# Patient Record
Sex: FEMALE | Race: WHITE | NOT HISPANIC OR LATINO | URBAN - METROPOLITAN AREA
[De-identification: names, ages, dates, MRNs, and addresses within clinical notes are randomized per-mention and may not be internally consistent; named-entity substitution may affect disease eponyms.]

---

## 2024-07-05 ENCOUNTER — INPATIENT (INPATIENT)
Facility: HOSPITAL | Age: 89
LOS: 5 days | Discharge: NURSING, REHAB OR LONG-TERM CARE | DRG: 871 | End: 2024-07-11
Attending: STUDENT IN AN ORGANIZED HEALTH CARE EDUCATION/TRAINING PROGRAM | Admitting: HOSPITALIST
Payer: MEDICARE

## 2024-07-05 VITALS
SYSTOLIC BLOOD PRESSURE: 149 MMHG | RESPIRATION RATE: 19 BRPM | HEART RATE: 110 BPM | OXYGEN SATURATION: 99 % | HEIGHT: 62 IN | TEMPERATURE: 98 F | DIASTOLIC BLOOD PRESSURE: 78 MMHG | WEIGHT: 130.07 LBS

## 2024-07-05 DIAGNOSIS — A41.9 SEPSIS, UNSPECIFIED ORGANISM: ICD-10-CM

## 2024-07-05 LAB
ALBUMIN SERPL ELPH-MCNC: 4 G/DL — SIGNIFICANT CHANGE UP (ref 3.5–5.2)
ALP SERPL-CCNC: 90 U/L — SIGNIFICANT CHANGE UP (ref 30–115)
ALT FLD-CCNC: 22 U/L — SIGNIFICANT CHANGE UP (ref 0–41)
ANION GAP SERPL CALC-SCNC: 23 MMOL/L — HIGH (ref 7–14)
APPEARANCE UR: ABNORMAL
AST SERPL-CCNC: 85 U/L — HIGH (ref 0–41)
BASOPHILS # BLD AUTO: 0.03 K/UL — SIGNIFICANT CHANGE UP (ref 0–0.2)
BASOPHILS NFR BLD AUTO: 0.2 % — SIGNIFICANT CHANGE UP (ref 0–1)
BILIRUB SERPL-MCNC: 0.6 MG/DL — SIGNIFICANT CHANGE UP (ref 0.2–1.2)
BILIRUB UR-MCNC: NEGATIVE — SIGNIFICANT CHANGE UP
BUN SERPL-MCNC: 56 MG/DL — HIGH (ref 10–20)
CALCIUM SERPL-MCNC: 9 MG/DL — SIGNIFICANT CHANGE UP (ref 8.4–10.5)
CHLORIDE SERPL-SCNC: 97 MMOL/L — LOW (ref 98–110)
CK SERPL-CCNC: 1445 U/L — HIGH (ref 0–225)
CK SERPL-CCNC: 1447 U/L — HIGH (ref 0–225)
CO2 SERPL-SCNC: 17 MMOL/L — SIGNIFICANT CHANGE UP (ref 17–32)
COLOR SPEC: YELLOW — SIGNIFICANT CHANGE UP
CREAT SERPL-MCNC: 2.5 MG/DL — HIGH (ref 0.7–1.5)
DIFF PNL FLD: ABNORMAL
EGFR: 18 ML/MIN/1.73M2 — LOW
EOSINOPHIL # BLD AUTO: 0 K/UL — SIGNIFICANT CHANGE UP (ref 0–0.7)
EOSINOPHIL NFR BLD AUTO: 0 % — SIGNIFICANT CHANGE UP (ref 0–8)
GLUCOSE SERPL-MCNC: 73 MG/DL — SIGNIFICANT CHANGE UP (ref 70–99)
GLUCOSE UR QL: NEGATIVE MG/DL — SIGNIFICANT CHANGE UP
HCT VFR BLD CALC: 43.1 % — SIGNIFICANT CHANGE UP (ref 37–47)
HGB BLD-MCNC: 13.6 G/DL — SIGNIFICANT CHANGE UP (ref 12–16)
IMM GRANULOCYTES NFR BLD AUTO: 0.6 % — HIGH (ref 0.1–0.3)
KETONES UR-MCNC: ABNORMAL MG/DL
LACTATE SERPL-SCNC: 1.4 MMOL/L — SIGNIFICANT CHANGE UP (ref 0.7–2)
LEUKOCYTE ESTERASE UR-ACNC: ABNORMAL
LYMPHOCYTES # BLD AUTO: 1.12 K/UL — LOW (ref 1.2–3.4)
LYMPHOCYTES # BLD AUTO: 8.2 % — LOW (ref 20.5–51.1)
MAGNESIUM SERPL-MCNC: 2.5 MG/DL — HIGH (ref 1.8–2.4)
MCHC RBC-ENTMCNC: 29.5 PG — SIGNIFICANT CHANGE UP (ref 27–31)
MCHC RBC-ENTMCNC: 31.6 G/DL — LOW (ref 32–37)
MCV RBC AUTO: 93.5 FL — SIGNIFICANT CHANGE UP (ref 81–99)
MONOCYTES # BLD AUTO: 0.77 K/UL — HIGH (ref 0.1–0.6)
MONOCYTES NFR BLD AUTO: 5.7 % — SIGNIFICANT CHANGE UP (ref 1.7–9.3)
NEUTROPHILS # BLD AUTO: 11.62 K/UL — HIGH (ref 1.4–6.5)
NEUTROPHILS NFR BLD AUTO: 85.3 % — HIGH (ref 42.2–75.2)
NITRITE UR-MCNC: NEGATIVE — SIGNIFICANT CHANGE UP
NRBC # BLD: 0 /100 WBCS — SIGNIFICANT CHANGE UP (ref 0–0)
PH UR: 6 — SIGNIFICANT CHANGE UP (ref 5–8)
PLATELET # BLD AUTO: 324 K/UL — SIGNIFICANT CHANGE UP (ref 130–400)
PMV BLD: 10.5 FL — HIGH (ref 7.4–10.4)
POTASSIUM SERPL-MCNC: 5.3 MMOL/L — HIGH (ref 3.5–5)
POTASSIUM SERPL-MCNC: SIGNIFICANT CHANGE UP MMOL/L (ref 3.5–5)
POTASSIUM SERPL-SCNC: 5.3 MMOL/L — HIGH (ref 3.5–5)
POTASSIUM SERPL-SCNC: SIGNIFICANT CHANGE UP MMOL/L (ref 3.5–5)
PROT SERPL-MCNC: 7.9 G/DL — SIGNIFICANT CHANGE UP (ref 6–8)
PROT UR-MCNC: 100 MG/DL
RBC # BLD: 4.61 M/UL — SIGNIFICANT CHANGE UP (ref 4.2–5.4)
RBC # FLD: 14.3 % — SIGNIFICANT CHANGE UP (ref 11.5–14.5)
SODIUM SERPL-SCNC: 137 MMOL/L — SIGNIFICANT CHANGE UP (ref 135–146)
SP GR SPEC: 1.02 — SIGNIFICANT CHANGE UP (ref 1–1.03)
UROBILINOGEN FLD QL: 0.2 MG/DL — SIGNIFICANT CHANGE UP (ref 0.2–1)
WBC # BLD: 13.62 K/UL — HIGH (ref 4.8–10.8)
WBC # FLD AUTO: 13.62 K/UL — HIGH (ref 4.8–10.8)

## 2024-07-05 PROCEDURE — 93010 ELECTROCARDIOGRAM REPORT: CPT

## 2024-07-05 PROCEDURE — 81001 URINALYSIS AUTO W/SCOPE: CPT

## 2024-07-05 PROCEDURE — 76770 US EXAM ABDO BACK WALL COMP: CPT

## 2024-07-05 PROCEDURE — 82306 VITAMIN D 25 HYDROXY: CPT

## 2024-07-05 PROCEDURE — 97163 PT EVAL HIGH COMPLEX 45 MIN: CPT | Mod: GP

## 2024-07-05 PROCEDURE — 97530 THERAPEUTIC ACTIVITIES: CPT | Mod: GP

## 2024-07-05 PROCEDURE — 36415 COLL VENOUS BLD VENIPUNCTURE: CPT

## 2024-07-05 PROCEDURE — 70450 CT HEAD/BRAIN W/O DYE: CPT | Mod: 26,MC

## 2024-07-05 PROCEDURE — 83735 ASSAY OF MAGNESIUM: CPT

## 2024-07-05 PROCEDURE — 71045 X-RAY EXAM CHEST 1 VIEW: CPT | Mod: 26

## 2024-07-05 PROCEDURE — 99285 EMERGENCY DEPT VISIT HI MDM: CPT

## 2024-07-05 PROCEDURE — 80053 COMPREHEN METABOLIC PANEL: CPT

## 2024-07-05 PROCEDURE — 97110 THERAPEUTIC EXERCISES: CPT | Mod: GP

## 2024-07-05 PROCEDURE — 99223 1ST HOSP IP/OBS HIGH 75: CPT

## 2024-07-05 PROCEDURE — 97116 GAIT TRAINING THERAPY: CPT | Mod: GP

## 2024-07-05 PROCEDURE — 87040 BLOOD CULTURE FOR BACTERIA: CPT

## 2024-07-05 PROCEDURE — 87086 URINE CULTURE/COLONY COUNT: CPT

## 2024-07-05 PROCEDURE — 85025 COMPLETE CBC W/AUTO DIFF WBC: CPT

## 2024-07-05 PROCEDURE — 72125 CT NECK SPINE W/O DYE: CPT | Mod: 26,MC

## 2024-07-05 PROCEDURE — 80061 LIPID PANEL: CPT

## 2024-07-05 PROCEDURE — 70551 MRI BRAIN STEM W/O DYE: CPT | Mod: MC

## 2024-07-05 PROCEDURE — 73502 X-RAY EXAM HIP UNI 2-3 VIEWS: CPT | Mod: 26,RT

## 2024-07-05 PROCEDURE — 82550 ASSAY OF CK (CPK): CPT

## 2024-07-05 PROCEDURE — 85379 FIBRIN DEGRADATION QUANT: CPT

## 2024-07-05 PROCEDURE — 83036 HEMOGLOBIN GLYCOSYLATED A1C: CPT

## 2024-07-05 RX ORDER — AZITHROMYCIN 250 MG/1
500 TABLET, FILM COATED ORAL ONCE
Refills: 0 | Status: COMPLETED | OUTPATIENT
Start: 2024-07-05 | End: 2024-07-05

## 2024-07-05 RX ORDER — DEXTROSE MONOHYDRATE AND SODIUM CHLORIDE 5; .3 G/100ML; G/100ML
1000 INJECTION, SOLUTION INTRAVENOUS ONCE
Refills: 0 | Status: COMPLETED | OUTPATIENT
Start: 2024-07-05 | End: 2024-07-05

## 2024-07-05 RX ORDER — ENOXAPARIN SODIUM 100 MG/ML
30 INJECTION SUBCUTANEOUS EVERY 24 HOURS
Refills: 0 | Status: DISCONTINUED | OUTPATIENT
Start: 2024-07-05 | End: 2024-07-06

## 2024-07-05 RX ORDER — CALCIUM CARBONATE/VITAMIN D2 250 MG-125
1 TABLET ORAL DAILY
Refills: 0 | Status: DISCONTINUED | OUTPATIENT
Start: 2024-07-05 | End: 2024-07-11

## 2024-07-05 RX ORDER — MORPHINE SULFATE 100 MG/1
4 TABLET, EXTENDED RELEASE ORAL ONCE
Refills: 0 | Status: DISCONTINUED | OUTPATIENT
Start: 2024-07-05 | End: 2024-07-05

## 2024-07-05 RX ORDER — SODIUM CHLORIDE 0.9 % (FLUSH) 0.9 %
1000 SYRINGE (ML) INJECTION
Refills: 0 | Status: DISCONTINUED | OUTPATIENT
Start: 2024-07-05 | End: 2024-07-11

## 2024-07-05 RX ORDER — CEFTRIAXONE SODIUM 500 MG
1000 VIAL (EA) INJECTION ONCE
Refills: 0 | Status: COMPLETED | OUTPATIENT
Start: 2024-07-05 | End: 2024-07-05

## 2024-07-05 RX ORDER — SODIUM ZIRCONIUM CYCLOSILICATE 10 G/10G
5 POWDER, FOR SUSPENSION ORAL ONCE
Refills: 0 | Status: COMPLETED | OUTPATIENT
Start: 2024-07-05 | End: 2024-07-05

## 2024-07-05 RX ORDER — DEXTROSE MONOHYDRATE AND SODIUM CHLORIDE 5; .3 G/100ML; G/100ML
1000 INJECTION, SOLUTION INTRAVENOUS
Refills: 0 | Status: DISCONTINUED | OUTPATIENT
Start: 2024-07-05 | End: 2024-07-05

## 2024-07-05 RX ADMIN — DEXTROSE MONOHYDRATE AND SODIUM CHLORIDE 1000 MILLILITER(S): 5; .3 INJECTION, SOLUTION INTRAVENOUS at 18:10

## 2024-07-05 RX ADMIN — AZITHROMYCIN 255 MILLIGRAM(S): 250 TABLET, FILM COATED ORAL at 17:39

## 2024-07-05 RX ADMIN — DEXTROSE MONOHYDRATE AND SODIUM CHLORIDE 1000 MILLILITER(S): 5; .3 INJECTION, SOLUTION INTRAVENOUS at 14:54

## 2024-07-05 RX ADMIN — ENOXAPARIN SODIUM 30 MILLIGRAM(S): 100 INJECTION SUBCUTANEOUS at 20:17

## 2024-07-05 RX ADMIN — SODIUM ZIRCONIUM CYCLOSILICATE 5 GRAM(S): 10 POWDER, FOR SUSPENSION ORAL at 20:00

## 2024-07-05 RX ADMIN — DEXTROSE MONOHYDRATE AND SODIUM CHLORIDE 1000 MILLILITER(S): 5; .3 INJECTION, SOLUTION INTRAVENOUS at 16:24

## 2024-07-05 RX ADMIN — MORPHINE SULFATE 4 MILLIGRAM(S): 100 TABLET, EXTENDED RELEASE ORAL at 15:55

## 2024-07-05 RX ADMIN — Medication 100 MILLIGRAM(S): at 16:54

## 2024-07-05 RX ADMIN — DEXTROSE MONOHYDRATE AND SODIUM CHLORIDE 60 MILLILITER(S): 5; .3 INJECTION, SOLUTION INTRAVENOUS at 20:17

## 2024-07-05 RX ADMIN — MORPHINE SULFATE 4 MILLIGRAM(S): 100 TABLET, EXTENDED RELEASE ORAL at 14:54

## 2024-07-05 RX ADMIN — Medication 75 MILLILITER(S): at 22:30

## 2024-07-05 RX ADMIN — DEXTROSE MONOHYDRATE AND SODIUM CHLORIDE 1000 MILLILITER(S): 5; .3 INJECTION, SOLUTION INTRAVENOUS at 16:00

## 2024-07-05 NOTE — H&P ADULT - ATTENDING COMMENTS
Patient seen and examined at bedside independently of the residents. I read the resident's note and agree with the plan with the additions and corrections as noted below. My note supersedes the resident's note.     REVIEW OF SYSTEMS:  Negative except in HPI.     PMH: None.     FHx: Reviewed. No fhx of asthma/copd, No fhx of liver and pulmonary disease. No fhx of hematological disorder.     Physical Exam:  GEN: No acute distress. Awake, Alert and oriented x 3.   Head: Atraumatic, Normocephalic.   Eye: PEERLA. No sclera icterus. EOMI.   ENT: Normal oropharynx, no thyromegaly, no mass, no lymphadenopathy.   LUNGS: Clear to auscultation bilaterally. No wheeze/rales/crackles.   HEART: Normal. S1/S2 present. RRR. No murmur/gallops.   ABD: Soft, non-tender, non-distended. Bowel sounds present.   EXT: No pitting edema. No erythema. No tenderness.  Integumentary: No rash, No sore, No petechia.   NEURO: CN III-XII intact. Strength: 5/5 b/l ULE. Sensory intact b/l ULE.     Vital Signs Last 24 Hrs  T(C): 36.5 (2024 22:00), Max: 36.8 (2024 13:31)  T(F): 97.7 (2024 22:00), Max: 98.3 (2024 13:31)  HR: 97 (2024 22:00) (97 - 110)  BP: 154/75 (2024 22:00) (141/71 - 154/75)  BP(mean): --  RR: 18 (2024 22:00) (18 - 19)  SpO2: 98% (2024 22:00) (98% - 99%)    Parameters below as of 2024 22:00  Patient On (Oxygen Delivery Method): room air      Please see the above notes for Labs and radiology.     Assessment and Plan:     89 yo F with no significant PMH presents to ED for generalized weakness, found down on the floor by son.     Generalized weakness (r/o infectious etiology)  - Ddx: UTI vs. CAP   - Sepsis presents on admission (WBC, HR)   - UA positive.   - CXR shows Low lung volumes with small left basilar opacity/atelectasis.  - s/p Azithromycin and Ceftriaxone given in ED.   - c/w Azithro and Ceftriaxone for now.   - f/u BCx, UCx, atypical PNA panel  - supportive care.   - fall precaution.     Rhabdomyolysis likely 2/2 fall   - CTH: No evidence of acute intracranial hemorrhage. Several age-indeterminate lacunar infarcts. Severe chronic microvascular changes.  - s/p 2L LR bolus given in ED.   - c/w IVF NS @ 75cc/hr   - repeat total CK    ZEKE and Hyperkalemia  - serum Cr 2.5 currently. No baseline Cr on chart.   - c/w NS @ 75cc/hr for now.  - s/p Lokelma 5g x 1 given in ED.   - renal US and urine studies  - avoid nephrotoxic drugs.     Cervical spine stenosis  - found on CT c-spine.  - No red flags.   - f/u outpatient with neurosurgery.    DVT ppx: Heparin SC  GI ppx: PPI  Diet: regular diet  Activity: as tolerated.     Date seen by the attendin2024  Total time spent: 75 minutes.

## 2024-07-05 NOTE — ED ADULT TRIAGE NOTE - CHIEF COMPLAINT QUOTE
Pt had unwitnessed fall yesterday at in the afternoon. PT son left around 3-4 yesterday. Pt was found on the ground by son this AM. PT reports neck and right hip pain - c collar placed due to neck pain by EMS. per PT No LOC No AC no HT. PT is A&Xo 3. Per pt she said she felt weak which made her fall.

## 2024-07-05 NOTE — ED PROVIDER NOTE - OBJECTIVE STATEMENT
88-year-old female no PMH presents to the ED for evaluation of unwitnessed fall.  Per EMS patient was found down by her son.  Reports son visited her yesterday and left sometime in the afternoon.  Patient states she felt weak and lowered herself to the ground. Denies head trauma, LOC or AC.  Patient states she was unable to get up and crawled on the ground around the house.  Estimates she was on the floor since 7 PM yesterday.  Patient complains of upper back and right hip pain.  Denies headache, dizziness, syncope/near syncope, chest pain, shortness of breath, abdominal pain, nausea, vomiting, diarrhea, urinary symptoms.

## 2024-07-05 NOTE — ED PROVIDER NOTE - CLINICAL SUMMARY MEDICAL DECISION MAKING FREE TEXT BOX
88-year-old female no pertinent past medical history presents to the ED after unwitnessed fall.  Patient lives alone and for the past couple days have felt weak, worse last night, felt so weak that she lowered herself to the ground, did not hit her head.  Was unable to get up off the floor all night.  Crawled over to her tablet and was able to FaceTime her family member who called 911 this morning and brought patient to the ER.  Patient complaining of diffuse body aches, urinated on herself because she was unable to get up to go to the bathroom.  Otherwise without complaints.  Told triage that she has neck pain and right hip pain, but on our history clarifies that she has pain everywhere.    On exam she is tachycardic with otherwise normal vitals.  She has dry mucous membranes she has diffuse reproducible muscular tenderness palpation.  No bony tenderness palpation.  Full range of motion of all extremities, normal strength and sensation, normal pulses.  Heart tachycardic without murmurs rubs or gallops, lungs clear to auscultation bilaterally.  Smells of urine.    Labs were obtained which showed leukocytosis, likely reactive from being on the ground all day.  She has elevated BUN to creatinine ratio greater than 20, no prior for comparison.  She has CK greater than 3 times upper limit of normal.    I independently interpreted the patient's x-ray as no fracture or dislocation.  Her CT head was unremarkable.  Patient's workup most consistent with rhabdomyolysis.  Family member at bedside now stating that they also would like assistance with a home health aide. Pending UA, concerned for UTI.
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
## 2024-07-05 NOTE — H&P ADULT - NSHPPHYSICALEXAM_GEN_ALL_CORE
GENERAL: NAD, lying in bed comfortably  HEAD:  Atraumatic, normocephalic  EYES: EOMI, PERRL  NECK: Supple, trachea midline, no JVD  HEART: Regular rate and rhythm  LUNGS: Unlabored respirations.  Clear to auscultation bilaterally, no crackles, wheezing, or rhonchi  ABDOMEN: Soft, nontender, nondistended, +BS  EXTREMITIES: 2+ peripheral pulses bilaterally. No clubbing, cyanosis, or edema  NERVOUS SYSTEM:  A&Ox3, moving all extremities, no focal deficits GENERAL: NAD, lying in bed comfortably  HEAD:  Atraumatic, normocephalic  EYES: EOMI, PERRL  NECK: Supple, trachea midline, no JVD  HEART: Regular rate and rhythm  LUNGS: Unlabored respirations.  decrease breath sound bilaterally, no crackles, wheezing, or rhonchi  ABDOMEN: Soft, nontender, nondistended, +BS  EXTREMITIES: 2+ peripheral pulses bilaterally. No clubbing, cyanosis, or edema  NERVOUS SYSTEM:  A&Ox3, moving all extremities, no focal deficits

## 2024-07-05 NOTE — H&P ADULT - HISTORY OF PRESENT ILLNESS
88-year-old female no PMH presents to the ED for evaluation of unwitnessed fall.  Per EMS patient was found down by her son.  Reports son visited her yesterday and left sometime in the afternoon.  Patient states she felt weak and lowered herself to the ground. Denies head trauma, LOC or AC.  Patient states she was unable to get up and crawled on the ground around the house.  Estimates she was on the floor since 7 PM yesterday.  Patient complains of upper back and right hip pain.  Denies headache, dizziness, syncope/near syncope, chest pain, shortness of breath, abdominal pain, nausea, vomiting, diarrhea, urinary symptoms.    In the ED: vitals were: BP:149/78  HR:110   TEMp: afeb  RR: 19   saturation: 2L NC    labs: wbc 13.62K, K 5.3, BUN/Cr: 56/2.5, lactate 1.4, AST 85, CK 1445 , UA: LE +ve, wbc 114, epithelial cells 8    Imaging: CT head : 1.  No evidence of acute intracranial hemorrhage,  Several age-indeterminate lacunar infarcts. Severe chronic   microvascular changes                  CT: 1.  No evidence of acute cervical spine fracture or subluxation.  Diffuse osteopenia. Multilevel degenerative changes, severe foraminal stenosis from C3- C7    t/t: s/p 2L bolus, azithromycin and ceftriaxone, morphine 4mg stat    admitted for weakness, ary and elevated CK 88-year-old female no PMH presents to the ED for evaluation of weakness.  Per EMS patient was found down by her son.  Reports son visited her yesterday and left sometime in the afternoon.  Patient states she felt weak and lowered herself to the ground. Denies head trauma, LOC or AC.  Patient states she was unable to get up and crawled on the ground around the house.  Estimates she was on the floor since 7 PM yesterday.  Patient complains of upper back and right hip pain.  Denies headache, dizziness, syncope/near syncope, chest pain, shortness of breath, abdominal pain, nausea, vomiting, diarrhea, urinary symptoms.    In the ED: vitals were: BP:149/78  HR:110   TEMp: afeb  RR: 19   saturation: 2L NC    labs: wbc 13.62K, K 5.3, BUN/Cr: 56/2.5, lactate 1.4, AST 85, CK 1445 , UA: LE +ve, wbc 114, epithelial cells 8    Imaging: CT head : 1.  No evidence of acute intracranial hemorrhage,  Several age-indeterminate lacunar infarcts. Severe chronic   microvascular changes                  CT: 1.  No evidence of acute cervical spine fracture or subluxation.  Diffuse osteopenia. Multilevel degenerative changes, severe foraminal stenosis from C3- C7    t/t: s/p 2L bolus, azithromycin and ceftriaxone, morphine 4mg stat    admitted for weakness, ary and elevated CK

## 2024-07-05 NOTE — ED PROVIDER NOTE - PROGRESS NOTE DETAILS
BF: Sepsis activated now given tachycardia, leukocytosis, and opacity on CXR. Sent blood cultures, given ceftriaxone/azithro, lactate. Will admit

## 2024-07-05 NOTE — H&P ADULT - NSHPLABSRESULTS_GEN_ALL_CORE
LABS:                          13.6   13.62 )-----------( 324      ( 2024 14:13 )             43.1     07-05    x   |  x   |  x   ----------------------------<  x   5.3<H>   |  x   |  x     Ca    9.0      2024 14:13  Mg     2.5     07-05    TPro  7.9  /  Alb  4.0  /  TBili  0.6  /  DBili  x   /  AST  85<H>  /  ALT  22  /  AlkPhos  90  07-05    LIVER FUNCTIONS - ( 2024 14:13 )  Alb: 4.0 g/dL / Pro: 7.9 g/dL / ALK PHOS: 90 U/L / ALT: 22 U/L / AST: 85 U/L / GGT: x             Urinalysis Basic - ( 2024 17:00 )    Color: Yellow / Appearance: Cloudy / S.016 / pH: x  Gluc: x / Ketone: Trace mg/dL  / Bili: Negative / Urobili: 0.2 mg/dL   Blood: x / Protein: 100 mg/dL / Nitrite: Negative   Leuk Esterase: Moderate / RBC: 1 /HPF /  /HPF   Sq Epi: x / Non Sq Epi: 8 /HPF / Bacteria: Few /HPF    CT head : 1.  No evidence of acute intracranial hemorrhage.    2.  Several age-indeterminate lacunar infarcts. Severe chronic   microvascular changes.      CT cervical spine:      At C3-4 there is disc osteophyte indenting the ventral thecal sac and   uncinate and facet hypertrophy with moderate right and mild left   foraminal stenosis.    At C4-5 there is disc osteophyte indenting the ventral thecal sac and   uncinate and facet hypertrophy with severe right and moderate left   foraminal stenosis.    At C5-6 there is disc osteophyte indenting the ventral thecal sac and   uncinate and facet hypertrophy with moderate to severe foraminal stenosis.    At C6-7 there is disc osteophyte indenting the ventral thecal sac and   uncinate and facet hypertrophy with moderate to severe foraminal stenosis.    There are medial retropharyngeal course of the bilateral internal carotid   arteries.    IMPRESSION:    1.  No evidence of acute cervical spine fracture or subluxation.    2.  Diffuse osteopenia. Multilevel degenerative dietrich

## 2024-07-05 NOTE — H&P ADULT - ASSESSMENT
88-year-old female no PMH presents to the ED for evaluation of unwitnessed fall.  Per EMS patient was found down by her son.  Reports son visited her yeterday and left sometime in the afternoon.  Patient states she felt weak and lowered herself to the ground. Denies head trauma, LOC or AC.  Patient states she was unable to get up and crawled on the ground around the house.  Estimates she was on the floor since 7 PM yesterday.  Patient complains of upper back and right hip pain.  Denies headache, dizziness, syncope/near syncope, chest pain, shortness of breath, abdominal pain, nausea, vomiting, diarrhea, urinary symptoms.        Assessment and plan:    #weakness  #ZEKE   #Hyperkalemia   #mild AST elevation  # elevated CK 1400 (likely due to immobilization for 18hr) ,     - symptoms of UTI:   - vitals were: BP:149/78  HR:110   TEMp: afeb  RR: 19   saturation: 2L NC  -  wbc 13.62K, K 5.3, BUN/Cr: 56/2.5, lactate 1.4, AST 85, CK 1445 , UA: LE +ve, wbc 114, epithelial cells 8  - CT head and spine: negative for any fracture, severe foraminal stenosis from c3-c7  -  s/p 2L bolus, azithromycin and ceftriaxone, morphine 4mg stat  - c/w LR : 75cc/hr   - PT eval   - moniter Cr , follow up with CK level         DVT: enoxaparin  diet: regular  activity as tolerated : PT eval needed  dispo: medicine floor   88-year-old female no PMH presents to the ED for evaluation of unwitnessed fall.  Per EMS patient was found down by her son.  Reports son visited her yeterday and left sometime in the afternoon.  Patient states she felt weak and lowered herself to the ground. Denies head trauma, LOC or AC.  Patient states she was unable to get up and crawled on the ground around the house.  Estimates she was on the floor since 7 PM yesterday.  Patient complains of upper back and right hip pain.  Denies headache, dizziness, syncope/near syncope, chest pain, shortness of breath, abdominal pain, nausea, vomiting, diarrhea, urinary symptoms.        Assessment and plan:    #weakness  #ZEKE  (no baseline )   #Hyperkalemia s/p  lokelma 5mg  #mild AST elevation  # elevated CK 1400 (likely due to immobilization for 18hr) ,     - no urinary symptoms, moniter OFF antibiotics  - vitals were: BP:149/78  HR:110   Temp: afeb  RR: 19   saturation:RA  -  wbc 13.62K, K 5.3, BUN/Cr: 56/2.5, lactate 1.4, AST 85, CK 1445 , UA: LE +ve, wbc 114, epithelial cells 8  - CT head and spine: negative for any fracture, severe foraminal stenosis from c3-c7  -  s/p 2L bolus, azithromycin and ceftriaxone, morphine 4mg stat  - c/w LR : 60cc/hr   - PT eval   - moniter Cr, trend LFT , follow up with CK level         DVT: enoxaparin  diet: regular  activity as tolerated : PT eval needed  dispo: medicine floor  pt doesnot have any PCP, endorses she never got sick   88-year-old female no PMH presents to the ED for evaluation of unwitnessed fall.  Per EMS patient was found down by her son.  Reports son visited her yeterday and left sometime in the afternoon.  Patient states she felt weak and lowered herself to the ground. Denies head trauma, LOC or AC.  Patient states she was unable to get up and crawled on the ground around the house.  Estimates she was on the floor since 7 PM yesterday.  Patient complains of upper back and right hip pain.  Denies headache, dizziness, syncope/near syncope, chest pain, shortness of breath, abdominal pain, nausea, vomiting, diarrhea, urinary symptoms.        Assessment and plan:    #weakness  #ZEKE  (no baseline )   #Hyperkalemia s/p  lokelma 5mg  #mild AST elevation  # elevated CK 1400 (likely due to immobilization for 18hr)    - trauma work up negative  - no urinary symptoms, moniter OFF antibiotics  - vitals were: BP:149/78  HR:110   Temp: afeb  RR: 19   saturation:RA  -  wbc 13.62K, K 5.3, BUN/Cr: 56/2.5, lactate 1.4, AST 85, CK 1445 , UA: LE +ve, wbc 114, epithelial cells 8  - CT head and spine: negative for any fracture, severe foraminal stenosis from c3-c7  -  s/p 2L bolus, azithromycin and ceftriaxone, morphine 4mg stat  - c/w LR : 60cc/hr   - moniter Cr, trend LFT , follow up with CK level    - PT eval     #Diffuse Osteopenia  - outpt Dexa   - starting on calcium and vit D supplement   - check Vit D         DVT: enoxaparin  diet: regular  activity as tolerated : PT eval needed  dispo: medicine floor  pt doesnot have any PCP, endorses she never got sick

## 2024-07-05 NOTE — ED PROVIDER NOTE - CARE PLAN
1 Principal Discharge DX:	Sepsis  Secondary Diagnosis:	Rhabdomyolysis  Secondary Diagnosis:	ZEKE (acute kidney injury)

## 2024-07-05 NOTE — ED PROVIDER NOTE - PHYSICAL EXAMINATION
VITAL SIGNS: I have reviewed nursing notes and confirm.  CONSTITUTIONAL: non-toxic, NAD  SKIN: Warm dry  HEAD: NCAT  EYES: EOMI, PERRLA  ENT: Dry mucous membranes  NECK: Supple. No midline/paraspinal TTP.  CARD: RRR, no murmurs, rubs or gallops  RESP: clear to ausculation b/l.  No rales, rhonchi, or wheezing.  ABD: soft, non-tender, non-distended, no rebound or guarding.   BACK: B/L thoracic paraspinal TTP. No midline TTP.  EXT: Full ROM. Diffuse musculoskeletal TTP.  NEURO: Grossly intact.  PSYCH: Cooperative, appropriate.

## 2024-07-05 NOTE — ED ADULT NURSE NOTE - NSFALLHARMRISKINTERV_ED_ALL_ED

## 2024-07-06 LAB
24R-OH-CALCIDIOL SERPL-MCNC: <6 NG/ML — LOW (ref 30–80)
A1C WITH ESTIMATED AVERAGE GLUCOSE RESULT: 5.3 % — SIGNIFICANT CHANGE UP (ref 4–5.6)
ALBUMIN SERPL ELPH-MCNC: 3.2 G/DL — LOW (ref 3.5–5.2)
ALP SERPL-CCNC: 73 U/L — SIGNIFICANT CHANGE UP (ref 30–115)
ALT FLD-CCNC: 15 U/L — SIGNIFICANT CHANGE UP (ref 0–41)
ANION GAP SERPL CALC-SCNC: 16 MMOL/L — HIGH (ref 7–14)
AST SERPL-CCNC: 40 U/L — SIGNIFICANT CHANGE UP (ref 0–41)
BASOPHILS # BLD AUTO: 0.06 K/UL — SIGNIFICANT CHANGE UP (ref 0–0.2)
BASOPHILS NFR BLD AUTO: 0.7 % — SIGNIFICANT CHANGE UP (ref 0–1)
BILIRUB SERPL-MCNC: 0.3 MG/DL — SIGNIFICANT CHANGE UP (ref 0.2–1.2)
BUN SERPL-MCNC: 50 MG/DL — HIGH (ref 10–20)
CALCIUM SERPL-MCNC: 8.4 MG/DL — SIGNIFICANT CHANGE UP (ref 8.4–10.4)
CHLORIDE SERPL-SCNC: 102 MMOL/L — SIGNIFICANT CHANGE UP (ref 98–110)
CK SERPL-CCNC: 652 U/L — HIGH (ref 0–225)
CO2 SERPL-SCNC: 20 MMOL/L — SIGNIFICANT CHANGE UP (ref 17–32)
CREAT SERPL-MCNC: 2.1 MG/DL — HIGH (ref 0.7–1.5)
D DIMER BLD IA.RAPID-MCNC: 348 NG/ML DDU — HIGH
EGFR: 22 ML/MIN/1.73M2 — LOW
EOSINOPHIL # BLD AUTO: 0.05 K/UL — SIGNIFICANT CHANGE UP (ref 0–0.7)
EOSINOPHIL NFR BLD AUTO: 0.6 % — SIGNIFICANT CHANGE UP (ref 0–8)
ESTIMATED AVERAGE GLUCOSE: 105 MG/DL — SIGNIFICANT CHANGE UP (ref 68–114)
GLUCOSE SERPL-MCNC: 74 MG/DL — SIGNIFICANT CHANGE UP (ref 70–99)
HCT VFR BLD CALC: 37.7 % — SIGNIFICANT CHANGE UP (ref 37–47)
HGB BLD-MCNC: 12 G/DL — SIGNIFICANT CHANGE UP (ref 12–16)
IMM GRANULOCYTES NFR BLD AUTO: 0.5 % — HIGH (ref 0.1–0.3)
LYMPHOCYTES # BLD AUTO: 1.52 K/UL — SIGNIFICANT CHANGE UP (ref 1.2–3.4)
LYMPHOCYTES # BLD AUTO: 18.9 % — LOW (ref 20.5–51.1)
MAGNESIUM SERPL-MCNC: 2.2 MG/DL — SIGNIFICANT CHANGE UP (ref 1.8–2.4)
MCHC RBC-ENTMCNC: 30.1 PG — SIGNIFICANT CHANGE UP (ref 27–31)
MCHC RBC-ENTMCNC: 31.8 G/DL — LOW (ref 32–37)
MCV RBC AUTO: 94.5 FL — SIGNIFICANT CHANGE UP (ref 81–99)
MONOCYTES # BLD AUTO: 0.61 K/UL — HIGH (ref 0.1–0.6)
MONOCYTES NFR BLD AUTO: 7.6 % — SIGNIFICANT CHANGE UP (ref 1.7–9.3)
NEUTROPHILS # BLD AUTO: 5.75 K/UL — SIGNIFICANT CHANGE UP (ref 1.4–6.5)
NEUTROPHILS NFR BLD AUTO: 71.7 % — SIGNIFICANT CHANGE UP (ref 42.2–75.2)
NRBC # BLD: 0 /100 WBCS — SIGNIFICANT CHANGE UP (ref 0–0)
PLATELET # BLD AUTO: 250 K/UL — SIGNIFICANT CHANGE UP (ref 130–400)
PMV BLD: 12 FL — HIGH (ref 7.4–10.4)
POTASSIUM SERPL-MCNC: 5 MMOL/L — SIGNIFICANT CHANGE UP (ref 3.5–5)
POTASSIUM SERPL-SCNC: 5 MMOL/L — SIGNIFICANT CHANGE UP (ref 3.5–5)
PROT SERPL-MCNC: 5.5 G/DL — LOW (ref 6–8)
RBC # BLD: 3.99 M/UL — LOW (ref 4.2–5.4)
RBC # FLD: 14.2 % — SIGNIFICANT CHANGE UP (ref 11.5–14.5)
SODIUM SERPL-SCNC: 138 MMOL/L — SIGNIFICANT CHANGE UP (ref 135–146)
WBC # BLD: 8.03 K/UL — SIGNIFICANT CHANGE UP (ref 4.8–10.8)
WBC # FLD AUTO: 8.03 K/UL — SIGNIFICANT CHANGE UP (ref 4.8–10.8)

## 2024-07-06 PROCEDURE — 99232 SBSQ HOSP IP/OBS MODERATE 35: CPT

## 2024-07-06 RX ORDER — AZITHROMYCIN 250 MG/1
500 TABLET, FILM COATED ORAL EVERY 24 HOURS
Refills: 0 | Status: DISCONTINUED | OUTPATIENT
Start: 2024-07-06 | End: 2024-07-09

## 2024-07-06 RX ORDER — HEPARIN SODIUM 50 [USP'U]/ML
5000 INJECTION, SOLUTION INTRAVENOUS EVERY 12 HOURS
Refills: 0 | Status: DISCONTINUED | OUTPATIENT
Start: 2024-07-06 | End: 2024-07-11

## 2024-07-06 RX ORDER — CEFTRIAXONE SODIUM 500 MG
1000 VIAL (EA) INJECTION EVERY 24 HOURS
Refills: 0 | Status: DISCONTINUED | OUTPATIENT
Start: 2024-07-06 | End: 2024-07-09

## 2024-07-06 RX ADMIN — HEPARIN SODIUM 5000 UNIT(S): 50 INJECTION, SOLUTION INTRAVENOUS at 05:04

## 2024-07-06 RX ADMIN — HEPARIN SODIUM 5000 UNIT(S): 50 INJECTION, SOLUTION INTRAVENOUS at 17:13

## 2024-07-06 RX ADMIN — Medication 100 MILLIGRAM(S): at 16:40

## 2024-07-06 RX ADMIN — Medication 1 TABLET(S): at 16:40

## 2024-07-06 RX ADMIN — AZITHROMYCIN 255 MILLIGRAM(S): 250 TABLET, FILM COATED ORAL at 16:39

## 2024-07-06 NOTE — PHYSICAL THERAPY INITIAL EVALUATION ADULT - PERTINENT HX OF CURRENT PROBLEM, REHAB EVAL
88-year-old female no PMH presents to the ED for evaluation of weakness.  Per EMS patient was found down by her son.  Reports son visited her yesterday and left sometime in the afternoon.  Patient states she felt weak and lowered herself to the ground. Denies head trauma, LOC or AC.  Patient states she was unable to get up and crawled on the ground around the house.  Estimates she was on the floor since 7 PM yesterday.  Patient complains of upper back and right hip pain.  Denies headache, dizziness, syncope/near syncope, chest pain, shortness of breath, abdominal pain, nausea, vomiting, diarrhea, urinary symptoms.

## 2024-07-06 NOTE — PROGRESS NOTE ADULT - ASSESSMENT
89 yo F with no significant PMH presents to ED for generalized weakness, found down on the floor by son.     Generalized weakness (r/o infectious etiology)  - Ddx: UTI vs. CAP   - Sepsis presents on admission (WBC, HR)   - UA positive.   - CXR shows Low lung volumes with small left basilar opacity/atelectasis.  - c/w Azithro and Ceftriaxone for now.   - f/u BCx, UCx, atypical PNA panel  - supportive care.   - fall precaution.   - PT eval: Sub acute rehab  - CT head: several age-indeterminate lacunar infarct.  consider neuro eval.    Rhabdomyolysis likely 2/2 fall   - CTH: No evidence of acute intracranial hemorrhage. Several age-indeterminate lacunar infarcts. Severe chronic microvascular changes.  - s/p 2L LR bolus given in ED.   - c/w IVF NS @ 75cc/hr for now. encourage po intake.  - repeat total CK improving.    ZEKE and Hyperkalemia  - serum Cr 2.5 currently. No baseline Cr on chart.   - c/w NS @ 75cc/hr for now. encourage po intake.  - s/p Lokelma 5g x 1 given in ED.   repeat K wnl  - renal US and urine studies ordered.  - avoid nephrotoxic drugs.     Cervical spine stenosis  - Diffuse osteopenia + multilevel degenerative changes on CT c-spine.  - No red flags.   - f/u outpatient with neurosurgery.    DVT ppx: Heparin SC  GI ppx: PPI  Diet: regular diet  Activity: as tolerated.    COMMUNICATION: Diagnosis and plan of care discussed in detail with patient. She actively participated in the conversation and fully understood plan.     Total time spent to complete patient's bedside assessment, review medical chart, discuss medical plan of care with covering medical team was more than 35 minutes  with >50% of time spent face to face with patient, discussion with patient/family and/or coordination of care.

## 2024-07-06 NOTE — PHYSICAL THERAPY INITIAL EVALUATION ADULT - GENERAL OBSERVATIONS, REHAB EVAL
Patient was seen from 9:42-10:10 for PT IE. Patient was rec'd in semi reclined in bed, +IVL, +primafit, NAD, agreeable to participate in PT.

## 2024-07-06 NOTE — PHYSICAL THERAPY INITIAL EVALUATION ADULT - GAIT DISTANCE, PT EVAL
2 steps forward and 2 steps backwards, 4 small steps sideways along the edge of bed. Patient was unable to continue further distance due to weakness

## 2024-07-07 LAB
ALBUMIN SERPL ELPH-MCNC: 3.3 G/DL — LOW (ref 3.5–5.2)
ALP SERPL-CCNC: 85 U/L — SIGNIFICANT CHANGE UP (ref 30–115)
ALT FLD-CCNC: 16 U/L — SIGNIFICANT CHANGE UP (ref 0–41)
ANION GAP SERPL CALC-SCNC: 12 MMOL/L — SIGNIFICANT CHANGE UP (ref 7–14)
AST SERPL-CCNC: 37 U/L — SIGNIFICANT CHANGE UP (ref 0–41)
BASOPHILS # BLD AUTO: 0.05 K/UL — SIGNIFICANT CHANGE UP (ref 0–0.2)
BASOPHILS NFR BLD AUTO: 0.7 % — SIGNIFICANT CHANGE UP (ref 0–1)
BILIRUB SERPL-MCNC: 0.4 MG/DL — SIGNIFICANT CHANGE UP (ref 0.2–1.2)
BUN SERPL-MCNC: 48 MG/DL — HIGH (ref 10–20)
CALCIUM SERPL-MCNC: 8.3 MG/DL — LOW (ref 8.4–10.5)
CHLORIDE SERPL-SCNC: 103 MMOL/L — SIGNIFICANT CHANGE UP (ref 98–110)
CK SERPL-CCNC: 458 U/L — HIGH (ref 0–225)
CO2 SERPL-SCNC: 23 MMOL/L — SIGNIFICANT CHANGE UP (ref 17–32)
CREAT SERPL-MCNC: 1.9 MG/DL — HIGH (ref 0.7–1.5)
EGFR: 25 ML/MIN/1.73M2 — LOW
EOSINOPHIL # BLD AUTO: 0.1 K/UL — SIGNIFICANT CHANGE UP (ref 0–0.7)
EOSINOPHIL NFR BLD AUTO: 1.4 % — SIGNIFICANT CHANGE UP (ref 0–8)
GLUCOSE SERPL-MCNC: 87 MG/DL — SIGNIFICANT CHANGE UP (ref 70–99)
HCT VFR BLD CALC: 35 % — LOW (ref 37–47)
HGB BLD-MCNC: 11.1 G/DL — LOW (ref 12–16)
IMM GRANULOCYTES NFR BLD AUTO: 0.4 % — HIGH (ref 0.1–0.3)
LYMPHOCYTES # BLD AUTO: 1.56 K/UL — SIGNIFICANT CHANGE UP (ref 1.2–3.4)
LYMPHOCYTES # BLD AUTO: 21.7 % — SIGNIFICANT CHANGE UP (ref 20.5–51.1)
MAGNESIUM SERPL-MCNC: 2.1 MG/DL — SIGNIFICANT CHANGE UP (ref 1.8–2.4)
MCHC RBC-ENTMCNC: 29.7 PG — SIGNIFICANT CHANGE UP (ref 27–31)
MCHC RBC-ENTMCNC: 31.7 G/DL — LOW (ref 32–37)
MCV RBC AUTO: 93.6 FL — SIGNIFICANT CHANGE UP (ref 81–99)
MONOCYTES # BLD AUTO: 0.7 K/UL — HIGH (ref 0.1–0.6)
MONOCYTES NFR BLD AUTO: 9.7 % — HIGH (ref 1.7–9.3)
NEUTROPHILS # BLD AUTO: 4.74 K/UL — SIGNIFICANT CHANGE UP (ref 1.4–6.5)
NEUTROPHILS NFR BLD AUTO: 66.1 % — SIGNIFICANT CHANGE UP (ref 42.2–75.2)
NRBC # BLD: 0 /100 WBCS — SIGNIFICANT CHANGE UP (ref 0–0)
PLATELET # BLD AUTO: 253 K/UL — SIGNIFICANT CHANGE UP (ref 130–400)
PMV BLD: 9.7 FL — SIGNIFICANT CHANGE UP (ref 7.4–10.4)
POTASSIUM SERPL-MCNC: 4.5 MMOL/L — SIGNIFICANT CHANGE UP (ref 3.5–5)
POTASSIUM SERPL-SCNC: 4.5 MMOL/L — SIGNIFICANT CHANGE UP (ref 3.5–5)
PROT SERPL-MCNC: 6.1 G/DL — SIGNIFICANT CHANGE UP (ref 6–8)
RBC # BLD: 3.74 M/UL — LOW (ref 4.2–5.4)
RBC # FLD: 13.9 % — SIGNIFICANT CHANGE UP (ref 11.5–14.5)
SODIUM SERPL-SCNC: 138 MMOL/L — SIGNIFICANT CHANGE UP (ref 135–146)
WBC # BLD: 7.18 K/UL — SIGNIFICANT CHANGE UP (ref 4.8–10.8)
WBC # FLD AUTO: 7.18 K/UL — SIGNIFICANT CHANGE UP (ref 4.8–10.8)

## 2024-07-07 PROCEDURE — 70551 MRI BRAIN STEM W/O DYE: CPT | Mod: 26

## 2024-07-07 PROCEDURE — 99232 SBSQ HOSP IP/OBS MODERATE 35: CPT

## 2024-07-07 PROCEDURE — 76770 US EXAM ABDO BACK WALL COMP: CPT | Mod: 26

## 2024-07-07 RX ORDER — ATORVASTATIN CALCIUM 20 MG/1
40 TABLET, FILM COATED ORAL AT BEDTIME
Refills: 0 | Status: DISCONTINUED | OUTPATIENT
Start: 2024-07-07 | End: 2024-07-11

## 2024-07-07 RX ORDER — ASPIRIN 325 MG/1
81 TABLET, FILM COATED ORAL DAILY
Refills: 0 | Status: DISCONTINUED | OUTPATIENT
Start: 2024-07-07 | End: 2024-07-11

## 2024-07-07 RX ADMIN — Medication 1 TABLET(S): at 12:27

## 2024-07-07 RX ADMIN — HEPARIN SODIUM 5000 UNIT(S): 50 INJECTION, SOLUTION INTRAVENOUS at 05:50

## 2024-07-07 RX ADMIN — Medication 100 MILLIGRAM(S): at 17:25

## 2024-07-07 RX ADMIN — ASPIRIN 81 MILLIGRAM(S): 325 TABLET, FILM COATED ORAL at 17:32

## 2024-07-07 RX ADMIN — HEPARIN SODIUM 5000 UNIT(S): 50 INJECTION, SOLUTION INTRAVENOUS at 17:25

## 2024-07-07 RX ADMIN — AZITHROMYCIN 255 MILLIGRAM(S): 250 TABLET, FILM COATED ORAL at 17:25

## 2024-07-07 RX ADMIN — ATORVASTATIN CALCIUM 40 MILLIGRAM(S): 20 TABLET, FILM COATED ORAL at 22:10

## 2024-07-07 NOTE — PROGRESS NOTE ADULT - ASSESSMENT
87 yo F with no significant PMH presents to ED for generalized weakness, found down on the floor by son.     Generalized weakness (r/o infectious etiology)  - Ddx: UTI vs. CAP   - Sepsis presents on admission (WBC, HR)   - UA positive.   - CXR shows Low lung volumes with small left basilar opacity/atelectasis.  - c/w Azithro and Ceftriaxone for now.   - BCx NGTD  - f/u UCx, atypical PNA panel  - supportive care.   - fall precaution.   - PT eval: Sub acute rehab  --> PT f/up  - CT head: several age-indeterminate lacunar infarct. neuro eval.    Rhabdomyolysis likely 2/2 fall   - CTH: No evidence of acute intracranial hemorrhage. Several age-indeterminate lacunar infarcts. Severe chronic microvascular changes.  - s/p 2L LR bolus given in ED.   - c/w IVF NS @ 75cc/hr for now. encourage po intake.  - repeat total CK improving.    ZEKE and Hyperkalemia  - serum Cr 2.5 on admission. repeat cr. 1.9 today   - c/w NS @ 75cc/hr for now. encourage po intake.  - s/p Lokelma 5g x 1 given in ED.   repeat K wnl  - renal US and urine studies ordered.  - avoid nephrotoxic drugs.     Cervical spine stenosis  - Diffuse osteopenia + multilevel degenerative changes on CT c-spine.  - No red flags.   - f/u outpatient with neurosurgery.    DVT ppx: Heparin SC  GI ppx: PPI  Diet: regular diet  Activity: as tolerated.  Dispo: Anticipate for tomorrow.    COMMUNICATION: Diagnosis and plan of care discussed in detail with patient. She actively participated in the conversation and fully understood plan.     Total time spent to complete patient's bedside assessment, review medical chart, discuss medical plan of care with covering medical team was more than 35 minutes  with >50% of time spent face to face with patient, discussion with patient/family and/or coordination of care.

## 2024-07-07 NOTE — CONSULT NOTE ADULT - NS ATTEND AMEND GEN_ALL_CORE FT
Pt presented with fall, generalized weakness. Stroke team consulted for incidental age indeterminate infarct on CT head.   MRI brain negative for acute ischemia stroke. Notable for severe chronic small vessel disease including multiple chronic microhemmorhages. Optimize cerebrovascular risk factors, especially BP control. F/u in neurology clinic as needed.

## 2024-07-07 NOTE — CONSULT NOTE ADULT - ASSESSMENT
Impression:  88-year-old female no PMH presents to the ED for evaluation of weakness.  Per EMS patient was found down by her son. Patient states she felt weak and lowered herself to the ground. Denies head trauma, LOC or AC.  Patient states she was unable to get up and crawled on the ground around the house.  Treated medically for UTI and Rhabdomyolysis. CTH revealed several age-indeterminate lacunar infarcts. Etiology of generalized weakness more likely related to medical illness and less likely neurovascular event.     Suggestion:  MRI brain without jamal  Medical management of infectious process  Trend CK  PT OT rehab when medically able.  Impression:  88-year-old female no PMH presents to the ED for evaluation of weakness.  Per EMS patient was found down by her son. Patient states she felt weak and lowered herself to the ground. Denies head trauma, LOC or AC.  Patient states she was unable to get up and crawled on the ground around the house.  Treated medically for UTI and Rhabdomyolysis. CTH revealed several age-indeterminate lacunar infarcts. Etiology of generalized weakness more likely related to medical illness and less likely neurovascular event.     Suggestion:  -MRI brain without jamal.  -Continue ASA and statin if medically able.  -Medical management of infectious process.  -Trend CK.  -PT OT rehab when medically able.

## 2024-07-07 NOTE — CONSULT NOTE ADULT - SUBJECTIVE AND OBJECTIVE BOX
Neurology Consult    Patient is a 88y old  Female who presents with a chief complaint of weakness (07 Jul 2024 13:18)      HPI:  88-year-old female no PMH presents to the ED for evaluation of weakness.  Per EMS patient was found down by her son.  Reports son visited her yesterday and left sometime in the afternoon.  Patient states she felt weak and lowered herself to the ground. Denies head trauma, LOC or AC.  Patient states she was unable to get up and crawled on the ground around the house.  Estimates she was on the floor since 7 PM yesterday.  Patient complains of upper back and right hip pain.  Denies headache, dizziness, syncope/near syncope, chest pain, shortness of breath, abdominal pain, nausea, vomiting, diarrhea, urinary symptoms.    In the ED: vitals were: BP:149/78  HR:110   TEMp: afeb  RR: 19   saturation: 2L NC    labs: wbc 13.62K, K 5.3, BUN/Cr: 56/2.5, lactate 1.4, AST 85, CK 1445 , UA: LE +ve, wbc 114, epithelial cells 8    Imaging: CT head : 1.  No evidence of acute intracranial hemorrhage,  Several age-indeterminate lacunar infarcts. Severe chronic   microvascular changes                  CT: 1.  No evidence of acute cervical spine fracture or subluxation.  Diffuse osteopenia. Multilevel degenerative changes, severe foraminal stenosis from C3- C7    t/t: s/p 2L bolus, azithromycin and ceftriaxone, morphine 4mg stat    ******************Neuro consult for age indeterminate infarcts on CTH.      PAST MEDICAL & SURGICAL HISTORY:      FAMILY HISTORY:      Social History: (-) x 3    Allergies    No Known Allergies    Intolerances        MEDICATIONS  (STANDING):  aspirin  chewable 81 milliGRAM(s) Oral daily  atorvastatin 40 milliGRAM(s) Oral at bedtime  azithromycin  IVPB 500 milliGRAM(s) IV Intermittent every 24 hours  calcium carbonate 1250 mG  + Vitamin D (OsCal 500 + D) 1 Tablet(s) Oral daily  cefTRIAXone   IVPB 1000 milliGRAM(s) IV Intermittent every 24 hours  heparin   Injectable 5000 Unit(s) SubCutaneous every 12 hours  sodium chloride 0.9%. 1000 milliLiter(s) (75 mL/Hr) IV Continuous <Continuous>    MEDICATIONS  (PRN):      Vital Signs Last 24 Hrs  T(C): 37.3 (07 Jul 2024 13:15), Max: 37.3 (07 Jul 2024 13:15)  T(F): 99.1 (07 Jul 2024 13:15), Max: 99.1 (07 Jul 2024 13:15)  HR: 99 (07 Jul 2024 13:15) (71 - 99)  BP: 135/83 (07 Jul 2024 13:15) (133/62 - 155/73)  BP(mean): --  RR: 18 (07 Jul 2024 13:15) (18 - 18)  SpO2: 100% (07 Jul 2024 04:26) (100% - 100%)        Examination:  General:  Appearance is consistent with chronologic age.  No abnormal facies.  Gross skin survey within normal limits.    Cognitive/Language:  The patient is oriented to person, place, month and age.  Fund of knowledge is intact and normal.  Language with normal repetition, comprehension and naming.  Nondysarthric.    Eyes: intact VA, VFF.  EOMI w/o nystagmus, skew or reported double vision.  PERRL.  No ptosis/weakness of eyelid closure.    Face:  Facial sensation normal V1 - 3, no facial asymmetry.    Formal Muscle Strength Testing: (MRC grade R/L) 5/5 UE; 5/5 LE. No drift  Sensory examination:   Intact to light touch in all extremities.  Cerebellum:   FTN with no drift    NIHSS 0    Labs:   CBC Full  -  ( 07 Jul 2024 08:17 )  WBC Count : 7.18 K/uL  RBC Count : 3.74 M/uL  Hemoglobin : 11.1 g/dL  Hematocrit : 35.0 %  Platelet Count - Automated : 253 K/uL  Mean Cell Volume : 93.6 fL  Mean Cell Hemoglobin : 29.7 pg  Mean Cell Hemoglobin Concentration : 31.7 g/dL  Auto Neutrophil # : 4.74 K/uL  Auto Lymphocyte # : 1.56 K/uL  Auto Monocyte # : 0.70 K/uL  Auto Eosinophil # : 0.10 K/uL  Auto Basophil # : 0.05 K/uL  Auto Neutrophil % : 66.1 %  Auto Lymphocyte % : 21.7 %  Auto Monocyte % : 9.7 %  Auto Eosinophil % : 1.4 %  Auto Basophil % : 0.7 %    07-07    138  |  103  |  48<H>  ----------------------------<  87  4.5   |  23  |  1.9<H>    Ca    8.3<L>      07 Jul 2024 08:17  Mg     2.1     07-07    TPro  6.1  /  Alb  3.3<L>  /  TBili  0.4  /  DBili  x   /  AST  37  /  ALT  16  /  AlkPhos  85  07-07    LIVER FUNCTIONS - ( 07 Jul 2024 08:17 )  Alb: 3.3 g/dL / Pro: 6.1 g/dL / ALK PHOS: 85 U/L / ALT: 16 U/L / AST: 37 U/L / GGT: x             Urinalysis Basic - ( 07 Jul 2024 08:17 )    Color: x / Appearance: x / SG: x / pH: x  Gluc: 87 mg/dL / Ketone: x  / Bili: x / Urobili: x   Blood: x / Protein: x / Nitrite: x   Leuk Esterase: x / RBC: x / WBC x   Sq Epi: x / Non Sq Epi: x / Bacteria: x          Neuroimaging:  < from: CT Head No Cont (07.05.24 @ 15:54) >    IMPRESSION:    1.  No evidence of acute intracranial hemorrhage.    2.  Several age-indeterminate lacunar infarcts. Severe chronic   microvascular changes.    < end of copied text >

## 2024-07-08 ENCOUNTER — TRANSCRIPTION ENCOUNTER (OUTPATIENT)
Age: 89
End: 2024-07-08

## 2024-07-08 LAB
ALBUMIN SERPL ELPH-MCNC: 3.5 G/DL — SIGNIFICANT CHANGE UP (ref 3.5–5.2)
ALP SERPL-CCNC: 95 U/L — SIGNIFICANT CHANGE UP (ref 30–115)
ALT FLD-CCNC: 18 U/L — SIGNIFICANT CHANGE UP (ref 0–41)
ANION GAP SERPL CALC-SCNC: 12 MMOL/L — SIGNIFICANT CHANGE UP (ref 7–14)
AST SERPL-CCNC: 35 U/L — SIGNIFICANT CHANGE UP (ref 0–41)
BASOPHILS # BLD AUTO: 0.07 K/UL — SIGNIFICANT CHANGE UP (ref 0–0.2)
BASOPHILS NFR BLD AUTO: 0.8 % — SIGNIFICANT CHANGE UP (ref 0–1)
BILIRUB SERPL-MCNC: 0.2 MG/DL — SIGNIFICANT CHANGE UP (ref 0.2–1.2)
BUN SERPL-MCNC: 44 MG/DL — HIGH (ref 10–20)
CALCIUM SERPL-MCNC: 8.6 MG/DL — SIGNIFICANT CHANGE UP (ref 8.4–10.5)
CHLORIDE SERPL-SCNC: 105 MMOL/L — SIGNIFICANT CHANGE UP (ref 98–110)
CHOLEST SERPL-MCNC: 221 MG/DL — HIGH
CK SERPL-CCNC: 271 U/L — HIGH (ref 0–225)
CO2 SERPL-SCNC: 22 MMOL/L — SIGNIFICANT CHANGE UP (ref 17–32)
CREAT SERPL-MCNC: 2.1 MG/DL — HIGH (ref 0.7–1.5)
EGFR: 22 ML/MIN/1.73M2 — LOW
EOSINOPHIL # BLD AUTO: 0.12 K/UL — SIGNIFICANT CHANGE UP (ref 0–0.7)
EOSINOPHIL NFR BLD AUTO: 1.3 % — SIGNIFICANT CHANGE UP (ref 0–8)
GLUCOSE SERPL-MCNC: 92 MG/DL — SIGNIFICANT CHANGE UP (ref 70–99)
HCT VFR BLD CALC: 38.5 % — SIGNIFICANT CHANGE UP (ref 37–47)
HDLC SERPL-MCNC: 53 MG/DL — SIGNIFICANT CHANGE UP
HGB BLD-MCNC: 11.8 G/DL — LOW (ref 12–16)
IMM GRANULOCYTES NFR BLD AUTO: 0.6 % — HIGH (ref 0.1–0.3)
LIPID PNL WITH DIRECT LDL SERPL: 140 MG/DL — HIGH
LYMPHOCYTES # BLD AUTO: 1.37 K/UL — SIGNIFICANT CHANGE UP (ref 1.2–3.4)
LYMPHOCYTES # BLD AUTO: 15.2 % — LOW (ref 20.5–51.1)
MAGNESIUM SERPL-MCNC: 2.1 MG/DL — SIGNIFICANT CHANGE UP (ref 1.8–2.4)
MCHC RBC-ENTMCNC: 29.3 PG — SIGNIFICANT CHANGE UP (ref 27–31)
MCHC RBC-ENTMCNC: 30.6 G/DL — LOW (ref 32–37)
MCV RBC AUTO: 95.5 FL — SIGNIFICANT CHANGE UP (ref 81–99)
MONOCYTES # BLD AUTO: 0.99 K/UL — HIGH (ref 0.1–0.6)
MONOCYTES NFR BLD AUTO: 11 % — HIGH (ref 1.7–9.3)
NEUTROPHILS # BLD AUTO: 6.4 K/UL — SIGNIFICANT CHANGE UP (ref 1.4–6.5)
NEUTROPHILS NFR BLD AUTO: 71.1 % — SIGNIFICANT CHANGE UP (ref 42.2–75.2)
NON HDL CHOLESTEROL: 168 MG/DL — HIGH
NRBC # BLD: 0 /100 WBCS — SIGNIFICANT CHANGE UP (ref 0–0)
PLATELET # BLD AUTO: 242 K/UL — SIGNIFICANT CHANGE UP (ref 130–400)
PMV BLD: 10.2 FL — SIGNIFICANT CHANGE UP (ref 7.4–10.4)
POTASSIUM SERPL-MCNC: 4.6 MMOL/L — SIGNIFICANT CHANGE UP (ref 3.5–5)
POTASSIUM SERPL-SCNC: 4.6 MMOL/L — SIGNIFICANT CHANGE UP (ref 3.5–5)
PROT SERPL-MCNC: 6 G/DL — SIGNIFICANT CHANGE UP (ref 6–8)
RBC # BLD: 4.03 M/UL — LOW (ref 4.2–5.4)
RBC # FLD: 13.9 % — SIGNIFICANT CHANGE UP (ref 11.5–14.5)
SODIUM SERPL-SCNC: 139 MMOL/L — SIGNIFICANT CHANGE UP (ref 135–146)
TRIGL SERPL-MCNC: 139 MG/DL — SIGNIFICANT CHANGE UP
WBC # BLD: 9 K/UL — SIGNIFICANT CHANGE UP (ref 4.8–10.8)
WBC # FLD AUTO: 9 K/UL — SIGNIFICANT CHANGE UP (ref 4.8–10.8)

## 2024-07-08 PROCEDURE — 99221 1ST HOSP IP/OBS SF/LOW 40: CPT

## 2024-07-08 PROCEDURE — 99232 SBSQ HOSP IP/OBS MODERATE 35: CPT

## 2024-07-08 RX ORDER — CEFPODOXIME PROXETIL 50 MG/5 ML
1 SUSPENSION, RECONSTITUTED, ORAL (ML) ORAL
Qty: 4 | Refills: 0
Start: 2024-07-08 | End: 2024-07-09

## 2024-07-08 RX ORDER — AMLODIPINE BESYLATE 2.5 MG/1
2.5 TABLET ORAL DAILY
Refills: 0 | Status: DISCONTINUED | OUTPATIENT
Start: 2024-07-08 | End: 2024-07-11

## 2024-07-08 RX ORDER — ATORVASTATIN CALCIUM 20 MG/1
1 TABLET, FILM COATED ORAL
Qty: 30 | Refills: 0
Start: 2024-07-08 | End: 2024-08-06

## 2024-07-08 RX ORDER — AMLODIPINE BESYLATE 2.5 MG/1
5 TABLET ORAL ONCE
Refills: 0 | Status: COMPLETED | OUTPATIENT
Start: 2024-07-08 | End: 2024-07-08

## 2024-07-08 RX ORDER — AMLODIPINE BESYLATE 2.5 MG/1
1 TABLET ORAL
Qty: 30 | Refills: 0
Start: 2024-07-08 | End: 2024-08-06

## 2024-07-08 RX ORDER — ASPIRIN 325 MG/1
1 TABLET, FILM COATED ORAL
Qty: 30 | Refills: 0
Start: 2024-07-08 | End: 2024-08-06

## 2024-07-08 RX ORDER — CALCIUM CARBONATE/VITAMIN D2 250 MG-125
1 TABLET ORAL
Qty: 30 | Refills: 0
Start: 2024-07-08 | End: 2024-08-06

## 2024-07-08 RX ADMIN — ATORVASTATIN CALCIUM 40 MILLIGRAM(S): 20 TABLET, FILM COATED ORAL at 21:09

## 2024-07-08 RX ADMIN — Medication 100 MILLIGRAM(S): at 17:26

## 2024-07-08 RX ADMIN — AMLODIPINE BESYLATE 5 MILLIGRAM(S): 2.5 TABLET ORAL at 06:07

## 2024-07-08 RX ADMIN — Medication 1 TABLET(S): at 12:00

## 2024-07-08 RX ADMIN — HEPARIN SODIUM 5000 UNIT(S): 50 INJECTION, SOLUTION INTRAVENOUS at 17:28

## 2024-07-08 RX ADMIN — AMLODIPINE BESYLATE 2.5 MILLIGRAM(S): 2.5 TABLET ORAL at 17:25

## 2024-07-08 RX ADMIN — HEPARIN SODIUM 5000 UNIT(S): 50 INJECTION, SOLUTION INTRAVENOUS at 06:08

## 2024-07-08 RX ADMIN — AZITHROMYCIN 255 MILLIGRAM(S): 250 TABLET, FILM COATED ORAL at 17:25

## 2024-07-08 RX ADMIN — ASPIRIN 81 MILLIGRAM(S): 325 TABLET, FILM COATED ORAL at 12:00

## 2024-07-08 NOTE — DISCHARGE NOTE NURSING/CASE MANAGEMENT/SOCIAL WORK - PATIENT PORTAL LINK FT
You can access the FollowMyHealth Patient Portal offered by [facility] by registering at the following website: [email or web address]. By joining [facility]’s FollowMyHealth portal, you will also be able to view your health information using other applications (apps) compatible with our system.

## 2024-07-08 NOTE — DISCHARGE NOTE PROVIDER - HOSPITAL COURSE
88-year-old female no PMH presents to the ED for evaluation of weakness.  Per EMS patient was found down by her son.  Reports son visited her yesterday and left sometime in the afternoon.  Patient states she felt weak and lowered herself to the ground. Denies head trauma, LOC or AC.  Patient states she was unable to get up and crawled on the ground around the house.  Estimates she was on the floor since 7 PM yesterday.  Patient complains of upper back and right hip pain.  Denies headache, dizziness, syncope/near syncope, chest pain, shortness of breath, abdominal pain, nausea, vomiting, diarrhea, urinary symptoms.    In the ED: vitals were: BP:149/78  HR:110   TEMp: afeb  RR: 19   saturation: 2L NC    labs: wbc 13.62K, K 5.3, BUN/Cr: 56/2.5, lactate 1.4, AST 85, CK 1445 , UA: LE +ve, wbc 114, epithelial cells 8    Imaging: CT head : 1.  No evidence of acute intracranial hemorrhage,  Several age-indeterminate lacunar infarcts. Severe chronic microvascular changes                  Cervical spine: 1.  No evidence of acute cervical spine fracture or subluxation.  Diffuse osteopenia. Multilevel degenerative changes, severe foraminal stenosis from C3- C7    t/t: s/p 2L bolus, azithromycin and ceftriaxone, morphine 4mg stat  admitted for weakness, zeke and elevated CK    Complete inpatient course as follows:  Patient was admitted for UTI, ZEKE, rhabdo 2/2 fall and weakness. Patient was treated with azithro for 3 days and ceftriaxone for 3 days and discharged on 2 days of cefpodoxime. ZEKE and rhabdo resolved with IV fluids, downtrending creatinine and creatine kinase. Patient was hyperkalemic and treated with Lokelma with normalization of K. MRI head was ordered and showed severe chronic microinfarcts and the patient was started on aspirin and atorvastatin. PT evaluated patient and recommended dc to rehab facility or home rehab and the patient expressed desire for dc home with [facility] and home rehab. Patient was medically stable for discharge.     Generalized weakness (r/o infectious etiology)  - Ddx: UTI vs. CAP   - Sepsis presents on admission (WBC, HR)   - UA positive.   - CXR shows Low lung volumes with small left basilar opacity/atelectasis.  - c/w Azithro and Ceftriaxone for now.   - BCx NGTD  - f/u UCx, atypical PNA panel  - supportive care.   - fall precaution.   - PT eval: Sub acute rehab  --> PT f/up  - CT head: several age-indeterminate lacunar infarct. neuro eval.  -MR head: Severe chronic microvascular type changes as well as multiple chronic lacunar infarcts. Numerous scattered chronic microhemorrhages and system with hypertensive microangiopathy.    Rhabdomyolysis likely 2/2 fall   - CTH: No evidence of acute intracranial hemorrhage. Several age-indeterminate lacunar infarcts. Severe chronic microvascular changes.  - s/p 2L LR bolus given in ED.   - c/w IVF NS @ 75cc/hr for now. encourage po intake.  - repeat total CK improving.    ZEKE and Hyperkalemia  - serum Cr 2.5 on admission. repeat cr. 1.9 today   - c/w NS @ 75cc/hr for now. encourage po intake.  - s/p Lokelma 5g x 1 given in ED.   repeat K wnl  - renal US and urine studies ordered.  - avoid nephrotoxic drugs.     Cervical spine stenosis  - Diffuse osteopenia + multilevel degenerative changes on CT c-spine.  - No red flags.   - f/u outpatient with neurosurgery. 88-year-old female no PMH presents to the ED for evaluation of weakness.  Per EMS patient was found down by her son.  Reports son visited her yesterday and left sometime in the afternoon.  Patient states she felt weak and lowered herself to the ground. Denies head trauma, LOC or AC.  Patient states she was unable to get up and crawled on the ground around the house.  Estimates she was on the floor since 7 PM yesterday.  Patient complains of upper back and right hip pain.  Denies headache, dizziness, syncope/near syncope, chest pain, shortness of breath, abdominal pain, nausea, vomiting, diarrhea, urinary symptoms.    In the ED: vitals were: BP:149/78  HR:110   TEMp: afeb  RR: 19   saturation: 2L NC    labs: wbc 13.62K, K 5.3, BUN/Cr: 56/2.5, lactate 1.4, AST 85, CK 1445 , UA: LE +ve, wbc 114, epithelial cells 8    Imaging: CT head : 1.  No evidence of acute intracranial hemorrhage,  Several age-indeterminate lacunar infarcts. Severe chronic microvascular changes                  Cervical spine: 1.  No evidence of acute cervical spine fracture or subluxation.  Diffuse osteopenia. Multilevel degenerative changes, severe foraminal stenosis from C3- C7    t/t: s/p 2L bolus, azithromycin and ceftriaxone, morphine 4mg stat  admitted for weakness, zeke and elevated CK    Complete inpatient course as follows:  Patient was admitted for UTI, ZEKE, rhabdo 2/2 fall and weakness. Patient was treated with azithro for 3 days and ceftriaxone for 3 days and will be discharged on 2 days of cefpodoxime. ZEKE and rhabdo resolved with IV fluids, downtrending creatinine and creatine kinase. Patient was hyperkalemic and treated with Lokelma with normalization of K. MRI head was ordered and showed severe chronic microinfarcts and the patient was started on aspirin and atorvastatin. PT evaluated patient and recommended dc to rehab facility or home rehab and the patient expressed desire for dc home with [facility] and home rehab. Patient medically stable for discharge.      88-year-old female no PMH presents to the ED for evaluation of weakness.  Per EMS patient was found down by her son.  Reports son visited her yesterday and left sometime in the afternoon.  Patient states she felt weak and lowered herself to the ground. Denies head trauma, LOC or AC.  Patient states she was unable to get up and crawled on the ground around the house.  Estimates she was on the floor since 7 PM yesterday.  Patient complains of upper back and right hip pain.  Denies headache, dizziness, syncope/near syncope, chest pain, shortness of breath, abdominal pain, nausea, vomiting, diarrhea, urinary symptoms.    In the ED: vitals were: BP:149/78  HR:110   TEMp: afeb  RR: 19   saturation: 2L NC    labs: wbc 13.62K, K 5.3, BUN/Cr: 56/2.5, lactate 1.4, AST 85, CK 1445 , UA: LE +ve, wbc 114, epithelial cells 8    Imaging: CT head : 1.  No evidence of acute intracranial hemorrhage,  Several age-indeterminate lacunar infarcts. Severe chronic microvascular changes                  Cervical spine: 1.  No evidence of acute cervical spine fracture or subluxation.  Diffuse osteopenia. Multilevel degenerative changes, severe foraminal stenosis from C3- C7    t/t: s/p 2L bolus, azithromycin and ceftriaxone, morphine 4mg stat  admitted for weakness, zeke and elevated CK    Complete inpatient course as follows:  Patient was admitted for UTI/ PNA, ZEKE, rhabdo 2/2 fall and weakness. Patient was treated with azithro and ceftriaxone. ZEKE and rhabdo resolved with IV fluids, downtrending creatinine and creatine kinase. Patient was hyperkalemic and treated with Lokelma with normalization of K. MRI head was ordered and showed severe chronic microinfarcts and the patient was started on aspirin and atorvastatin. PT evaluated patient and recommended dc to rehab facility.

## 2024-07-08 NOTE — PROGRESS NOTE ADULT - ASSESSMENT
87 yo F with no significant PMH presents to ED for generalized weakness, found down on the floor by son.     Generalized weakness (r/o infectious etiology)  - Ddx: UTI vs. CAP   - Sepsis presents on admission (WBC, HR)   - UA positive.   - CXR shows Low lung volumes with small left basilar opacity/atelectasis.  - c/w Azithro and Ceftriaxone for now.   - BCx NGTD  - f/u UCx, atypical PNA panel  - supportive care.   - fall precaution.   - PT eval: Sub acute rehab  --> PT f/up:  rehab vs home w/ PT  - CT head: several age-indeterminate lacunar infarct. neuro eval appreciate  - MR brain: multiple chronic lacunar infarcts  - started on aspirin and atorvastatin    Rhabdomyolysis likely 2/2 fall   - CTH: No evidence of acute intracranial hemorrhage. Several age-indeterminate lacunar infarcts. Severe chronic microvascular changes.  - s/p 2L LR bolus given in ED.   - c/w IVF NS @ 75cc/hr for now. encourage po intake.  - repeat total CK improving.    ZEKE and Hyperkalemia  - serum Cr 2.5 on admission. repeat cr. 2.1 today   - c/w NS @ 75cc/hr for now. encourage po intake.  - s/p Lokelma 5g x 1 given in ED.   repeat K wnl  - renal US and urine studies ordered.  - avoid nephrotoxic drugs.     Cervical spine stenosis  - Diffuse osteopenia + multilevel degenerative changes on CT c-spine.  - No red flags.   - f/u outpatient with neurosurgery.    DVT ppx: Heparin SC  GI ppx: PPI  Diet: regular diet  Activity: as tolerated.  Dispo: Patient initially planned for discharge today but later family decide to go discharge to rehab.    Pending: rehab placement.    Total time spent to complete patient's bedside assessment, review medical chart, discuss medical plan of care with covering medical team was more than 35 minutes  with >50% of time spent face to face with patient, discussion with patient/family and/or coordination of care.

## 2024-07-08 NOTE — DISCHARGE NOTE PROVIDER - CARE PROVIDERS DIRECT ADDRESSES
[email or web address]

## 2024-07-08 NOTE — DISCHARGE NOTE PROVIDER - CARE PROVIDER_API CALL
Ela Berman  Internal Medicine  [address] [place] [address]  Phone: [phone]  Fax: [phone]  Follow Up Time:    Ela Berman  Internal Medicine  [address] [place] [address]  Phone: [phone]  Fax: [phone]  Follow Up Time:     Jacob Solano  Neurology  [address] [place] [address]  Phone: [phone]  Fax: [phone]  Follow Up Time:

## 2024-07-08 NOTE — DISCHARGE NOTE PROVIDER - PROVIDER TOKENS
PROVIDER:[TOKEN:[[id number]]] PROVIDER:[TOKEN:[[id number]]],PROVIDER:[TOKEN:[[id number]]]

## 2024-07-08 NOTE — DISCHARGE NOTE PROVIDER - NSDCCPCAREPLAN_GEN_ALL_CORE_FT
PRINCIPAL DISCHARGE DIAGNOSIS  Diagnosis: Sepsis  Assessment and Plan of Treatment: You were admitted to the hospital due to weakness. A MRI of your head showed no acute injuries or issues but did show chronic changes for which you were started on atorvastatin and aspirin. You were evaluated by physical therapy who recommended you receive physical therapy outpatient. You were treated  for a urinary tract infection with antibiotics and discharged on antibiotics. Please take all of the medications as prescribed and follow up with your primary care provider in 1-2 weeks. If you develop severe weakness, fever, chills and burning with urination please seek immediate medical attention.      SECONDARY DISCHARGE DIAGNOSES  Diagnosis: Rhabdomyolysis  Assessment and Plan of Treatment:     Diagnosis: ZEKE (acute kidney injury)  Assessment and Plan of Treatment: You were found to have an acute injury when you were admitted. This was treated by giving you IV fluids and was resolved. Please follow up with your primary care provider in 1-2 weeks.     PRINCIPAL DISCHARGE DIAGNOSIS  Diagnosis: Sepsis  Assessment and Plan of Treatment: You were admitted to the hospital due to weakness. A MRI of your head showed no acute injuries or issues but did show chronic changes for which you were started on atorvastatin and aspirin. You were evaluated by physical therapy who recommended you receive physical therapy outpatient. You were treated  for a urinary tract infection with antibiotics and discharged on antibiotics. Please take all of the medications as prescribed and follow up with your primary care provider in 1-2 weeks. If you develop severe weakness, fever, chills and burning with urination please seek immediate medical attention.      SECONDARY DISCHARGE DIAGNOSES  Diagnosis: Rhabdomyolysis  Assessment and Plan of Treatment:     Diagnosis: ZEKE (acute kidney injury)  Assessment and Plan of Treatment: You were found to have an acute injury when you were admitted. This was treated by giving you IV fluids and was resolved. Please follow up with your primary care provider in 1-2 weeks.    Diagnosis: Lacunar infarction  Assessment and Plan of Treatment: CT and MRI brain noted for chronic lacunar infarcts. Seen by Neuro.  You are being d/leela home on aspirin 81 mg, atorvastatin 40 mg and amlodipine 2.5 mg daily.  Please take your medications as prescribed and follow up with your PMD and neurologist in 1-2 weeks after discharge.     PRINCIPAL DISCHARGE DIAGNOSIS  Diagnosis: Sepsis  Assessment and Plan of Treatment: You were admitted to the hospital due to weakness. A MRI of your head showed no acute injuries or issues but did show chronic changes for which you were started on atorvastatin and aspirin. You were evaluated by physical therapy who recommended you receive physical therapy outpatient. You were treated  for a urinary tract infection/pnemonia  with antibiotics. Please take all of the medications as prescribed and follow up with your primary care provider in 1-2 weeks. If you develop severe weakness, fever, chills and burning with urination please seek immediate medical attention.      SECONDARY DISCHARGE DIAGNOSES  Diagnosis: Rhabdomyolysis  Assessment and Plan of Treatment: improved with IV fluids    Diagnosis: ZEKE (acute kidney injury)  Assessment and Plan of Treatment: You were found to have an acute kidney injury when you were admitted. This was treated by giving you IV fluids and was resolved. Please follow up with your primary care provider in 1-2 weeks.    Diagnosis: Lacunar infarction  Assessment and Plan of Treatment: CT and MRI brain noted for chronic lacunar infarcts. Seen by Neuro.  You are being d/leela home on aspirin 81 mg, atorvastatin 40 mg and amlodipine 2.5 mg daily.  Please take your medications as prescribed and follow up with your PMD and neurologist in 1-2 weeks after discharge.

## 2024-07-08 NOTE — DISCHARGE NOTE PROVIDER - NSDCMRMEDTOKEN_GEN_ALL_CORE_FT
aspirin 81 mg oral tablet, chewable: 1 tab(s) orally once a day  atorvastatin 40 mg oral tablet: 1 tab(s) orally once a day (at bedtime)  cefpodoxime 100 mg oral tablet: 1 tab(s) orally 2 times a day  vitamin D-calcium (as carbonate) 5 mcg-500 mg oral tablet: 1 tab(s) orally once a day   amLODIPine 2.5 mg oral tablet: 1 tab(s) orally once a day  aspirin 81 mg oral tablet, chewable: 1 tab(s) orally once a day  atorvastatin 40 mg oral tablet: 1 tab(s) orally once a day (at bedtime)  cefpodoxime 100 mg oral tablet: 1 tab(s) orally 2 times a day  vitamin D-calcium (as carbonate) 5 mcg-500 mg oral tablet: 1 tab(s) orally once a day   amLODIPine 2.5 mg oral tablet: 1 tab(s) orally once a day  aspirin 81 mg oral tablet, chewable: 1 tab(s) orally once a day  atorvastatin 40 mg oral tablet: 1 tab(s) orally once a day (at bedtime)  vitamin D-calcium (as carbonate) 5 mcg-500 mg oral tablet: 1 tab(s) orally once a day

## 2024-07-08 NOTE — DISCHARGE NOTE NURSING/CASE MANAGEMENT/SOCIAL WORK - NSDCPEFALRISK_GEN_ALL_CORE
For information on Fall & Injury Prevention, visit: [email or web address] OR  [email or web address] OR  https://www.cdc.gov/steadi/patient.html

## 2024-07-08 NOTE — DISCHARGE NOTE PROVIDER - NSDCFUSCHEDAPPT_GEN_ALL_CORE_FT
Ela Berman  [facility] PreAdmits  Scheduled Appointment: 07/31/2024    Ela Berman  [facility]  [address]  Scheduled Appointment: 07/31/2024

## 2024-07-08 NOTE — DISCHARGE NOTE PROVIDER - ATTENDING DISCHARGE PHYSICAL EXAMINATION:
VITALS:  Vital Signs Last 24 Hrs  T(C): 37.2 (08 Jul 2024 05:04), Max: 37.2 (08 Jul 2024 05:04)  T(F): 98.9 (08 Jul 2024 05:04), Max: 98.9 (08 Jul 2024 05:04)  HR: 91 (08 Jul 2024 05:04) (91 - 91)  BP: 159/75 (08 Jul 2024 05:04) (159/75 - 159/75)  RR: 18 (08 Jul 2024 05:04) (18 - 18)  SpO2: 100% (08 Jul 2024 05:04) (100% - 100%)      PHYSICAL EXAM:  GENERAL: NAD  CHEST/LUNG: CTAB; No wheeze  HEART: RRR; S1/S2  ABDOMEN: Soft, NT/ND; BS present  EXTREMITIES:  No cyanosis, or edema  NEUROLOGY: AAOx3  SKIN: No rashes or lesions on visible areas     PHYSICAL EXAM:  GENERAL: NAD  CHEST/LUNG: CTAB; No wheeze  HEART: RRR; S1/S2  ABDOMEN: Soft, NT/ND; BS present  EXTREMITIES:  No cyanosis, or edema  NEUROLOGY: alert, pleasant   SKIN: No rashes or lesions on visible areas

## 2024-07-09 PROCEDURE — 99232 SBSQ HOSP IP/OBS MODERATE 35: CPT

## 2024-07-09 RX ADMIN — ATORVASTATIN CALCIUM 40 MILLIGRAM(S): 20 TABLET, FILM COATED ORAL at 21:52

## 2024-07-09 RX ADMIN — HEPARIN SODIUM 5000 UNIT(S): 50 INJECTION, SOLUTION INTRAVENOUS at 05:30

## 2024-07-09 RX ADMIN — HEPARIN SODIUM 5000 UNIT(S): 50 INJECTION, SOLUTION INTRAVENOUS at 17:07

## 2024-07-09 RX ADMIN — AMLODIPINE BESYLATE 2.5 MILLIGRAM(S): 2.5 TABLET ORAL at 05:31

## 2024-07-09 RX ADMIN — Medication 100 MILLIGRAM(S): at 15:39

## 2024-07-09 RX ADMIN — AZITHROMYCIN 255 MILLIGRAM(S): 250 TABLET, FILM COATED ORAL at 15:39

## 2024-07-09 RX ADMIN — ASPIRIN 81 MILLIGRAM(S): 325 TABLET, FILM COATED ORAL at 11:32

## 2024-07-09 RX ADMIN — Medication 1 TABLET(S): at 11:32

## 2024-07-09 NOTE — PROGRESS NOTE ADULT - ASSESSMENT
88-year-old female no PMH presents to the ED for evaluation of unwitnessed fall.  Per EMS patient was found down by her son. Patient states she felt weak and lowered herself to the ground. Denies head trauma, LOC or AC. Patient complains of upper back and right hip pain.       #Generalized weakness  - Sepsis presents on admission (WBC, HR)   - UA positive.   - CXR shows Low lung volumes with small left basilar opacity/atelectasis.  - c/w Azithro and Ceftriaxone  - BCx NGTD  - Urine cx likely contaminated   - PT eval: Sub acute rehab    - CT head: several age-indeterminate lacunar infarct. neuro eval appreciate  - MR brain: multiple chronic lacunar infarcts  - started on aspirin and atorvastatin    Rhabdomyolysis likely 2/2 fall   - s/p 2L LR bolus given in ED.   - c/w IVF NS @ 75cc/hr for now. encourage po intake.  - total CK downtrending     ZEKE and Hyperkalemia  - serum Cr 2.5 on admission. repeat cr. 2.1 yesterday   - c/w NS @ 75cc/hr for now. encourage po intake.  - s/p Lokelma 5g x 1 given in ED.   repeat K wnl  - renal US and urine studies ordered.  - avoid nephrotoxic drugs.     Cervical spine stenosis  - Diffuse osteopenia + multilevel degenerative changes on CT c-spine.  - No red flags.   - f/u outpatient with neurosurgery.    DVT ppx: Heparin SC  GI ppx: PPI  Diet: regular diet  Activity: as tolerated.  Dispo: Patient initially planned for discharge yesterday but later family decide to go discharge to rehab.    Pending: rehab placement. 88-year-old female no PMH presents to the ED for evaluation of unwitnessed fall.  Per EMS patient was found down by her son. Patient states she felt weak and lowered herself to the ground. Denies head trauma, LOC or AC. Patient complains of upper back and right hip pain.       #Generalized weakness  - Sepsis presents on admission (WBC, HR)   - UA positive.   - CXR shows Low lung volumes with small left basilar opacity/atelectasis.  - completed Azithro and Ceftriaxone course.  - BCx NGTD  - Urine cx likely contaminated   - PT eval: Sub acute rehab    - CT head: several age-indeterminate lacunar infarct. neuro eval appreciate  - MR brain: multiple chronic lacunar infarcts  - started on aspirin and atorvastatin    Rhabdomyolysis likely 2/2 fall   - s/p 2L LR bolus given in ED.   - s/p IVF  - total CK downtrending     ZEKE and Hyperkalemia  - serum Cr 2.5 on admission. repeat cr. 2.1 (7/8)  - s/p NS @ 75cc/hr for now. encourage po intake.  - s/p Lokelma 5g x 1 given in ED.   repeat K wnl  - renal US and urine studies ordered.  - avoid nephrotoxic drugs.     Cervical spine stenosis  - Diffuse osteopenia + multilevel degenerative changes on CT c-spine.  - No red flags.   - f/u outpatient with neurosurgery.    DVT ppx: Heparin SC  GI ppx: PPI  Diet: regular diet  Activity: as tolerated.  Dispo: Patient initially planned for discharge yesterday but later family decide to go discharge to rehab.    Pending: rehab placement.

## 2024-07-09 NOTE — PROGRESS NOTE ADULT - ATTENDING COMMENTS
88-year-old female no PMH presents to the ED for evaluation of unwitnessed fall.  Per EMS patient was found down by her son. Patient states she felt weak and lowered herself to the ground. Denies head trauma, LOC or AC. Patient complains of upper back and right hip pain.       #Generalized weakness  - Sepsis presents on admission (WBC, HR)   - UA positive.   - CXR shows Low lung volumes with small left basilar opacity/atelectasis.  - completed Azithro and Ceftriaxone course.  - BCx NGTD  - Urine cx likely contaminated   - PT eval: Sub acute rehab    - CT head: several age-indeterminate lacunar infarct. neuro eval appreciate  - MR brain: multiple chronic lacunar infarcts  - started on aspirin and atorvastatin    Rhabdomyolysis likely 2/2 fall   - s/p 2L LR bolus given in ED.   - s/p IVF  - total CK downtrending     ZEKE and Hyperkalemia  - serum Cr 2.5 on admission. repeat cr. 2.1 (7/8)  - s/p NS @ 75cc/hr for now. encourage po intake.  - s/p Lokelma 5g x 1 given in ED.   repeat K wnl  - renal US and urine studies ordered.  - avoid nephrotoxic drugs.     Cervical spine stenosis  - Diffuse osteopenia + multilevel degenerative changes on CT c-spine.  - No red flags.   - f/u outpatient with neurosurgery.    DVT ppx: Heparin SC  GI ppx: PPI  Diet: regular diet  Activity: as tolerated.  Dispo: Patient initially planned for discharge yesterday but later family decide to go discharge to rehab.    Pending: rehab placement.      Total time spent to complete patient's bedside assessment, review medical chart, discuss medical plan of care with covering medical team was more than 35 minutes  with >50% of time spent face to face with patient, discussion with patient/family and/or coordination of care.

## 2024-07-10 LAB
CULTURE RESULTS: SIGNIFICANT CHANGE UP
CULTURE RESULTS: SIGNIFICANT CHANGE UP
SPECIMEN SOURCE: SIGNIFICANT CHANGE UP
SPECIMEN SOURCE: SIGNIFICANT CHANGE UP

## 2024-07-10 PROCEDURE — 99232 SBSQ HOSP IP/OBS MODERATE 35: CPT

## 2024-07-10 RX ADMIN — Medication 1 TABLET(S): at 11:48

## 2024-07-10 RX ADMIN — HEPARIN SODIUM 5000 UNIT(S): 50 INJECTION, SOLUTION INTRAVENOUS at 17:40

## 2024-07-10 RX ADMIN — AMLODIPINE BESYLATE 2.5 MILLIGRAM(S): 2.5 TABLET ORAL at 06:06

## 2024-07-10 RX ADMIN — ASPIRIN 81 MILLIGRAM(S): 325 TABLET, FILM COATED ORAL at 11:48

## 2024-07-10 RX ADMIN — HEPARIN SODIUM 5000 UNIT(S): 50 INJECTION, SOLUTION INTRAVENOUS at 06:06

## 2024-07-10 RX ADMIN — ATORVASTATIN CALCIUM 40 MILLIGRAM(S): 20 TABLET, FILM COATED ORAL at 21:22

## 2024-07-10 NOTE — PROGRESS NOTE ADULT - ATTENDING COMMENTS
88-year-old female no PMH presents to the ED for evaluation of unwitnessed fall.  Per EMS patient was found down by her son. Patient states she felt weak and lowered herself to the ground. Denies head trauma, LOC or AC. Patient complains of upper back and right hip pain.       #Generalized weakness  - Sepsis presents on admission (WBC, HR)   - UA positive.   - CXR shows Low lung volumes with small left basilar opacity/atelectasis.  - completed Azithro and Ceftriaxone course.  - BCx NGTD  - Urine cx likely contaminated   - PT eval: Sub acute rehab    - CT head: several age-indeterminate lacunar infarct. neuro eval appreciate  - MR brain: multiple chronic lacunar infarcts  - started on aspirin and atorvastatin    Rhabdomyolysis likely 2/2 fall   - s/p 2L LR bolus given in ED.   - s/p IVF  - total CK downtrending     ZEKE and Hyperkalemia  - serum Cr 2.5 on admission. repeat cr. 2.1 and stable for 3d   - s/p NS @ 75cc/hr for now. encourage po intake.  - s/p Lokelma 5g x 1 given in ED.   repeat K wnl  - renal US and urine studies ordered.  - avoid nephrotoxic drugs.     Cervical spine stenosis  - Diffuse osteopenia + multilevel degenerative changes on CT c-spine.  - No red flags.   - f/u outpatient with neurosurgery.    DVT ppx: Heparin SC    Dispo: dc planning to [facility]         Total time spent to complete patient's bedside assessment, review medical chart, discuss medical plan of care with covering medical team was more than 35 minutes  with >50% of time spent face to face with patient, discussion with patient/family and/or coordination of care.       Farida Lockwood DO

## 2024-07-10 NOTE — PHARMACOTHERAPY INTERVENTION NOTE - COMMENTS
Recommended (on 7/9) to discontinue the ceftriaxone order since patient had received 5 days of ceftriaxone therapy.    Indu Briones, PharmD, [facility]  Clinical Pharmacy Specialist, Infectious Diseases  Tele-Antimicrobial Stewardship Program (Tele-ASP)  Tele-ASP Phone: [phone]  
Recommended (on 7/9) to discontinue the azithromycin order since patient had received 5 days of azithromycin therapy.    Indu Briones, PharmD, [facility]  Clinical Pharmacy Specialist, Infectious Diseases  Tele-Antimicrobial Stewardship Program (Tele-ASP)  Tele-ASP Phone: [phone]

## 2024-07-10 NOTE — PROGRESS NOTE ADULT - ASSESSMENT
88-year-old female no PMH presents to the ED for evaluation of unwitnessed fall.  Per EMS patient was found down by her son. Patient states she felt weak and lowered herself to the ground. Denies head trauma, LOC or AC. Patient complains of upper back and right hip pain.       #Generalized weakness  - Sepsis presents on admission (WBC, HR)   - UA positive.   - CXR shows Low lung volumes with small left basilar opacity/atelectasis.  - completed Azithro and Ceftriaxone course.  - BCx NGTD  - Urine cx likely contaminated   - PT eval: Sub acute rehab    - CT head: several age-indeterminate lacunar infarct. neuro eval appreciate  - MR brain: multiple chronic lacunar infarcts  - started on aspirin and atorvastatin    Rhabdomyolysis likely 2/2 fall   - s/p 2L LR bolus given in ED.   - s/p IVF  - total CK downtrending     ZEKE and Hyperkalemia  - serum Cr 2.5 on admission. repeat cr. 2.1 (7/8)  - s/p NS @ 75cc/hr for now. encourage po intake.  - s/p Lokelma 5g x 1 given in ED.   repeat K wnl  - renal US and urine studies ordered.  - avoid nephrotoxic drugs.     Cervical spine stenosis  - Diffuse osteopenia + multilevel degenerative changes on CT c-spine.  - No red flags.   - f/u outpatient with neurosurgery.    DVT ppx: Heparin SC  GI ppx: PPI  Diet: regular diet  Activity: as tolerated.  Dispo: Patient initially planned for discharge home but later family decide to go discharge to rehab.    Pending: Son's decision on which rehab facility for placement.

## 2024-07-11 VITALS — TEMPERATURE: 98 F | HEART RATE: 103 BPM | SYSTOLIC BLOOD PRESSURE: 131 MMHG | DIASTOLIC BLOOD PRESSURE: 67 MMHG

## 2024-07-11 PROCEDURE — 99239 HOSP IP/OBS DSCHRG MGMT >30: CPT

## 2024-07-11 RX ADMIN — AMLODIPINE BESYLATE 2.5 MILLIGRAM(S): 2.5 TABLET ORAL at 05:51

## 2024-07-11 RX ADMIN — HEPARIN SODIUM 5000 UNIT(S): 50 INJECTION, SOLUTION INTRAVENOUS at 05:50

## 2024-07-11 RX ADMIN — HEPARIN SODIUM 5000 UNIT(S): 50 INJECTION, SOLUTION INTRAVENOUS at 17:36

## 2024-07-11 NOTE — PROGRESS NOTE ADULT - ASSESSMENT
88-year-old female no PMH presents to the ED for evaluation of unwitnessed fall.  Per EMS patient was found down by her son. Patient states she felt weak and lowered herself to the ground. Denies head trauma, LOC or AC. Patient complains of upper back and right hip pain.       #Generalized weakness  - Sepsis presents on admission (WBC, HR)   - UA positive.   - CXR shows Low lung volumes with small left basilar opacity/atelectasis.  - completed Azithro and Ceftriaxone course.  - BCx NGTD  - Urine cx likely contaminated   - PT eval: Sub acute rehab    - CT head: several age-indeterminate lacunar infarct. neuro eval appreciate  - MR brain: multiple chronic lacunar infarcts  - started on aspirin and atorvastatin    Rhabdomyolysis likely 2/2 fall   - s/p 2L LR bolus given in ED.   - s/p IVF  - total CK downtrending     ZEKE and Hyperkalemia  - serum Cr 2.5 on admission. repeat cr. 2.1 (7/8)  - s/p NS @ 75cc/hr for now. encourage po intake.  - s/p Lokelma 5g x 1 given in ED.   repeat K wnl  - renal US and urine studies ordered.  - avoid nephrotoxic drugs.     Cervical spine stenosis  - Diffuse osteopenia + multilevel degenerative changes on CT c-spine.  - No red flags.   - f/u outpatient with neurosurgery.    DVT ppx: Heparin SC  GI ppx: PPI  Diet: regular diet  Activity: as tolerated.  Dispo: pending acceptance to [facility]

## 2024-07-11 NOTE — PROGRESS NOTE ADULT - SUBJECTIVE AND OBJECTIVE BOX
SUBJECTIVE/OVERNIGHT EVENTS  Today is hospital day 5d. This morning patient was seen and examined at bedside, resting comfortably in bed. No acute or major events overnight.      MEDICATIONS  STANDING MEDICATIONS  amLODIPine   Tablet 2.5 milliGRAM(s) Oral daily  aspirin  chewable 81 milliGRAM(s) Oral daily  atorvastatin 40 milliGRAM(s) Oral at bedtime  calcium carbonate 1250 mG  + Vitamin D (OsCal 500 + D) 1 Tablet(s) Oral daily  heparin   Injectable 5000 Unit(s) SubCutaneous every 12 hours  sodium chloride 0.9%. 1000 milliLiter(s) IV Continuous <Continuous>    PRN MEDICATIONS    VITALS  T(F): 97.8 (07-10-24 @ 04:45), Max: 98 (07-09-24 @ 20:39)  HR: 74 (07-10-24 @ 04:45) (74 - 91)  BP: 138/65 (07-10-24 @ 04:45) (120/70 - 138/65)  RR: 18 (07-10-24 @ 04:45) (18 - 18)  SpO2: 98% (07-10-24 @ 04:45) (97% - 98%)    PHYSICAL EXAM  GENERAL  (x  ) NAD, lying in bed comfortably     (  ) obtunded     (  ) lethargic     (  ) somnolent    HEAD  ( x ) Atraumatic     (  ) hematoma     (  ) laceration (specify location:       )     NECK  (x  ) Supple     (  ) neck stiffness     (  ) nuchal rigidity     (  )  no JVD     (  ) JVD present ( -- cm)    HEART  Rate -->  ( x ) normal rate    (  ) bradycardic    (  ) tachycardic  Rhythm -->  (x  ) regular    (  ) regularly irregular    (  ) irregularly irregular  Murmurs -->  (x  ) normal s1/s2    (  ) systolic murmur    (  ) diastolic murmur    (  ) continuous murmur     (  ) S3 present    (  ) S4 present    LUNGS  (x  )Unlabored respirations     (  ) tachypnea  (x  ) B/L air entry     (  ) decreased breath sounds in:  (location     )    ( x ) no adventitious sound     (  ) crackles     (  ) wheezing      (  ) rhonchi      (specify location:       )  (  ) chest wall tenderness (specify location:       )    ABDOMEN  (x  ) Soft     (  ) tense   |   ( xx ) nondistended     (  ) distended   |   ( x ) +BS     (  ) hypoactive bowel sounds     (  ) hyperactive bowel sounds  (x  ) nontender     (  ) RUQ tenderness     (  ) RLQ tenderness     (  ) LLQ tenderness     (  ) epigastric tenderness     (  ) diffuse tenderness  (  ) Splenomegaly      (  ) Hepatomegaly      (  ) Jaundice     (  ) ecchymosis     EXTREMITIES  (x  ) Normal     (  ) Rash     (  ) ecchymosis     (  ) varicose veins      (  ) pitting edema     (  ) non-pitting edema   (  ) ulceration     (  ) gangrene:     (location:     )    NERVOUS SYSTEM  ( x ) A&Ox3     (  ) confused     (  ) lethargic    SKIN  ( x ) No rashes or lesions     (  ) maculopapular rash     (  ) pustules     (  ) vesicles     (  ) ulcer     (  ) ecchymosis     (specify location:     )        LABS                    IMAGING
SUBJECTIVE:    Patient is a 88y old Female who presents with a chief complaint of weakness (05 Jul 2024 19:07)    Currently admitted to medicine with the primary diagnosis of Sepsis    Interval history:  Overnight events noted. no new complaints.    Admit Diagnosis:  Sepsis    PAST MEDICAL & SURGICAL HISTORY      SOCIAL HISTORY:  Negative for smoking/alcohol/drug use.     ALLERGIES:  No Known Allergies      MEDICATIONS:  STANDING MEDICATIONS  azithromycin  IVPB 500 milliGRAM(s) IV Intermittent every 24 hours, 07-06-24 @ 16:00  calcium carbonate 1250 mG  + Vitamin D (OsCal 500 + D) 1 Tablet(s) Oral daily, 07-05-24 @ 19:48  cefTRIAXone   IVPB 1000 milliGRAM(s) IV Intermittent every 24 hours, 07-06-24 @ 16:00  heparin   Injectable 5000 Unit(s) SubCutaneous every 12 hours, 07-06-24 @ 01:45  sodium chloride 0.9%. 1000 milliLiter(s) IV Continuous <Continuous>, 07-05-24 @ 21:13    PRN MEDICATIONS    Diet, Regular (07-05-24 @ 19:44) [Active]      Vital Signs Last 24 Hrs  T(C): 37.3 (07 Jul 2024 13:15), Max: 37.3 (07 Jul 2024 13:15)  T(F): 99.1 (07 Jul 2024 13:15), Max: 99.1 (07 Jul 2024 13:15)  HR: 99 (07 Jul 2024 13:15) (71 - 99)  BP: 135/83 (07 Jul 2024 13:15) (133/62 - 155/73)  RR: 18 (07 Jul 2024 13:15) (18 - 18)  SpO2: 100% (07 Jul 2024 04:26) (100% - 100%)      I&Os:  07-06-24 @ 07:01  -  07-07-24 @ 07:00  --------------------------------------------------------  IN: 150 mL / OUT: 0 mL / NET: 150 mL        LABS:                        11.1   7.18  )-----------( 253      ( 07 Jul 2024 08:17 )             35.0     WBC trend: 7.18 <--, 8.03 <--, 13.62 <--  Hgb: 11.1 [07-07-24 @ 08:17]<--, 12.0 [07-06-24 @ 07:45]<--, 13.6 [07-05-24 @ 14:13]<--    07-07    138  |  103  |  48<H>  ----------------------------<  87  4.5   |  23  |  1.9<H>    Ca    8.3<L>      07 Jul 2024 08:17  Mg     2.1     07-07    TPro  6.1  /  Alb  3.3<L>  /  TBili  0.4  /  DBili  x   /  AST  37  /  ALT  16  /  AlkPhos  85  07-07    Creatinine trend: 1.9<--, 2.1<--, 2.5<--  SODIUM TREND: Sodium 138 [07-07 @ 08:17]<--, Sodium 138 [07-06 @ 07:45]<--, Sodium 137 [07-05 @ 14:13]<--      POC Glucose:       Culture - Blood (collected 07-05-24 @ 17:04)  Source: .Blood Blood-Peripheral  Preliminary Report (07-06-24 @ 23:09):    No growth at 24 hours    Culture - Blood (collected 07-05-24 @ 17:04)  Source: .Blood Blood-Peripheral  Preliminary Report (07-06-24 @ 23:09):    No growth at 24 hours      CARDIAC MARKERS ( 07 Jul 2024 08:17 )  x     / x     / 458 U/L / x     / x      CARDIAC MARKERS ( 06 Jul 2024 07:45 )  x     / x     / 652 U/L / x     / x      CARDIAC MARKERS ( 05 Jul 2024 19:51 )  x     / x     / 1447 U/L / x     / x      CARDIAC MARKERS ( 05 Jul 2024 14:13 )  x     / x     / 1445 U/L / x     / x          D-Dimer Assay, Quantitative: 348 ng/mL DDU (07-06-24 @ 07:45)      Urinalysis Basic - ( 07 Jul 2024 08:17 )    Color: x / Appearance: x / SG: x / pH: x  Gluc: 87 mg/dL / Ketone: x  / Bili: x / Urobili: x   Blood: x / Protein: x / Nitrite: x   Leuk Esterase: x / RBC: x / WBC x   Sq Epi: x / Non Sq Epi: x / Bacteria: x                                              -------------------------------------------------            RADIOLOGY, ECG, & ADDITIONAL TESTS:  12 Lead ECG:   Ventricular Rate 102 BPM  QTC Calculation(Bazett) 453 ms    Diagnosis Line Sinus tachycardia  Otherwise normal ECG    Confirmed by Velvet Ridley MD ([id number]) on 7/5/2024 5:38:22 PM (07-05-24 @ 16:11)        PHYSICAL EXAM:  GEN: No acute distress  LUNGS: Clear to auscultation bilaterally   HEART: S1/S2  ABD: Soft, NT/ND. BS +  EXT: no cyanosis/edema  NEURO: AAOX3  SKIN: no rash on visible areas  
SUBJECTIVE:    Patient is a 88y old Female who presents with a chief complaint of weakness (05 Jul 2024 19:07)    Currently admitted to medicine with the primary diagnosis of Sepsis    Interval history:  Overnight events noted. no new complaints.    Admit Diagnosis:  Sepsis    PAST MEDICAL & SURGICAL HISTORY      SOCIAL HISTORY:  Negative for smoking/alcohol/drug use.     ALLERGIES:  No Known Allergies    MEDICATIONS:  STANDING MEDICATIONS  amLODIPine   Tablet 2.5 milliGRAM(s) Oral daily, 07-08-24 @ 13:34  aspirin  chewable 81 milliGRAM(s) Oral daily, 07-07-24 @ 13:26  atorvastatin 40 milliGRAM(s) Oral at bedtime, 07-07-24 @ 13:24  azithromycin  IVPB 500 milliGRAM(s) IV Intermittent every 24 hours, 07-06-24 @ 16:00  calcium carbonate 1250 mG  + Vitamin D (OsCal 500 + D) 1 Tablet(s) Oral daily, 07-05-24 @ 19:48  cefTRIAXone   IVPB 1000 milliGRAM(s) IV Intermittent every 24 hours, 07-06-24 @ 16:00  heparin   Injectable 5000 Unit(s) SubCutaneous every 12 hours, 07-06-24 @ 01:45  sodium chloride 0.9%. 1000 milliLiter(s) IV Continuous <Continuous>, 07-05-24 @ 21:13    PRN MEDICATIONS    Diet, Regular (07-05-24 @ 19:44) [Active]          Vital Signs Last 24 Hrs  T(C): 37.2 (08 Jul 2024 05:04), Max: 37.2 (08 Jul 2024 05:04)  T(F): 98.9 (08 Jul 2024 05:04), Max: 98.9 (08 Jul 2024 05:04)  HR: 99 (08 Jul 2024 17:54) (91 - 99)  BP: 164/78 (08 Jul 2024 17:54) (159/75 - 164/78)  BP(mean): --  RR: 18 (08 Jul 2024 17:54) (18 - 18)  SpO2: 99% (08 Jul 2024 17:54) (99% - 100%)    Parameters below as of 08 Jul 2024 17:54  Patient On (Oxygen Delivery Method): room air      I&Os:    LABS:                        11.8   9.00  )-----------( 242      ( 08 Jul 2024 09:19 )             38.5     WBC trend: 9.00 <--, 7.18 <--, 8.03 <--, 13.62 <--  Hgb: 11.8 [07-08-24 @ 09:19]<--, 11.1 [07-07-24 @ 08:17]<--, 12.0 [07-06-24 @ 07:45]<--, 13.6 [07-05-24 @ 14:13]<--    07-08    139  |  105  |  44<H>  ----------------------------<  92  4.6   |  22  |  2.1<H>    Ca    8.6      08 Jul 2024 09:19  Mg     2.1     07-08    TPro  6.0  /  Alb  3.5  /  TBili  0.2  /  DBili  x   /  AST  35  /  ALT  18  /  AlkPhos  95  07-08    Creatinine trend: 2.1<--, 1.9<--, 2.1<--, 2.5<--  SODIUM TREND: Sodium 139 [07-08 @ 09:19]<--, Sodium 138 [07-07 @ 08:17]<--, Sodium 138 [07-06 @ 07:45]<--, Sodium 137 [07-05 @ 14:13]<--      POC Glucose:       Culture - Blood (collected 07-05-24 @ 17:04)  Source: .Blood Blood-Peripheral  Preliminary Report (07-07-24 @ 23:02):    No growth at 48 Hours    Culture - Blood (collected 07-05-24 @ 17:04)  Source: .Blood Blood-Peripheral  Preliminary Report (07-07-24 @ 23:02):    No growth at 48 Hours      CARDIAC MARKERS ( 08 Jul 2024 09:19 )  x     / x     / 271 U/L / x     / x      CARDIAC MARKERS ( 07 Jul 2024 08:17 )  x     / x     / 458 U/L / x     / x            D-Dimer Assay, Quantitative: 348 ng/mL DDU (07-06-24 @ 07:45)        Urinalysis Basic - ( 08 Jul 2024 09:19 )    Color: x / Appearance: x / SG: x / pH: x  Gluc: 92 mg/dL / Ketone: x  / Bili: x / Urobili: x   Blood: x / Protein: x / Nitrite: x   Leuk Esterase: x / RBC: x / WBC x   Sq Epi: x / Non Sq Epi: x / Bacteria: x                                              -------------------------------------------------            RADIOLOGY, ECG, & ADDITIONAL TESTS:  12 Lead ECG:   Ventricular Rate 102 BPM  QTC Calculation(Bazett) 453 ms    Diagnosis Line Sinus tachycardia  Otherwise normal ECG    Confirmed by Velvet Ridley MD ([id number]) on 7/5/2024 5:38:22 PM (07-05-24 @ 16:11)    < from: MR Head No Cont (07.07.24 @ 15:52) >  No evidence of acute intracranial pathology. No acute infarct, intracranial hemorrhage, mass effect or midline shift.    Severe chronic microvascular type changes as well as multiple chronic lacunar infarcts.    Numerous scattered chronic microhemorrhages and system with hypertensive microangiopathy.    < end of copied text >      PHYSICAL EXAM:  GEN: No acute distress  LUNGS: Clear to auscultation bilaterally   HEART: S1/S2  ABD: Soft, NT/ND. BS +  EXT: no cyanosis/edema  NEURO: AAOX3  SKIN: no rash on visible areas  
SUBJECTIVE/OVERNIGHT EVENTS  Today is hospital day 6d. This morning patient was seen and examined at bedside, resting comfortably in bed. No acute or major events overnight.      MEDICATIONS  STANDING MEDICATIONS  amLODIPine   Tablet 2.5 milliGRAM(s) Oral daily  aspirin  chewable 81 milliGRAM(s) Oral daily  atorvastatin 40 milliGRAM(s) Oral at bedtime  calcium carbonate 1250 mG  + Vitamin D (OsCal 500 + D) 1 Tablet(s) Oral daily  heparin   Injectable 5000 Unit(s) SubCutaneous every 12 hours  sodium chloride 0.9%. 1000 milliLiter(s) IV Continuous <Continuous>    PRN MEDICATIONS    VITALS  T(F): 97.8 (07-11-24 @ 04:40), Max: 98.5 (07-10-24 @ 20:19)  HR: 86 (07-11-24 @ 04:40) (86 - 105)  BP: 130/73 (07-11-24 @ 04:40) (130/73 - 151/74)  RR: 18 (07-11-24 @ 04:40) (18 - 18)  SpO2: 98% (07-11-24 @ 04:40) (97% - 98%)    PHYSICAL EXAM  GENERAL  (x  ) NAD, lying in bed comfortably     (  ) obtunded     (  ) lethargic     (  ) somnolent    HEAD  ( x ) Atraumatic     (  ) hematoma     (  ) laceration (specify location:       )     NECK  ( x ) Supple     (  ) neck stiffness     (  ) nuchal rigidity     (  )  no JVD     (  ) JVD present ( -- cm)    HEART  Rate -->  ( x ) normal rate    (  ) bradycardic    (  ) tachycardic  Rhythm -->  (  x) regular    (  ) regularly irregular    (  ) irregularly irregular  Murmurs -->  (  x) normal s1/s2    (  ) systolic murmur    (  ) diastolic murmur    (  ) continuous murmur     (  ) S3 present    (  ) S4 present    LUNGS  (x  )Unlabored respirations     (  ) tachypnea  (x  ) B/L air entry     (  ) decreased breath sounds in:  (location     )    (x  ) no adventitious sound     (  ) crackles     (  ) wheezing      (  ) rhonchi      (specify location:       )  (  ) chest wall tenderness (specify location:       )    ABDOMEN  (x  ) Soft     (  ) tense   |   ( x ) nondistended     (  ) distended   |   (x  ) +BS     (  ) hypoactive bowel sounds     (  ) hyperactive bowel sounds  ( x ) nontender     (  ) RUQ tenderness     (  ) RLQ tenderness     (  ) LLQ tenderness     (  ) epigastric tenderness     (  ) diffuse tenderness  (  ) Splenomegaly      (  ) Hepatomegaly      (  ) Jaundice     (  ) ecchymosis     EXTREMITIES  (x  ) Normal     (  ) Rash     (  ) ecchymosis     (  ) varicose veins      (  ) pitting edema     (  ) non-pitting edema   (  ) ulceration     (  ) gangrene:     (location:     )    NERVOUS SYSTEM  (x  ) A&Ox3     (  ) confused     (  ) lethargic    SKIN  (x  ) No rashes or lesions     (  ) maculopapular rash     (  ) pustules     (  ) vesicles     (  ) ulcer     (  ) ecchymosis     (specify location:     )        LABS                    IMAGING
SUBJECTIVE/OVERNIGHT EVENTS  Today is hospital day 4d. This morning patient was seen and examined at bedside, resting comfortably in bed. No acute or major events overnight.      MEDICATIONS  STANDING MEDICATIONS  amLODIPine   Tablet 2.5 milliGRAM(s) Oral daily  aspirin  chewable 81 milliGRAM(s) Oral daily  atorvastatin 40 milliGRAM(s) Oral at bedtime  azithromycin  IVPB 500 milliGRAM(s) IV Intermittent every 24 hours  calcium carbonate 1250 mG  + Vitamin D (OsCal 500 + D) 1 Tablet(s) Oral daily  cefTRIAXone   IVPB 1000 milliGRAM(s) IV Intermittent every 24 hours  heparin   Injectable 5000 Unit(s) SubCutaneous every 12 hours  sodium chloride 0.9%. 1000 milliLiter(s) IV Continuous <Continuous>    PRN MEDICATIONS    VITALS  T(F): 98 (07-09-24 @ 04:49), Max: 98 (07-08-24 @ 21:36)  HR: 95 (07-09-24 @ 04:49) (94 - 99)  BP: 145/68 (07-09-24 @ 04:49) (134/67 - 164/78)  RR: 18 (07-09-24 @ 04:49) (18 - 18)  SpO2: 98% (07-09-24 @ 04:49) (97% - 99%)    PHYSICAL EXAM  GENERAL  ( x ) NAD, lying in bed comfortably     (  ) obtunded     (  ) lethargic     (  ) somnolent    HEAD  ( x ) Atraumatic     (  ) hematoma     (  ) laceration (specify location:       )     NECK  (x  ) Supple     (  ) neck stiffness     (  ) nuchal rigidity     (  )  no JVD     (  ) JVD present ( -- cm)    HEART  Rate -->  ( x ) normal rate    (  ) bradycardic    (  ) tachycardic  Rhythm -->  ( x ) regular    (  ) regularly irregular    (  ) irregularly irregular  Murmurs -->  ( x ) normal s1/s2    (  ) systolic murmur    (  ) diastolic murmur    (  ) continuous murmur     (  ) S3 present    (  ) S4 present    LUNGS  ( x )Unlabored respirations     (  ) tachypnea  ( x ) B/L air entry     (  ) decreased breath sounds in:  (location     )    ( x ) no adventitious sound     (  ) crackles     (  ) wheezing      (  ) rhonchi      (specify location:       )  (  ) chest wall tenderness (specify location:       )    ABDOMEN  (x  ) Soft     (  ) tense   |   ( x ) nondistended     (  ) distended   |   ( x ) +BS     (  ) hypoactive bowel sounds     (  ) hyperactive bowel sounds  ( x ) nontender     (  ) RUQ tenderness     (  ) RLQ tenderness     (  ) LLQ tenderness     (  ) epigastric tenderness     (  ) diffuse tenderness  (  ) Splenomegaly      (  ) Hepatomegaly      (  ) Jaundice     (  ) ecchymosis     EXTREMITIES  (x  ) Normal     (  ) Rash     (  ) ecchymosis     (  ) varicose veins      (  ) pitting edema     (  ) non-pitting edema   (  ) ulceration     (  ) gangrene:     (location:     )    NERVOUS SYSTEM  ( x ) A&Ox3     (  ) confused     (  ) lethargic    SKIN  ( x ) No rashes or lesions     (  ) maculopapular rash     (  ) pustules     (  ) vesicles     (  ) ulcer     (  ) ecchymosis     (specify location:     )      LABS             11.8   9.00  )-----------( 242      ( 07-08-24 @ 09:19 )             38.5     139  |  105  |  44  -------------------------<  92   07-08-24 @ 09:19  4.6  |  22  |  2.1    Ca      8.6     07-08-24 @ 09:19  Mg     2.1     07-08-24 @ 09:19    TPro  6.0  /  Alb  3.5  /  TBili  0.2  /  DBili  x   /  AST  35  /  ALT  18  /  AlkPhos  95  /  GGT  x     07-08-24 @ 09:19        Urinalysis Basic - ( 08 Jul 2024 09:19 )    Color: x / Appearance: x / SG: x / pH: x  Gluc: 92 mg/dL / Ketone: x  / Bili: x / Urobili: x   Blood: x / Protein: x / Nitrite: x   Leuk Esterase: x / RBC: x / WBC x   Sq Epi: x / Non Sq Epi: x / Bacteria: x          IMAGING
SUBJECTIVE:    Patient is a 88y old Female who presents with a chief complaint of weakness (05 Jul 2024 19:07)    Currently admitted to medicine with the primary diagnosis of Sepsis    Interval history:  Overnight events noted. no new complaints.    Admit Diagnosis:  Sepsis    PAST MEDICAL & SURGICAL HISTORY      SOCIAL HISTORY:  Negative for smoking/alcohol/drug use.     ALLERGIES:  No Known Allergies      MEDICATIONS:  STANDING:  azithromycin  IVPB 500 milliGRAM(s) IV Intermittent every 24 hours, 07-06-24 @ 16:00  calcium carbonate 1250 mG  + Vitamin D (OsCal 500 + D) 1 Tablet(s) Oral daily, 07-05-24 @ 19:48  cefTRIAXone   IVPB 1000 milliGRAM(s) IV Intermittent every 24 hours, 07-06-24 @ 16:00  heparin   Injectable 5000 Unit(s) SubCutaneous every 12 hours, 07-06-24 @ 01:45  sodium chloride 0.9%. 1000 milliLiter(s) (75 mL/Hr) IV Continuous <Continuous>, 07-05-24 @ 21:13      PRN:    Diet, Regular (07-05-24 @ 19:44) [Active]          VITALS:   Vital Signs Last 24 Hrs  T(C): 36.7 (06 Jul 2024 13:48), Max: 36.7 (06 Jul 2024 13:48)  T(F): 98.1 (06 Jul 2024 13:48), Max: 98.1 (06 Jul 2024 13:48)  HR: 99 (06 Jul 2024 13:48) (87 - 104)  BP: 153/112 (06 Jul 2024 13:48) (137/73 - 154/75)  RR: 18 (06 Jul 2024 13:48) (18 - 18)  SpO2: 98% (05 Jul 2024 22:00) (98% - 99%)    Parameters below as of 05 Jul 2024 22:00  Patient On (Oxygen Delivery Method): room air      I&Os:  07-05-24 @ 07:01  -  07-06-24 @ 07:00  --------------------------------------------------------  IN: 600 mL / OUT: 0 mL / NET: 600 mL    07-06-24 @ 07:01  -  07-06-24 @ 14:09  --------------------------------------------------------  IN: 150 mL / OUT: 0 mL / NET: 150 mL          LABS:                        12.0   8.03  )-----------( 250      ( 06 Jul 2024 07:45 )             37.7     WBC trend: 8.03 <--, 13.62 <--  Hgb: 12.0 [07-06-24 @ 07:45]<--, 13.6 [07-05-24 @ 14:13]<--    07-06    138  |  102  |  50<H>  ----------------------------<  74  5.0   |  20  |  2.1<H>    Ca    8.4      06 Jul 2024 07:45  Mg     2.2     07-06    TPro  5.5<L>  /  Alb  3.2<L>  /  TBili  0.3  /  DBili  x   /  AST  40  /  ALT  15  /  AlkPhos  73  07-06    Creatinine trend: 2.1<--, 2.5<--  SODIUM TREND: Sodium 138 [07-06 @ 07:45]<--, Sodium 137 [07-05 @ 14:13]<--    POC Glucose:           Urinalysis with Rflx Culture (collected 05 Jul 2024 17:00)        Urinalysis with Rflx Culture (collected 07-05-24 @ 17:00)      CARDIAC MARKERS ( 06 Jul 2024 07:45 )  x     / x     / 652 U/L / x     / x      CARDIAC MARKERS ( 05 Jul 2024 19:51 )  x     / x     / 1447 U/L / x     / x      CARDIAC MARKERS ( 05 Jul 2024 14:13 )  x     / x     / 1445 U/L / x     / x          D-Dimer Assay, Quantitative: 348 ng/mL DDU (07-06-24 @ 07:45)      Urinalysis Basic - ( 06 Jul 2024 07:45 )    Color: x / Appearance: x / SG: x / pH: x  Gluc: 74 mg/dL / Ketone: x  / Bili: x / Urobili: x   Blood: x / Protein: x / Nitrite: x   Leuk Esterase: x / RBC: x / WBC x   Sq Epi: x / Non Sq Epi: x / Bacteria: x                               ------------------------------------------------    RADIOLOGY, ECG, & ADDITIONAL TESTS:  12 Lead ECG:   Ventricular Rate 102 BPM  QTC Calculation(Bazett) 453 ms    Diagnosis Line Sinus tachycardia  Otherwise normal ECG    Confirmed by Velvet Ridley MD ([id number]) on 7/5/2024 5:38:22 PM (07-05-24 @ 16:11)        PHYSICAL EXAM:  GEN: No acute distress  HEAD: atraumatic, normocephalic  EYES: EOMI  ENT: moist mucus membranes  LUNGS: Clear to auscultation bilaterally   HEART: S1/S2  ABD: Soft, NT/ND. BS +  EXT: no cyanosis/edema  NEURO: AAOX3  SKIN: no rash on visible areas

## 2024-07-17 DIAGNOSIS — M85.88 OTHER SPECIFIED DISORDERS OF BONE DENSITY AND STRUCTURE, OTHER SITE: ICD-10-CM
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
## 2024-07-17 DIAGNOSIS — A41.9 SEPSIS, UNSPECIFIED ORGANISM: ICD-10-CM

## 2024-07-17 DIAGNOSIS — J98.11 ATELECTASIS: ICD-10-CM

## 2024-07-17 DIAGNOSIS — N17.9 ACUTE KIDNEY FAILURE, UNSPECIFIED: ICD-10-CM

## 2024-07-17 DIAGNOSIS — Z86.73 PERSONAL HISTORY OF TRANSIENT ISCHEMIC ATTACK (TIA), AND CEREBRAL INFARCTION WITHOUT RESIDUAL DEFICITS: ICD-10-CM

## 2024-07-17 DIAGNOSIS — J18.9 PNEUMONIA, UNSPECIFIED ORGANISM: ICD-10-CM
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
## 2024-07-17 DIAGNOSIS — E87.5 HYPERKALEMIA: ICD-10-CM

## 2024-07-17 DIAGNOSIS — M62.82 RHABDOMYOLYSIS: ICD-10-CM

## 2024-07-17 DIAGNOSIS — N39.0 URINARY TRACT INFECTION, SITE NOT SPECIFIED: ICD-10-CM

## 2024-07-17 DIAGNOSIS — M48.02 SPINAL STENOSIS, CERVICAL REGION: ICD-10-CM

## 2024-07-31 ENCOUNTER — APPOINTMENT (OUTPATIENT)
Dept: INTERNAL MEDICINE | Facility: CLINIC | Age: 89
End: 2024-07-31

## 2025-03-13 PROBLEM — Z00.00 ENCOUNTER FOR PREVENTIVE HEALTH EXAMINATION: Status: ACTIVE | Noted: 2025-03-13

## 2025-03-24 ENCOUNTER — INPATIENT (INPATIENT)
Facility: HOSPITAL | Age: 89
LOS: 9 days | Discharge: NURSING, REHAB OR LONG-TERM CARE | DRG: 82 | End: 2025-04-03
Attending: INTERNAL MEDICINE | Admitting: STUDENT IN AN ORGANIZED HEALTH CARE EDUCATION/TRAINING PROGRAM
Payer: MEDICARE

## 2025-03-24 ENCOUNTER — TRANSCRIPTION ENCOUNTER (OUTPATIENT)
Age: 89
End: 2025-03-24

## 2025-03-24 VITALS
RESPIRATION RATE: 18 BRPM | SYSTOLIC BLOOD PRESSURE: 111 MMHG | TEMPERATURE: 98 F | OXYGEN SATURATION: 95 % | HEART RATE: 101 BPM | DIASTOLIC BLOOD PRESSURE: 61 MMHG

## 2025-03-24 LAB
ALBUMIN SERPL ELPH-MCNC: 3.2 G/DL — LOW (ref 3.5–5.2)
ALP SERPL-CCNC: 146 U/L — HIGH (ref 30–115)
ALT FLD-CCNC: 9 U/L — SIGNIFICANT CHANGE UP (ref 0–41)
ANION GAP SERPL CALC-SCNC: 18 MMOL/L — HIGH (ref 7–14)
APPEARANCE UR: ABNORMAL
AST SERPL-CCNC: 22 U/L — SIGNIFICANT CHANGE UP (ref 0–41)
BASOPHILS # BLD AUTO: 0.06 K/UL — SIGNIFICANT CHANGE UP (ref 0–0.2)
BASOPHILS NFR BLD AUTO: 0.5 % — SIGNIFICANT CHANGE UP (ref 0–1)
BILIRUB SERPL-MCNC: 0.4 MG/DL — SIGNIFICANT CHANGE UP (ref 0.2–1.2)
BILIRUB UR-MCNC: NEGATIVE — SIGNIFICANT CHANGE UP
BUN SERPL-MCNC: 113 MG/DL — CRITICAL HIGH (ref 10–20)
CALCIUM SERPL-MCNC: 8.8 MG/DL — SIGNIFICANT CHANGE UP (ref 8.4–10.5)
CHLORIDE SERPL-SCNC: 99 MMOL/L — SIGNIFICANT CHANGE UP (ref 98–110)
CK SERPL-CCNC: 31 U/L — SIGNIFICANT CHANGE UP (ref 0–225)
CO2 SERPL-SCNC: 19 MMOL/L — SIGNIFICANT CHANGE UP (ref 17–32)
COLOR SPEC: YELLOW — SIGNIFICANT CHANGE UP
CREAT SERPL-MCNC: 3.7 MG/DL — HIGH (ref 0.7–1.5)
DIFF PNL FLD: NEGATIVE — SIGNIFICANT CHANGE UP
EGFR: 11 ML/MIN/1.73M2 — LOW
EGFR: 11 ML/MIN/1.73M2 — LOW
EOSINOPHIL # BLD AUTO: 0.02 K/UL — SIGNIFICANT CHANGE UP (ref 0–0.7)
EOSINOPHIL NFR BLD AUTO: 0.2 % — SIGNIFICANT CHANGE UP (ref 0–8)
GAS PNL BLDV: SIGNIFICANT CHANGE UP
GLUCOSE BLDC GLUCOMTR-MCNC: 90 MG/DL — SIGNIFICANT CHANGE UP (ref 70–99)
GLUCOSE SERPL-MCNC: 91 MG/DL — SIGNIFICANT CHANGE UP (ref 70–99)
GLUCOSE UR QL: NEGATIVE MG/DL — SIGNIFICANT CHANGE UP
HCT VFR BLD CALC: 30.4 % — LOW (ref 37–47)
HGB BLD-MCNC: 9.7 G/DL — LOW (ref 12–16)
IMM GRANULOCYTES NFR BLD AUTO: 0.8 % — HIGH (ref 0.1–0.3)
KETONES UR-MCNC: NEGATIVE MG/DL — SIGNIFICANT CHANGE UP
LEUKOCYTE ESTERASE UR-ACNC: ABNORMAL
LYMPHOCYTES # BLD AUTO: 1.01 K/UL — LOW (ref 1.2–3.4)
LYMPHOCYTES # BLD AUTO: 7.8 % — LOW (ref 20.5–51.1)
MCHC RBC-ENTMCNC: 29.3 PG — SIGNIFICANT CHANGE UP (ref 27–31)
MCHC RBC-ENTMCNC: 31.9 G/DL — LOW (ref 32–37)
MCV RBC AUTO: 91.8 FL — SIGNIFICANT CHANGE UP (ref 81–99)
MONOCYTES # BLD AUTO: 1.06 K/UL — HIGH (ref 0.1–0.6)
MONOCYTES NFR BLD AUTO: 8.2 % — SIGNIFICANT CHANGE UP (ref 1.7–9.3)
NEUTROPHILS # BLD AUTO: 10.72 K/UL — HIGH (ref 1.4–6.5)
NEUTROPHILS NFR BLD AUTO: 82.5 % — HIGH (ref 42.2–75.2)
NITRITE UR-MCNC: NEGATIVE — SIGNIFICANT CHANGE UP
NRBC BLD AUTO-RTO: 0 /100 WBCS — SIGNIFICANT CHANGE UP (ref 0–0)
PH UR: 5.5 — SIGNIFICANT CHANGE UP (ref 5–8)
PLATELET # BLD AUTO: 541 K/UL — HIGH (ref 130–400)
PMV BLD: 8.5 FL — SIGNIFICANT CHANGE UP (ref 7.4–10.4)
POTASSIUM SERPL-MCNC: 5.3 MMOL/L — HIGH (ref 3.5–5)
POTASSIUM SERPL-SCNC: 5.3 MMOL/L — HIGH (ref 3.5–5)
PROT SERPL-MCNC: 6.9 G/DL — SIGNIFICANT CHANGE UP (ref 6–8)
PROT UR-MCNC: 30 MG/DL
RBC # BLD: 3.31 M/UL — LOW (ref 4.2–5.4)
RBC # FLD: 15.1 % — HIGH (ref 11.5–14.5)
SODIUM SERPL-SCNC: 136 MMOL/L — SIGNIFICANT CHANGE UP (ref 135–146)
SP GR SPEC: 1.02 — SIGNIFICANT CHANGE UP (ref 1–1.03)
TROPONIN T, HIGH SENSITIVITY RESULT: 174 NG/L — CRITICAL HIGH (ref 6–13)
TROPONIN T, HIGH SENSITIVITY RESULT: 189 NG/L — CRITICAL HIGH (ref 6–13)
UROBILINOGEN FLD QL: 0.2 MG/DL — SIGNIFICANT CHANGE UP (ref 0.2–1)
WBC # BLD: 12.98 K/UL — HIGH (ref 4.8–10.8)
WBC # FLD AUTO: 12.98 K/UL — HIGH (ref 4.8–10.8)

## 2025-03-24 PROCEDURE — 99285 EMERGENCY DEPT VISIT HI MDM: CPT

## 2025-03-24 PROCEDURE — 72125 CT NECK SPINE W/O DYE: CPT | Mod: 26

## 2025-03-24 PROCEDURE — 71045 X-RAY EXAM CHEST 1 VIEW: CPT | Mod: 26

## 2025-03-24 PROCEDURE — 72170 X-RAY EXAM OF PELVIS: CPT | Mod: 26

## 2025-03-24 PROCEDURE — 70450 CT HEAD/BRAIN W/O DYE: CPT | Mod: 26

## 2025-03-24 PROCEDURE — 74176 CT ABD & PELVIS W/O CONTRAST: CPT | Mod: 26

## 2025-03-24 PROCEDURE — 70450 CT HEAD/BRAIN W/O DYE: CPT | Mod: 26,77

## 2025-03-24 PROCEDURE — 71250 CT THORAX DX C-: CPT | Mod: 26

## 2025-03-24 RX ORDER — SODIUM CHLORIDE 9 G/1000ML
1000 INJECTION, SOLUTION INTRAVENOUS ONCE
Refills: 0 | Status: COMPLETED | OUTPATIENT
Start: 2025-03-24 | End: 2025-03-24

## 2025-03-24 RX ORDER — CEFEPIME 2 G/20ML
1000 INJECTION, POWDER, FOR SOLUTION INTRAVENOUS ONCE
Refills: 0 | Status: COMPLETED | OUTPATIENT
Start: 2025-03-24 | End: 2025-03-24

## 2025-03-24 RX ADMIN — SODIUM CHLORIDE 1000 MILLILITER(S): 9 INJECTION, SOLUTION INTRAVENOUS at 15:40

## 2025-03-24 RX ADMIN — CEFEPIME 100 MILLIGRAM(S): 2 INJECTION, POWDER, FOR SOLUTION INTRAVENOUS at 18:06

## 2025-03-24 RX ADMIN — SODIUM CHLORIDE 1000 MILLILITER(S): 9 INJECTION, SOLUTION INTRAVENOUS at 13:30

## 2025-03-24 NOTE — ED ADULT TRIAGE NOTE - CHIEF COMPLAINT QUOTE
BIBA, per EMS fall 2 weeks ago, since fall patient has not been ambulating, increased weakness, and has been non-verbal

## 2025-03-24 NOTE — ED PROVIDER NOTE - PROGRESS NOTE DETAILS
Diwght Nunes DO: Consulted neurosurgery regarding CT results.  No interventions at this time.  Recommend repeat head CT in 4 hours to evaluate progression. Dwight Nunes, DO: Patient with elevated BUN and creatinine.  Chest imaging with right pleural effusion and consolidation suspected pneumonia plus multiple  acute and subacute rib fractures. Urine with possible urinary infection. Trop 189>174.  Discussed results with patient and family.  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 Blood cultures drawn and started on abx. Patient arrived at the [facility].  No complications improved.  Trauma alert activated. Patient arrived at the [facility].  No complications improved.  Trauma alert activated. Neurosurgery aware.

## 2025-03-24 NOTE — CHART NOTE - NSCHARTNOTEFT_GEN_A_CORE
Called to evaluate HCT on a patient that fell 2 weeks ago, pt has been bedbound since then with decreased PO intake and non verbal requiring someone to care for her ADL's . HCT was read as   < from: CT Head No Cont (03.24.25 @ 14:01) >    CT HEAD:  New small patchy hyperdensity in the left thalamus since the prior CT   head from 7/5/2024 could potentially represent intraparenchymal   hemorrhage. Recommend short-term follow-up.          recommend Rpt stability HCT in 4 hours from first one, medical workup

## 2025-03-24 NOTE — CONSULT NOTE ADULT - SUBJECTIVE AND OBJECTIVE BOX
TRAUMA ACTIVATION LEVEL:  CODE / ALERT  / CONSULT  ACTIVATED BY: EMS**  /  ED**  INTUBATED: YES** / NO**      MECHANISM OF INJURY:   [] Blunt     [] MVC	  [X] Fall	  [] Pedestrian Struck	  [] Motorcycle     [] Assault     [] Bicycle collision    [] Sports injury    [] Penetrating    [] Gun Shot Wound      [] Stab Wound    GCS: 15 	E: 4	V: 5	M: 6    HPI:  89yF w/ no significant PMHx presents to the ED as a trauma alert s/p Fall three weeks ago ?HT, ?LOC, -AC. Pt daughter in law reports the patient fell three weeks ago while trying to get up from her bed to get to her walker across the room. She then crawled to the bed but was unable to get herself up. The pt lives at home alone with home nursing care who notified the daughter in law that her mother in law had fallen. Since the fall the pt has been non-verbal, decreased PO intake, and has spent more time in bed. Pt daughter in law reports home health had obtained CXR, Pelvis xray, and right forearm xray which were unremarkable. During this time the patient developed a cough and was started on amoxicillin and finished her course. Initially the Pt did not want to come to the hospital however today the pt was agreeable when her daughter in law asked if she would come to the hospital. No external signs of trauma. Pt had a Panscan done at [facility] where CT chest demonstrated Mildly displaced acute or subacute fractures of the right 2nd-7th anterior ribs and left 5th and 6th anterior ribs. Small right pleural effusion and filling defect/secretions within the right lower lobe segmental and subsegmental bronchi, with postobstructive consolidation within the right lower lobe and patchy opacities within the right upper lobe, possibly reflecting pneumonia. CT head demonstrates 0.7 cm patchy hyperdensity in the left thalamus, which may reflect intraparenchymal hemorrhage.    PAST MEDICAL & SURGICAL HISTORY:  No pertinent past medical history          Allergies    No Known Allergies    Intolerances        Home Medications:  ferrous fumarate 324 mg (106 mg elemental iron) oral tablet: 1 tab(s) orally once a day (25 Mar 2025 00:49)      ROS: 10-system review is otherwise negative except HPI above.      Primary Survey:    A - airway intact  B - bilateral breath sounds and good chest rise  C - palpable pulses in all extremities  D - GCS 15 on arrival, BANKS  Exposure obtained    Vital Signs Last 24 Hrs  T(C): 36.5 (24 Mar 2025 22:00), Max: 36.6 (24 Mar 2025 12:40)  T(F): 97.7 (24 Mar 2025 22:00), Max: 97.9 (24 Mar 2025 12:40)  HR: 100 (25 Mar 2025 01:30) (100 - 104)  BP: 119/58 (25 Mar 2025 01:30) (105/57 - 121/53)  BP(mean): --  RR: 18 (25 Mar 2025 01:30) (18 - 20)  SpO2: 98% (25 Mar 2025 01:30) (95% - 100%)    Parameters below as of 25 Mar 2025 01:30  Patient On (Oxygen Delivery Method): nasal cannula  O2 Flow (L/min): 2      Secondary Survey:   General: NAD  HEENT: Normocephalic, atraumatic, EOMI, PEERLA. no scalp lacerations   Neck: Soft, midline trachea. no c-spine tenderness  Chest: No chest wall tenderness, no subcutaneous emphysema   Cardiac: S1, S2, RRR  Respiratory: equal chest rise b/l, non labored breathing   Abdomen: Soft, non-distended, non-tender, no rebound, no guarding.  Groin: Normal appearing, pelvis stable   Ext:  Moving b/l upper and lower extremities. Palpable Radial b/l UE, b/l DP palpable in LE.   Back: No T/L/S spine tenderness, No palpable runoff/stepoff/deformity, Partial stage 2 and unstageable sacral    ACCESS / DEVICES:  [X] Peripheral IV  [ ] Central Venous Line	[ ] R	[ ] L	[ ] IJ	[ ] Fem	[ ] SC	Placed:   [ ] Arterial Line		[ ] R	[ ] L	[ ] Fem	[ ] Rad	[ ] Ax	Placed:   [ ] PICC:					[ ] Mediport  [ ] Urinary Catheter,  Date Placed:   [ ] Chest tube: [ ] Right, [ ] Left  [ ] NAI/Luis Enrique Drains    Labs:  CAPILLARY BLOOD GLUCOSE      POCT Blood Glucose.: 90 mg/dL (24 Mar 2025 21:36)                          9.7    12.98 )-----------( 541      ( 24 Mar 2025 13:32 )             30.4       Auto Immature Granulocyte %: 0.8 % (03-24-25 @ 13:32)    03-24    136  |  99  |  113[HH]  ----------------------------<  91  5.3[H]   |  19  |  3.7[H]      Calcium: 8.8 mg/dL (03-24-25 @ 13:32)      LFTs:             6.9  | 0.4  | 22       ------------------[146     ( 24 Mar 2025 13:32 )  3.2  | x    | 9           Lipase:x      Amylase:x         Blood Gas Venous - Lactate: 2.6 mmol/L (03-24-25 @ 14:54)      Coags:            Urinalysis Basic - ( 24 Mar 2025 13:32 )    Color: x / Appearance: x / SG: x / pH: x  Gluc: 91 mg/dL / Ketone: x  / Bili: x / Urobili: x   Blood: x / Protein: x / Nitrite: x   Leuk Esterase: x / RBC: x / WBC x   Sq Epi: x / Non Sq Epi: x / Bacteria: x        Urinalysis with Rflx Culture (collected 24 Mar 2025 13:02)              RADIOLOGY & ADDITIONAL STUDIES:  ---------------------------------------------------------------------------------------    ASSESSMENT:  89yF w/ no significant PMHx presents to the ED as a trauma alert s/p Fall three weeks ago ?HT, ?LOC, -AC. Pt daughter in law reports the patient fell three weeks ago while trying to get up from her bed to get to her walker across the room. She then crawled to the bed but was unable to get herself up. The pt lives at home alone with home nursing care who notified the daughter in law that her mother in law had fallen. Since the fall the pt has been non-verbal, decreased PO intake, and has spent more time in bed. Pt daughter in law reports home health had obtained CXR, Pelvis xray, and right forearm xray which were unremarkable. During this time the patient developed a cough and was started on amoxicillin and finished her course. Initially the Pt did not want to come to the hospital however today the pt was agreeable when her daughter in law asked if she would come to the hospital. No external signs of trauma. Pt had a Panscan done at [facility] where CT chest demonstrated Mildly displaced acute or subacute fractures of the right 2nd-7th anterior ribs and left 5th and 6th anterior ribs. Small right pleural effusion and filling defect/secretions within the right lower lobe segmental and subsegmental bronchi, with postobstructive consolidation within the right lower lobe and patchy opacities within the right upper lobe, possibly reflecting pneumonia. CT head demonstrates 0.7 cm patchy hyperdensity in the left thalamus, which may reflect intraparenchymal hemorrhage.    Injuries identified:   - L 5th -6th anterior rib fractures  - R 2nd-7th anterior rib fractures  - .7cm patchy hyperdensity in L thalamus    PLAN:   - No surgical intervention or further traumatic work up indicated given Sub acute nature of injuries occurring 3 weeks ago  - trauma surgery team to perform tertiary trauma surgery 3/25  - Neuro Sx recs appreciated  - recommend neurology consult   - remainder of work up and care per medicine team  - PT/Rehab evaluation      Above plan discussed with Trauma attending, Dr. Quiros  , patient, patient family, and ED teamk    Trauma/EGS x [phone]  --------------------------------------------------------------------------------------  03-24-25 @ 21:47

## 2025-03-24 NOTE — ED PROVIDER NOTE - OBJECTIVE STATEMENT
89-year-old female without routine medical care, no significant past medical history, who presents for decreased p.o. intake, nonverbal status, bedbound last 2 weeks.  Per family, patient lives home alone with home nursing care.  She typically ambulates with a walker at baseline.  Reportedly had a mechanical fall when walking towards her walker approximately 2 weeks ago.  Unknown downtime but estimated about 1 to 2 hours.  States that had home health obtain x-rays of chest, pelvis, right forearm which were reassuring.  However states that since then, patient has been nonverbal although still aware.  States that she has not been eating or drinking significantly over these past 2 weeks since the fall.  Has been mostly bedbound and requiring diaper changes.  Has been treated with amoxicillin for congestion over the past few days.  Family denies patient with any shortness of breath, chest pain, fevers, chills, nausea/vomiting, diarrhea.

## 2025-03-24 NOTE — CONSULT NOTE ADULT - SUBJECTIVE AND OBJECTIVE BOX
HPI: Patient is an 89 year-old female PMH HTN, HLD who was initially brought to [facility] ED for evaluation of AMS and poor PO intake over the last week. According to family, patient had a fall at home about two weeks ago and since then has been eating less and has been non verbal. Neurosurgery was consulted after CTH demonstrated left patchy thalamic hyperdensity concerning for IPH. Patient was seen and examined at bedside in ED. Patient is lying in bed, A&Ox2 (to person and place), and follows most commands. Daughter-in-law at bedside assisting with history. Patient able to say her name and that she is at the hospital, but otherwise only nods or shakes her head to respond to ROS questions. She denies headaches, visual changes, dizziness, or nausea at this time. Pt does not take AC/AP.        PAST MEDICAL & SURGICAL HISTORY:  No pertinent past medical history          Home Medications:      Allergies    No Known Allergies    Intolerances        ROS:  [X] A ten-point review of systems is negative except as noted   [  ] Due to altered mental status/intubation, subjective information were not able to be obtained from the patient. History was obtained, to the extent possible, from review of the chart and collateral sources of information    MEDICATIONS  (STANDING):    MEDICATIONS  (PRN):      ICU Vital Signs Last 24 Hrs  T(C): 36.5 (24 Mar 2025 22:00), Max: 36.6 (24 Mar 2025 12:40)  T(F): 97.7 (24 Mar 2025 22:00), Max: 97.9 (24 Mar 2025 12:40)  HR: 100 (24 Mar 2025 22:50) (100 - 104)  BP: 121/53 (24 Mar 2025 22:50) (105/57 - 121/53)  BP(mean): --  ABP: --  ABP(mean): --  RR: 20 (24 Mar 2025 22:50) (18 - 20)  SpO2: 100% (24 Mar 2025 22:50) (95% - 100%)    O2 Parameters below as of 24 Mar 2025 22:50  Patient On (Oxygen Delivery Method): nasal cannula  O2 Flow (L/min): 2          I&O's Detail      CBC Full  -  ( 24 Mar 2025 13:32 )  WBC Count : 12.98 K/uL  RBC Count : 3.31 M/uL  Hemoglobin : 9.7 g/dL  Hematocrit : 30.4 %  Platelet Count - Automated : 541 K/uL  Mean Cell Volume : 91.8 fL  Mean Cell Hemoglobin : 29.3 pg  Mean Cell Hemoglobin Concentration : 31.9 g/dL  Auto Neutrophil # : x  Auto Lymphocyte # : x  Auto Monocyte # : x  Auto Eosinophil # : x  Auto Basophil # : x  Auto Neutrophil % : x  Auto Lymphocyte % : x  Auto Monocyte % : x  Auto Eosinophil % : x  Auto Basophil % : x    03-24    136  |  99  |  113[HH]  ----------------------------<  91  5.3[H]   |  19  |  3.7[H]    Ca    8.8      24 Mar 2025 13:32    TPro  6.9  /  Alb  3.2[L]  /  TBili  0.4  /  DBili  x   /  AST  22  /  ALT  9   /  AlkPhos  146[H]  03-24        Urinalysis Basic - ( 24 Mar 2025 13:32 )    Color: x / Appearance: x / SG: x / pH: x  Gluc: 91 mg/dL / Ketone: x  / Bili: x / Urobili: x   Blood: x / Protein: x / Nitrite: x   Leuk Esterase: x / RBC: x / WBC x   Sq Epi: x / Non Sq Epi: x / Bacteria: x        Physical Exam:  General: Lying in bed, following commands  Head normocephalic, atraumatic  AAOX2 (to person and place). Hypophonic.  Facial motions symmetric  PERRL, EOMI  Motor: MAEx4 with good, equal strength  Sensation: intact to touch in all extremities        Imaging:  < from: CT Head No Cont (03.24.25 @ 22:37) >  IMPRESSION:    Since CT head 3/24/2025 at 1:57 PM:    Redemonstrated 0.7 cm patchy hyperdensity in the left thalamus, which may   reflect intraparenchymal hemorrhage. There has been no increase in the   area of hyperdensity.          Assessment/Plan:  89 year-old female PMH HTN, HLD p/w AMS and decreased PO intake found to have left thalamic hyperdensity concerning for IPH.   -CTA H&N  -Neurology consult  -F/u trauma reccs  -BP mgmt, SBP <140  -HOB elevated  -No neurosurgical intervention  -Pt may follow up in office with Dr. Shetty upon discharge  -D/w attending HPI: Patient is an 89 year-old female PMH HTN, HLD who was initially brought to [facility] ED for evaluation of AMS and poor PO intake over the last week. According to family, patient had a fall at home about two weeks ago and since then has been eating less and has been non verbal. Neurosurgery was consulted after CTH demonstrated left patchy thalamic hyperdensity concerning for IPH. Patient was seen and examined at bedside in ED. Patient is lying in bed, A&Ox2 (to person and place), and follows most commands. Daughter-in-law at bedside assisting with history. Patient able to say her name and that she is at the hospital, but otherwise only nods or shakes her head to respond to ROS questions. She denies headaches, visual changes, dizziness, or nausea at this time. Pt does not take AC/AP.        PAST MEDICAL & SURGICAL HISTORY:  No pertinent past medical history          Home Medications:      Allergies    No Known Allergies    Intolerances        ROS:  [X] A ten-point review of systems is negative except as noted   [  ] Due to altered mental status/intubation, subjective information were not able to be obtained from the patient. History was obtained, to the extent possible, from review of the chart and collateral sources of information    MEDICATIONS  (STANDING):    MEDICATIONS  (PRN):      ICU Vital Signs Last 24 Hrs  T(C): 36.5 (24 Mar 2025 22:00), Max: 36.6 (24 Mar 2025 12:40)  T(F): 97.7 (24 Mar 2025 22:00), Max: 97.9 (24 Mar 2025 12:40)  HR: 100 (24 Mar 2025 22:50) (100 - 104)  BP: 121/53 (24 Mar 2025 22:50) (105/57 - 121/53)  BP(mean): --  ABP: --  ABP(mean): --  RR: 20 (24 Mar 2025 22:50) (18 - 20)  SpO2: 100% (24 Mar 2025 22:50) (95% - 100%)    O2 Parameters below as of 24 Mar 2025 22:50  Patient On (Oxygen Delivery Method): nasal cannula  O2 Flow (L/min): 2          I&O's Detail      CBC Full  -  ( 24 Mar 2025 13:32 )  WBC Count : 12.98 K/uL  RBC Count : 3.31 M/uL  Hemoglobin : 9.7 g/dL  Hematocrit : 30.4 %  Platelet Count - Automated : 541 K/uL  Mean Cell Volume : 91.8 fL  Mean Cell Hemoglobin : 29.3 pg  Mean Cell Hemoglobin Concentration : 31.9 g/dL  Auto Neutrophil # : x  Auto Lymphocyte # : x  Auto Monocyte # : x  Auto Eosinophil # : x  Auto Basophil # : x  Auto Neutrophil % : x  Auto Lymphocyte % : x  Auto Monocyte % : x  Auto Eosinophil % : x  Auto Basophil % : x    03-24    136  |  99  |  113[HH]  ----------------------------<  91  5.3[H]   |  19  |  3.7[H]    Ca    8.8      24 Mar 2025 13:32    TPro  6.9  /  Alb  3.2[L]  /  TBili  0.4  /  DBili  x   /  AST  22  /  ALT  9   /  AlkPhos  146[H]  03-24        Urinalysis Basic - ( 24 Mar 2025 13:32 )    Color: x / Appearance: x / SG: x / pH: x  Gluc: 91 mg/dL / Ketone: x  / Bili: x / Urobili: x   Blood: x / Protein: x / Nitrite: x   Leuk Esterase: x / RBC: x / WBC x   Sq Epi: x / Non Sq Epi: x / Bacteria: x        Physical Exam:  General: Lying in bed, following commands  Head normocephalic, atraumatic  AAOX2 (to person and place). Hypophonic.  Facial motions symmetric  PERRL, EOMI  Motor: MAEx4 with good, equal strength  Sensation: intact to touch in all extremities        Imaging:  < from: CT Head No Cont (03.24.25 @ 22:37) >  IMPRESSION:    Since CT head 3/24/2025 at 1:57 PM:    Redemonstrated 0.7 cm patchy hyperdensity in the left thalamus, which may   reflect intraparenchymal hemorrhage. There has been no increase in the   area of hyperdensity.          Assessment/Plan:  89 year-old female PMH HTN, HLD p/w AMS and decreased PO intake found to have left thalamic hyperdensity concerning for IPH.   -Repeat CTH stable  -CTA H&N  -Neurology consult  -F/u trauma reccs  -BP mgmt, SBP <140  -HOB elevated  -No neurosurgical intervention  -Pt may follow up in office with Dr. Shetty upon discharge  -D/w attending

## 2025-03-24 NOTE — ED PROVIDER NOTE - CARE PLAN
1 Principal Discharge DX:	Metabolic encephalopathy  Secondary Diagnosis:	Acute renal failure  Secondary Diagnosis:	Fall  Secondary Diagnosis:	Fracture of multiple ribs of right side  Secondary Diagnosis:	Fracture of multiple ribs of left side   Principal Discharge DX:	Metabolic encephalopathy  Secondary Diagnosis:	Acute renal failure  Secondary Diagnosis:	Fall  Secondary Diagnosis:	Fracture of multiple ribs of right side  Secondary Diagnosis:	Fracture of multiple ribs of left side  Secondary Diagnosis:	Pleural effusion, right

## 2025-03-24 NOTE — ED PROVIDER NOTE - ATTENDING CONTRIBUTION TO CARE
89-year-old female brought in for evaluation, lives alone checked in on by family, had a fall 3 weeks ago and since then has not been speaking or eating or drinking, daughter-in-law finally convinced rest of family to bring patient to the emergency room, nothing is changed over the last few days, on exam vitals appreciated, elderly and frail but nontoxic, is awake alert and attends and will lag her finger at us but is not communicative, head NC/AT, PERRLA, dry mucous membranes, neck supple, cor tachycardic and regular, lungs with diminished breath sounds right base, no marks of trauma, abdomen soft nontender, pelvis stable, full range of motion x 4, neurologically is nonfocal, labs and studies appreciated, given multiple rib fractures with read of possible acute component will transfer [place] for trauma evaluation; CT head read appreciated, neurosurgery consulted who recommended delayed CT, this recommendation passed on to SHERE[facility] Jones team, family consents to transfer, patient stable for transfer, Dr. Mabel Jones aware

## 2025-03-24 NOTE — ED PROVIDER NOTE - PHYSICAL EXAMINATION
Initial vital signs reviewed.  General: NAD, nontoxic appearing.  HENT: AT/NC. dry mucous membrane.  Eyes: non-injected conjunctivae b/l.  Neck: supple.  CV: mild tachy, regular rate, no murmurs. 2+ distal pulses x4.  Pulm: nonlabored work of breathing, CTAB.  Abd: soft, nondistended, epigastric ttp  MSK: no joint deformity.  Skin: warm, dry, well-perfused.  Neuro: A&Ox4. nonverbal but follows commands. moves all ext.  Psych: appropriate mood and affect.

## 2025-03-24 NOTE — ED PROVIDER NOTE - CLINICAL SUMMARY MEDICAL DECISION MAKING FREE TEXT BOX
No adenopathy or splenomegaly. No cervical or inguinal lymphadenopathy. Presents to the [facility] ED after a 2-week history of possible fall and in no longer speaking as a few weeks ago.  Following some commands.  Noted labs and imaging done at [facility] ED found to have multiple rib fractures and possible thalamic hemorrhage.  Transferred to the [place] ED.  Trauma activated upon arrival.  By trauma neurosurgical team.  Neurosurgery requested repeat CT head which was done and stable.  Cleared by neurosurgery and trauma team.  Patient admitted to medicine for further management. Labs were ordered and reviewed.  Imaging was ordered and reviewed by me.  Appropriate medications for patient's presenting complaints were ordered and effects were reassessed.  Patient's records (prior hospital, ED visit, and/or nursing home notes if available) were reviewed.  Additional history was obtained from EMS, family, and/or PCP (where available).  Escalation to admission/observation was considered.  Patient requires inpatient hospitalization - monitored setting.

## 2025-03-24 NOTE — ED PROVIDER NOTE - SECONDARY DIAGNOSIS.
Acute renal failure Fracture of multiple ribs of left side Fracture of multiple ribs of right side Fall Pleural effusion, right

## 2025-03-25 DIAGNOSIS — G93.41 METABOLIC ENCEPHALOPATHY: ICD-10-CM

## 2025-03-25 LAB
ALBUMIN SERPL ELPH-MCNC: 2.7 G/DL — LOW (ref 3.5–5.2)
ALP SERPL-CCNC: 120 U/L — HIGH (ref 30–115)
ALT FLD-CCNC: 7 U/L — SIGNIFICANT CHANGE UP (ref 0–41)
ANION GAP SERPL CALC-SCNC: 15 MMOL/L — HIGH (ref 7–14)
APPEARANCE UR: ABNORMAL
AST SERPL-CCNC: 16 U/L — SIGNIFICANT CHANGE UP (ref 0–41)
BASOPHILS # BLD AUTO: 0.05 K/UL — SIGNIFICANT CHANGE UP (ref 0–0.2)
BASOPHILS NFR BLD AUTO: 0.4 % — SIGNIFICANT CHANGE UP (ref 0–1)
BILIRUB SERPL-MCNC: 0.3 MG/DL — SIGNIFICANT CHANGE UP (ref 0.2–1.2)
BILIRUB UR-MCNC: NEGATIVE — SIGNIFICANT CHANGE UP
BUN SERPL-MCNC: 102 MG/DL — CRITICAL HIGH (ref 10–20)
CALCIUM SERPL-MCNC: 8.2 MG/DL — LOW (ref 8.4–10.5)
CHLORIDE SERPL-SCNC: 102 MMOL/L — SIGNIFICANT CHANGE UP (ref 98–110)
CO2 SERPL-SCNC: 19 MMOL/L — SIGNIFICANT CHANGE UP (ref 17–32)
COLOR SPEC: YELLOW — SIGNIFICANT CHANGE UP
CREAT ?TM UR-MCNC: 70 MG/DL — SIGNIFICANT CHANGE UP
CREAT SERPL-MCNC: 3.4 MG/DL — HIGH (ref 0.7–1.5)
DIFF PNL FLD: NEGATIVE — SIGNIFICANT CHANGE UP
EGFR: 12 ML/MIN/1.73M2 — LOW
EGFR: 12 ML/MIN/1.73M2 — LOW
EOSINOPHIL # BLD AUTO: 0.02 K/UL — SIGNIFICANT CHANGE UP (ref 0–0.7)
EOSINOPHIL NFR BLD AUTO: 0.2 % — SIGNIFICANT CHANGE UP (ref 0–8)
GLUCOSE SERPL-MCNC: 114 MG/DL — HIGH (ref 70–99)
GLUCOSE UR QL: NEGATIVE MG/DL — SIGNIFICANT CHANGE UP
HCT VFR BLD CALC: 25 % — LOW (ref 37–47)
HGB BLD-MCNC: 7.9 G/DL — LOW (ref 12–16)
IMM GRANULOCYTES NFR BLD AUTO: 1 % — HIGH (ref 0.1–0.3)
KETONES UR-MCNC: ABNORMAL MG/DL
LEUKOCYTE ESTERASE UR-ACNC: NEGATIVE — SIGNIFICANT CHANGE UP
LYMPHOCYTES # BLD AUTO: 0.62 K/UL — LOW (ref 1.2–3.4)
LYMPHOCYTES # BLD AUTO: 5 % — LOW (ref 20.5–51.1)
MAGNESIUM SERPL-MCNC: 2.5 MG/DL — HIGH (ref 1.8–2.4)
MCHC RBC-ENTMCNC: 29.8 PG — SIGNIFICANT CHANGE UP (ref 27–31)
MCHC RBC-ENTMCNC: 31.6 G/DL — LOW (ref 32–37)
MCV RBC AUTO: 94.3 FL — SIGNIFICANT CHANGE UP (ref 81–99)
MONOCYTES # BLD AUTO: 0.91 K/UL — HIGH (ref 0.1–0.6)
MONOCYTES NFR BLD AUTO: 7.4 % — SIGNIFICANT CHANGE UP (ref 1.7–9.3)
NEUTROPHILS # BLD AUTO: 10.65 K/UL — HIGH (ref 1.4–6.5)
NEUTROPHILS NFR BLD AUTO: 86 % — HIGH (ref 42.2–75.2)
NITRITE UR-MCNC: NEGATIVE — SIGNIFICANT CHANGE UP
NRBC BLD AUTO-RTO: 0 /100 WBCS — SIGNIFICANT CHANGE UP (ref 0–0)
OSMOLALITY UR: 585 MOS/KG — SIGNIFICANT CHANGE UP (ref 50–1200)
PH UR: 5.5 — SIGNIFICANT CHANGE UP (ref 5–8)
PHOSPHATE SERPL-MCNC: 5.4 MG/DL — HIGH (ref 2.1–4.9)
PLATELET # BLD AUTO: 413 K/UL — HIGH (ref 130–400)
PMV BLD: 9.2 FL — SIGNIFICANT CHANGE UP (ref 7.4–10.4)
POTASSIUM SERPL-MCNC: 4.8 MMOL/L — SIGNIFICANT CHANGE UP (ref 3.5–5)
POTASSIUM SERPL-SCNC: 4.8 MMOL/L — SIGNIFICANT CHANGE UP (ref 3.5–5)
POTASSIUM UR-SCNC: 38 MMOL/L — SIGNIFICANT CHANGE UP
PROCALCITONIN SERPL-MCNC: 0.48 NG/ML — HIGH (ref 0.02–0.1)
PROT ?TM UR-MCNC: 45 MG/DL — SIGNIFICANT CHANGE UP
PROT SERPL-MCNC: 5.9 G/DL — LOW (ref 6–8)
PROT UR-MCNC: 30 MG/DL
PROT/CREAT UR-RTO: 0.6 RATIO — HIGH (ref 0–0.2)
RBC # BLD: 2.65 M/UL — LOW (ref 4.2–5.4)
RBC # FLD: 15.4 % — HIGH (ref 11.5–14.5)
SODIUM SERPL-SCNC: 136 MMOL/L — SIGNIFICANT CHANGE UP (ref 135–146)
SODIUM UR-SCNC: 70 MMOL/L — SIGNIFICANT CHANGE UP
SP GR SPEC: 1.02 — SIGNIFICANT CHANGE UP (ref 1–1.03)
UROBILINOGEN FLD QL: 0.2 MG/DL — SIGNIFICANT CHANGE UP (ref 0.2–1)
UUN UR-MCNC: 1005 MG/DL — SIGNIFICANT CHANGE UP
WBC # BLD: 12.37 K/UL — HIGH (ref 4.8–10.8)
WBC # FLD AUTO: 12.37 K/UL — HIGH (ref 4.8–10.8)

## 2025-03-25 PROCEDURE — 80053 COMPREHEN METABOLIC PANEL: CPT

## 2025-03-25 PROCEDURE — 83735 ASSAY OF MAGNESIUM: CPT

## 2025-03-25 PROCEDURE — 84133 ASSAY OF URINE POTASSIUM: CPT

## 2025-03-25 PROCEDURE — 85025 COMPLETE CBC W/AUTO DIFF WBC: CPT

## 2025-03-25 PROCEDURE — 80048 BASIC METABOLIC PNL TOTAL CA: CPT

## 2025-03-25 PROCEDURE — 71045 X-RAY EXAM CHEST 1 VIEW: CPT | Mod: 26

## 2025-03-25 PROCEDURE — 84100 ASSAY OF PHOSPHORUS: CPT

## 2025-03-25 PROCEDURE — 83935 ASSAY OF URINE OSMOLALITY: CPT

## 2025-03-25 PROCEDURE — 86900 BLOOD TYPING SEROLOGIC ABO: CPT

## 2025-03-25 PROCEDURE — 97162 PT EVAL MOD COMPLEX 30 MIN: CPT | Mod: GP

## 2025-03-25 PROCEDURE — 84443 ASSAY THYROID STIM HORMONE: CPT

## 2025-03-25 PROCEDURE — 81001 URINALYSIS AUTO W/SCOPE: CPT

## 2025-03-25 PROCEDURE — 92611 MOTION FLUOROSCOPY/SWALLOW: CPT | Mod: GN

## 2025-03-25 PROCEDURE — 87899 AGENT NOS ASSAY W/OPTIC: CPT

## 2025-03-25 PROCEDURE — 99222 1ST HOSP IP/OBS MODERATE 55: CPT

## 2025-03-25 PROCEDURE — 97110 THERAPEUTIC EXERCISES: CPT | Mod: GP

## 2025-03-25 PROCEDURE — 84145 PROCALCITONIN (PCT): CPT

## 2025-03-25 PROCEDURE — 82570 ASSAY OF URINE CREATININE: CPT

## 2025-03-25 PROCEDURE — 84156 ASSAY OF PROTEIN URINE: CPT

## 2025-03-25 PROCEDURE — A9579: CPT

## 2025-03-25 PROCEDURE — 97530 THERAPEUTIC ACTIVITIES: CPT | Mod: GP

## 2025-03-25 PROCEDURE — 85027 COMPLETE CBC AUTOMATED: CPT

## 2025-03-25 PROCEDURE — 86901 BLOOD TYPING SEROLOGIC RH(D): CPT

## 2025-03-25 PROCEDURE — 95819 EEG AWAKE AND ASLEEP: CPT

## 2025-03-25 PROCEDURE — 92610 EVALUATE SWALLOWING FUNCTION: CPT | Mod: GN

## 2025-03-25 PROCEDURE — 99232 SBSQ HOSP IP/OBS MODERATE 35: CPT

## 2025-03-25 PROCEDURE — 36415 COLL VENOUS BLD VENIPUNCTURE: CPT

## 2025-03-25 PROCEDURE — 70551 MRI BRAIN STEM W/O DYE: CPT | Mod: MC

## 2025-03-25 PROCEDURE — 74230 X-RAY XM SWLNG FUNCJ C+: CPT

## 2025-03-25 PROCEDURE — 71045 X-RAY EXAM CHEST 1 VIEW: CPT

## 2025-03-25 PROCEDURE — 87449 NOS EACH ORGANISM AG IA: CPT

## 2025-03-25 PROCEDURE — 84300 ASSAY OF URINE SODIUM: CPT

## 2025-03-25 PROCEDURE — 92526 ORAL FUNCTION THERAPY: CPT | Mod: GN

## 2025-03-25 PROCEDURE — 84540 ASSAY OF URINE/UREA-N: CPT

## 2025-03-25 PROCEDURE — 86850 RBC ANTIBODY SCREEN: CPT

## 2025-03-25 PROCEDURE — 82962 GLUCOSE BLOOD TEST: CPT

## 2025-03-25 RX ORDER — CEFTRIAXONE 500 MG/1
INJECTION, POWDER, FOR SOLUTION INTRAMUSCULAR; INTRAVENOUS
Refills: 0 | Status: COMPLETED | OUTPATIENT
Start: 2025-03-25 | End: 2025-03-31

## 2025-03-25 RX ORDER — ATORVASTATIN CALCIUM 80 MG/1
40 TABLET, FILM COATED ORAL AT BEDTIME
Refills: 0 | Status: DISCONTINUED | OUTPATIENT
Start: 2025-03-25 | End: 2025-04-03

## 2025-03-25 RX ORDER — SODIUM CHLORIDE 9 G/1000ML
1000 INJECTION, SOLUTION INTRAVENOUS
Refills: 0 | Status: DISCONTINUED | OUTPATIENT
Start: 2025-03-25 | End: 2025-03-27

## 2025-03-25 RX ORDER — CEFTRIAXONE 500 MG/1
1000 INJECTION, POWDER, FOR SOLUTION INTRAMUSCULAR; INTRAVENOUS EVERY 24 HOURS
Refills: 0 | Status: COMPLETED | OUTPATIENT
Start: 2025-03-26 | End: 2025-03-30

## 2025-03-25 RX ORDER — MAGNESIUM, ALUMINUM HYDROXIDE 200-200 MG
30 TABLET,CHEWABLE ORAL EVERY 4 HOURS
Refills: 0 | Status: DISCONTINUED | OUTPATIENT
Start: 2025-03-25 | End: 2025-04-01

## 2025-03-25 RX ORDER — AMLODIPINE BESYLATE 10 MG/1
2.5 TABLET ORAL DAILY
Refills: 0 | Status: DISCONTINUED | OUTPATIENT
Start: 2025-03-25 | End: 2025-03-29

## 2025-03-25 RX ORDER — AZITHROMYCIN 250 MG
500 CAPSULE ORAL ONCE
Refills: 0 | Status: COMPLETED | OUTPATIENT
Start: 2025-03-25 | End: 2025-03-25

## 2025-03-25 RX ORDER — AZITHROMYCIN 250 MG
CAPSULE ORAL
Refills: 0 | Status: DISCONTINUED | OUTPATIENT
Start: 2025-03-25 | End: 2025-04-01

## 2025-03-25 RX ORDER — MELATONIN 5 MG
3 TABLET ORAL AT BEDTIME
Refills: 0 | Status: DISCONTINUED | OUTPATIENT
Start: 2025-03-25 | End: 2025-04-03

## 2025-03-25 RX ORDER — CEFTRIAXONE 500 MG/1
1000 INJECTION, POWDER, FOR SOLUTION INTRAMUSCULAR; INTRAVENOUS ONCE
Refills: 0 | Status: COMPLETED | OUTPATIENT
Start: 2025-03-25 | End: 2025-03-25

## 2025-03-25 RX ORDER — AZITHROMYCIN 250 MG
500 CAPSULE ORAL EVERY 24 HOURS
Refills: 0 | Status: DISCONTINUED | OUTPATIENT
Start: 2025-03-26 | End: 2025-04-01

## 2025-03-25 RX ORDER — FERROUS FUMARATE 324(106)MG
1 TABLET ORAL
Refills: 0 | DISCHARGE

## 2025-03-25 RX ORDER — SODIUM CHLORIDE 9 G/1000ML
250 INJECTION, SOLUTION INTRAVENOUS ONCE
Refills: 0 | Status: COMPLETED | OUTPATIENT
Start: 2025-03-25 | End: 2025-03-25

## 2025-03-25 RX ORDER — BISACODYL 5 MG
10 TABLET, DELAYED RELEASE (ENTERIC COATED) ORAL DAILY
Refills: 0 | Status: DISCONTINUED | OUTPATIENT
Start: 2025-03-25 | End: 2025-04-03

## 2025-03-25 RX ORDER — ACETAMINOPHEN 500 MG/5ML
650 LIQUID (ML) ORAL EVERY 6 HOURS
Refills: 0 | Status: DISCONTINUED | OUTPATIENT
Start: 2025-03-25 | End: 2025-04-03

## 2025-03-25 RX ADMIN — SODIUM CHLORIDE 1000 MILLILITER(S): 9 INJECTION, SOLUTION INTRAVENOUS at 10:06

## 2025-03-25 RX ADMIN — CEFTRIAXONE 100 MILLIGRAM(S): 500 INJECTION, POWDER, FOR SOLUTION INTRAMUSCULAR; INTRAVENOUS at 01:35

## 2025-03-25 RX ADMIN — SODIUM CHLORIDE 60 MILLILITER(S): 9 INJECTION, SOLUTION INTRAVENOUS at 21:15

## 2025-03-25 RX ADMIN — SODIUM CHLORIDE 60 MILLILITER(S): 9 INJECTION, SOLUTION INTRAVENOUS at 10:06

## 2025-03-25 RX ADMIN — Medication 250 MILLIGRAM(S): at 03:28

## 2025-03-25 RX ADMIN — Medication 1 APPLICATION(S): at 21:18

## 2025-03-25 RX ADMIN — ATORVASTATIN CALCIUM 40 MILLIGRAM(S): 80 TABLET, FILM COATED ORAL at 21:16

## 2025-03-25 NOTE — PROGRESS NOTE ADULT - SUBJECTIVE AND OBJECTIVE BOX
SUBJECTIVE/OVERNIGHT EVENTS  Today is hospital day . This morning patient was seen and examined at bedside, resting comfortably in bed. No acute or major events overnight. Patient is nonverbal but makes eye contact and follows commands. Patient has equal strength in hands bilaterally. Patient does not localize pain.       FAMILY COMMUNICATION  Contact date:  Name of person contacted:  Relationship to patient:  Communication details:    MEDICATIONS  STANDING MEDICATIONS  amLODIPine   Tablet 2.5 milliGRAM(s) Oral daily  atorvastatin 40 milliGRAM(s) Oral at bedtime  azithromycin  IVPB      cefTRIAXone   IVPB      lactated ringers Bolus 250 milliLiter(s) IV Bolus once  lactated ringers. 1000 milliLiter(s) IV Continuous <Continuous>  pantoprazole    Tablet 40 milliGRAM(s) Oral before breakfast    PRN MEDICATIONS  acetaminophen     Tablet .. 650 milliGRAM(s) Oral every 6 hours PRN  aluminum hydroxide/magnesium hydroxide/simethicone Suspension 30 milliLiter(s) Oral every 4 hours PRN  bisacodyl Suppository 10 milliGRAM(s) Rectal daily PRN  melatonin 3 milliGRAM(s) Oral at bedtime PRN    VITALS  T(F): 97.6 (03-25-25 @ 04:44), Max: 97.9 (03-24-25 @ 12:40)  HR: 101 (03-25-25 @ 04:44) (100 - 104)  BP: 105/60 (03-25-25 @ 04:44) (105/57 - 121/53)  RR: 19 (03-25-25 @ 02:41) (18 - 20)  SpO2: 99% (03-25-25 @ 04:44) (92% - 100%)  POCT Blood Glucose.: 90 mg/dL (03-24-25 @ 21:36)    PHYSICAL EXAM  GENERAL  ( x ) NAD, lying in bed comfortably     (  ) obtunded     (  ) lethargic     (  ) somnolent    HEAD  ( x ) Atraumatic     (  ) hematoma     (  ) laceration (specify location:       )     NECK  (x  ) Supple     (  ) neck stiffness     (  ) nuchal rigidity     (  )  no JVD     (  ) JVD present ( -- cm)    HEART  Rate -->  (x  ) normal rate    (  ) bradycardic    (  ) tachycardic  Rhythm -->  (x  ) regular    (  ) regularly irregular    (  ) irregularly irregular  Murmurs -->  (x ) normal s1/s2    (  ) systolic murmur    (  ) diastolic murmur    (  ) continuous murmur     (  ) S3 present    (  ) S4 present    LUNGS  (x  )Unlabored respirations     (  ) tachypnea  ( x ) B/L air entry     (  ) decreased breath sounds in:  (location     )    (  ) no adventitious sound     (  ) crackles     (  ) wheezing      (  ) rhonchi      (specify location:       )  (  ) chest wall tenderness (specify location:       )    ABDOMEN  (x  ) Soft     (  ) tense   |   (  ) nondistended     (  ) distended   |   (  ) +BS     (  ) hypoactive bowel sounds     (  ) hyperactive bowel sounds  (x  ) nontender     (  ) RUQ tenderness     (  ) RLQ tenderness     (  ) LLQ tenderness     (  ) epigastric tenderness     (  ) diffuse tenderness  (  ) Splenomegaly      (  ) Hepatomegaly      (  ) Jaundice     (  ) ecchymosis     EXTREMITIES  (  ) Normal     (  ) Rash     (  ) ecchymosis     (  ) varicose veins      (  ) pitting edema     (  ) non-pitting edema   (  ) ulceration     (  ) gangrene:     (location:     )    NERVOUS SYSTEM  (  ) A&Ox3     (  ) confused     (  ) lethargic  CN II-XII:     (  ) Intact     (  ) focal deficits  (Specify:     )   Upper extremities:     (  ) strength X/5     (  ) focal deficit (specify:    )  Lower extremities:     (  ) strength  X/5    (  ) focal deficit (specify:    )    SKIN  (  ) No rashes or lesions     (  ) maculopapular rash     (  ) pustules     (  ) vesicles     (  ) ulcer     (  ) ecchymosis     (specify location:     )    (  ) Indwelling Desai Catheter   Date insterted:    Reason (  ) Critical illness     (  ) urinary retention    (  ) Accurate Ins/Outs Monitoring     (  ) CMO patient    (  ) Central Line  Date inserted:  Location: (  ) Right IJ   (  ) Left IJ   (  ) Right Fem   (  ) Left Fem    (  ) SPC  (  ) pigtail  (  ) PEG tube  (  ) colostomy  (  ) jejunostomy  (  ) U-Dall    LABS             7.9    12.37 )-----------( 413      ( 03-25-25 @ 05:34 )             25.0     136  |  102  |  102  -------------------------<  114   03-25-25 @ 05:34  4.8  |  19  |  3.4    Ca      8.2     03-25-25 @ 05:34  Phos   5.4     03-25-25 @ 05:34  Mg     2.5     03-25-25 @ 05:34    TPro  5.9  /  Alb  2.7  /  TBili  0.3  /  DBili  x   /  AST  16  /  ALT  7   /  AlkPhos  120  /  GGT  x     03-25-25 @ 05:34      Troponin T, High Sensitivity Result: 174 ng/L (03-24-25 @ 16:07)  Troponin T, High Sensitivity Result: 189 ng/L (03-24-25 @ 14:35)    Urinalysis Basic - ( 25 Mar 2025 05:34 )    Color: x / Appearance: x / SG: x / pH: x  Gluc: 114 mg/dL / Ketone: x  / Bili: x / Urobili: x   Blood: x / Protein: x / Nitrite: x   Leuk Esterase: x / RBC: x / WBC x   Sq Epi: x / Non Sq Epi: x / Bacteria: x          Urinalysis with Rflx Culture (collected 24 Mar 2025 13:02)      IMAGING

## 2025-03-25 NOTE — CONSULT NOTE ADULT - SUBJECTIVE AND OBJECTIVE BOX
***STROKE CONSULT NOTE***    HPI:  89F, Vietnamese-speaking, PMH HTN, presents with decreased oral intake, nonverbal, and bedbound 2 weeks. Neurology consulted for CTH finding of left patchy thalamic hyperdensity concerning for IPH. Per chart review, patient had a mechanical fall when walking towards her walker approximately 2 weeks ago, with reassuring X-rays. Since then, patient has been nonverbal although still aware, eating less, and bedbound. At baseline, she ambulates with a walker and lives at home alone with home care. Neurosurgery has evaluated patient, no intervention. Patient is able to say her name, nod or shake head to questions, and follows simple commands. She denies headache, dizziness, neck pain, weakness, or vision change.       PAST MEDICAL & SURGICAL HISTORY:  No pertinent past medical history      FAMILY HISTORY:    Social History: (-) x 3    Allergies  No Known Allergies    Intolerances      MEDICATIONS  (STANDING):  amLODIPine   Tablet 2.5 milliGRAM(s) Oral daily  atorvastatin 40 milliGRAM(s) Oral at bedtime  azithromycin  IVPB      cefTRIAXone   IVPB      pantoprazole    Tablet 40 milliGRAM(s) Oral before breakfast    MEDICATIONS  (PRN):  acetaminophen     Tablet .. 650 milliGRAM(s) Oral every 6 hours PRN Temp greater or equal to 38C (100.4F), Mild Pain (1 - 3)  aluminum hydroxide/magnesium hydroxide/simethicone Suspension 30 milliLiter(s) Oral every 4 hours PRN Dyspepsia  bisacodyl Suppository 10 milliGRAM(s) Rectal daily PRN Constipation  melatonin 3 milliGRAM(s) Oral at bedtime PRN Insomnia      Vital Signs Last 24 Hrs  T(C): 35.9 (25 Mar 2025 02:41), Max: 36.6 (24 Mar 2025 12:40)  T(F): 96.6 (25 Mar 2025 02:41), Max: 97.9 (24 Mar 2025 12:40)  HR: 102 (25 Mar 2025 02:41) (100 - 104)  BP: 117/65 (25 Mar 2025 02:41) (105/57 - 121/53)  BP(mean): --  RR: 19 (25 Mar 2025 02:41) (18 - 20)  SpO2: 92% (25 Mar 2025 02:41) (92% - 100%)    Parameters below as of 25 Mar 2025 02:41  Patient On (Oxygen Delivery Method): nasal cannula    Weight (kg): 46.2 (03-24-25 @ 17:28)      Neurologic Examination:  General: Appearance is consistent with chronologic age.  No abnormal facies.   General: The patient is oriented to person, place, not to time or age. Only says her name. Follows 1-step commands  Cranial nerves: intact VA, VFF.  EOMI w/o nystagmus, skew or reported double vision.  PERRL.  No ptosis/weakness of eyelid closure.  Facial sensation is normal with normal bite.  No facial asymmetry.  Hearing grossly intact b/l.  Palate elevates midline.  Tongue midline.  Motor examination: Moves all extremities to light touch. Normal tone, bulk and range of motion.  No tenderness, twitching, tremors or involuntary movements.  Reflexes: 2+ b/l pectoralis, biceps, triceps, brachioradialis, patella and Achilles. Plantar response downgoing b/l. Jaw jerk, Mina, clonus absent.  Sensory examination: limited assessment, patient moves all limbs to touch and withdraws to vibration/coldness.  Cerebellum: unable to assess FTN or HTS, patient not able to follow complicated command.     NIHSS: 6 (LOC 2, task 1, mute 3)      Labs:   CBC Full  -  ( 24 Mar 2025 13:32 )  WBC Count : 12.98 K/uL  RBC Count : 3.31 M/uL  Hemoglobin : 9.7 g/dL  Hematocrit : 30.4 %  Platelet Count - Automated : 541 K/uL  Mean Cell Volume : 91.8 fL  Mean Cell Hemoglobin : 29.3 pg  Mean Cell Hemoglobin Concentration : 31.9 g/dL  Auto Neutrophil # : x  Auto Lymphocyte # : x  Auto Monocyte # : x  Auto Eosinophil # : x  Auto Basophil # : x  Auto Neutrophil % : x  Auto Lymphocyte % : x  Auto Monocyte % : x  Auto Eosinophil % : x  Auto Basophil % : x    03-24    136  |  99  |  113[HH]  ----------------------------<  91  5.3[H]   |  19  |  3.7[H]    Ca    8.8      24 Mar 2025 13:32    TPro  6.9  /  Alb  3.2[L]  /  TBili  0.4  /  DBili  x   /  AST  22  /  ALT  9   /  AlkPhos  146[H]  03-24    LIVER FUNCTIONS - ( 24 Mar 2025 13:32 )  Alb: 3.2 g/dL / Pro: 6.9 g/dL / ALK PHOS: 146 U/L / ALT: 9 U/L / AST: 22 U/L / GGT: x             Urinalysis Basic - ( 24 Mar 2025 13:32 )    Color: x / Appearance: x / SG: x / pH: x  Gluc: 91 mg/dL / Ketone: x  / Bili: x / Urobili: x   Blood: x / Protein: x / Nitrite: x   Leuk Esterase: x / RBC: x / WBC x   Sq Epi: x / Non Sq Epi: x / Bacteria: x      POCT Blood Glucose.: 90 mg/dL (03-24-25 @ 21:36)      Neuroimaging:  NCHCT: CT Head No Cont:   ACC: [id number] EXAM:  CT BRAIN   ORDERED BY: JANNET CHRISTIAN     PROCEDURE DATE:  03/24/2025          INTERPRETATION:  CLINICAL INDICATION: Altered mental status, follow-up   possible intraparenchymal hemorrhage in the left thalamus    TECHNIQUE: CT of the head was performed without the administration of   intravenous contrast. Coronal and sagittal reformats were obtained.    COMPARISON: CT head 3/24/2025 at 1:57 PM    FINDINGS:    Redemonstrated 0.7 cm patchy hyperdensity in the left thalamus, which may   reflect intraparenchymal hemorrhage. There is no mass effect or midline   shift.    Stable prominence of the sulci, sylvian fissures, and ventricles. This   reflects stable moderate diffuse parenchymal volume loss.    There are stable confluent low attenuations in the periventricular   cerebral white matter, reflecting stable severe chronic microvascular   ischemic changes.    No evidence of hydrocephalus. No extra-axial fluid collections.    The visualized intraorbital contents are unremarkable. Redemonstrated   near complete opacification of the left sphenoid sinus. The mastoid air   cells are aerated.    No acute depressed calvarial fracture.    IMPRESSION:    Since CT head 3/24/2025 at 1:57 PM:    Redemonstrated 0.7 cm patchy hyperdensity in the left thalamus, which may   reflect intraparenchymal hemorrhage. There has been no increase in the   area of hyperdensity.    --- End of Report ---          MICHAEL MACIEL MD; Resident Radiologist  This document has been electronically signed.  CARMEL FERGUSON MD; Attending Interventional Radiologist  This document has been electronically signed. Mar 24 2025 10:59PM (03-24-25 @ 22:37)  CT Head No Cont:   ACC: [id number] EXAM:  CT CERVICAL SPINE   ORDERED BY: JACKELYN NUNES     ACC: [id number] EXAM:  CT BRAIN   ORDERED BY: JACKELYN NUNES     PROCEDURE DATE:  03/24/2025          INTERPRETATION:  CLINICAL INDICATION: Altered mental status.    TECHNIQUE: CT of thehead and cervical spine was performed without the   administration of intravenous contrast.    COMPARISON: CT head and C-spine dated 7/5/2024. Correlated with MR brain   dated 7/7/2024.    FINDINGS:    CT HEAD:    There is new 0.7 cm patchy hyperdensity in the left thalamus since the   prior CT head from 7/5/2024 which may reflect intraparenchymal hemorrhage.    There is stable enlargement of the sulci, sylvian fissures, and   ventricles, reflecting moderate diffuse parenchymal volume loss.    There are stable extensive  patchy low attenuations in bilateral   periventricular and subcortical cerebral white matter consistent with   severe chronic microvascular ischemic changes. There are stable chronic   lacunar infarcts in bilateral basal ganglia.    No evidence of acute territorial infarct or mass lesion. No   hydrocephalus. There are no extra-axial fluid collections.    The visualized intraorbital contents are normal. There is near complete   opacification of the left sphenoid sinus. Themastoid air cells are   clear. The visualized soft tissues and osseous structures appear normal.      CT CERVICAL SPINE:    Stable mild retrolisthesis of C3 on C4 and C4 on C5.    There is no evidence of acute fracture, compression deformity or facet  subluxation of the cervical spine.    The atlantoaxial relationships are maintained. The posterior elements are   intact. There is no significant prevertebral soft tissue swelling. The   visualized paraspinal soft tissues are unremarkable.    Stable severe multilevel endplate degenerative changes with marginal   osteophyte formation, uncovertebral spurring, loss of normal disc space   height as well as facet joint space compartment narrowing.    There is no high-grade spinal canal stenosis. Please note spinal canal   contents are suboptimally evaluated inherent to CT technique.    The visualized lung apices are within normal limits.    There is partially imaged small right pleural effusion.    There is 0.6 cm hypodense nodule in the anterior right thyroid.      IMPRESSION:    CT HEAD:  New small patchy hyperdensity in the left thalamus since the prior CT   head from 7/5/2024 could potentially represent intraparenchymal   hemorrhage. Recommend short-term follow-up.    CT CERVICAL SPINE:  No acute cervical fracture or facet subluxation.    Small right pleural effusion.    Communication: The summary of above findings were discussed with readback   confirmation with Dr. Nunes by radiologist Dr. García on 3/24/2025 at 2:42 PM.    --- End of Report ---            DEANNA GARCÍA MD; Attending Radiologist  This document has been electronically signed. Mar 24 2025  2:51PM (03-24-25 @ 14:01)      CT Angiography/Perfusion (if applicable):    MRI Brain NC (if applicable):    MRA Head/Neck (if applicable):    Echocardiography:    [place]/Transcranial Duplex:    Assessment:  This is a 89y Female with h/o     Risks and benefits including alternatives to treatment with thrombolysis with IV TPA (alteplase) discussed with patient/family at length including significant morbidity/mortality with or without treatment in acute stroke setting.  All questions and concerns at the time of evaluation answered and addressed.      Plan:   -   03-25-25 @ 04:33       ***STROKE CONSULT NOTE***    HPI:  89F, Croatian-speaking, PMH HTN, presents with decreased oral intake, nonverbal, and bedbound 2 weeks. Neurology consulted for CTH finding of left patchy thalamic hyperdensity concerning for IPH. Per chart review, patient had a mechanical fall when walking towards her walker approximately 2 weeks ago, with reassuring X-rays. Since then, patient has been nonverbal although still aware, eating less, and bedbound. At baseline, she ambulates with a walker and lives at home alone with home care. Neurosurgery has evaluated patient, no intervention. Patient is able to say her name, nod or shake head to questions, and follows simple commands. She denies headache, dizziness, neck pain, weakness, or vision change.       PAST MEDICAL & SURGICAL HISTORY:  No pertinent past medical history      FAMILY HISTORY:    Social History: (-) x 3    Allergies  No Known Allergies    Intolerances      MEDICATIONS  (STANDING):  amLODIPine   Tablet 2.5 milliGRAM(s) Oral daily  atorvastatin 40 milliGRAM(s) Oral at bedtime  azithromycin  IVPB      cefTRIAXone   IVPB      pantoprazole    Tablet 40 milliGRAM(s) Oral before breakfast    MEDICATIONS  (PRN):  acetaminophen     Tablet .. 650 milliGRAM(s) Oral every 6 hours PRN Temp greater or equal to 38C (100.4F), Mild Pain (1 - 3)  aluminum hydroxide/magnesium hydroxide/simethicone Suspension 30 milliLiter(s) Oral every 4 hours PRN Dyspepsia  bisacodyl Suppository 10 milliGRAM(s) Rectal daily PRN Constipation  melatonin 3 milliGRAM(s) Oral at bedtime PRN Insomnia      Vital Signs Last 24 Hrs  T(C): 35.9 (25 Mar 2025 02:41), Max: 36.6 (24 Mar 2025 12:40)  T(F): 96.6 (25 Mar 2025 02:41), Max: 97.9 (24 Mar 2025 12:40)  HR: 102 (25 Mar 2025 02:41) (100 - 104)  BP: 117/65 (25 Mar 2025 02:41) (105/57 - 121/53)  BP(mean): --  RR: 19 (25 Mar 2025 02:41) (18 - 20)  SpO2: 92% (25 Mar 2025 02:41) (92% - 100%)    Parameters below as of 25 Mar 2025 02:41  Patient On (Oxygen Delivery Method): nasal cannula    Weight (kg): 46.2 (03-24-25 @ 17:28)      Neurologic Examination:  General: Appearance is consistent with chronologic age.  No abnormal facies.   General: The patient is oriented to person, place, not to time or age. Only says her name. Follows 1-step commands  Cranial nerves: intact VA, VFF.  EOMI w/o nystagmus, skew or reported double vision.  PERRL.  No ptosis/weakness of eyelid closure.  Facial sensation is normal with normal bite.  No facial asymmetry.  Hearing grossly intact b/l.  Palate elevates midline.  Tongue midline.  Motor examination: Moves all extremities to light touch. Normal tone, bulk and range of motion.  No tenderness, twitching, tremors or involuntary movements.  Reflexes: 2+ b/l pectoralis, biceps, triceps, brachioradialis, patella and Achilles. Plantar response downgoing b/l. Jaw jerk, Mina, clonus absent.  Sensory examination: limited assessment, patient moves all limbs to touch and withdraws to vibration/coldness.  Cerebellum: unable to assess FTN or HTS, patient not able to follow complicated command.     NIHSS: 6 (LOC 2, task 1, mute 3)      Labs:   CBC Full  -  ( 24 Mar 2025 13:32 )  WBC Count : 12.98 K/uL  RBC Count : 3.31 M/uL  Hemoglobin : 9.7 g/dL  Hematocrit : 30.4 %  Platelet Count - Automated : 541 K/uL  Mean Cell Volume : 91.8 fL  Mean Cell Hemoglobin : 29.3 pg  Mean Cell Hemoglobin Concentration : 31.9 g/dL  Auto Neutrophil # : x  Auto Lymphocyte # : x  Auto Monocyte # : x  Auto Eosinophil # : x  Auto Basophil # : x  Auto Neutrophil % : x  Auto Lymphocyte % : x  Auto Monocyte % : x  Auto Eosinophil % : x  Auto Basophil % : x    03-24    136  |  99  |  113[HH]  ----------------------------<  91  5.3[H]   |  19  |  3.7[H]    Ca    8.8      24 Mar 2025 13:32    TPro  6.9  /  Alb  3.2[L]  /  TBili  0.4  /  DBili  x   /  AST  22  /  ALT  9   /  AlkPhos  146[H]  03-24    LIVER FUNCTIONS - ( 24 Mar 2025 13:32 )  Alb: 3.2 g/dL / Pro: 6.9 g/dL / ALK PHOS: 146 U/L / ALT: 9 U/L / AST: 22 U/L / GGT: x             Urinalysis Basic - ( 24 Mar 2025 13:32 )    Color: x / Appearance: x / SG: x / pH: x  Gluc: 91 mg/dL / Ketone: x  / Bili: x / Urobili: x   Blood: x / Protein: x / Nitrite: x   Leuk Esterase: x / RBC: x / WBC x   Sq Epi: x / Non Sq Epi: x / Bacteria: x      POCT Blood Glucose.: 90 mg/dL (03-24-25 @ 21:36)      Neuroimaging:  NCHCT: CT Head No Cont:   ACC: [id number] EXAM:  CT BRAIN   ORDERED BY: JANNET CHRISTIAN     PROCEDURE DATE:  03/24/2025          INTERPRETATION:  CLINICAL INDICATION: Altered mental status, follow-up   possible intraparenchymal hemorrhage in the left thalamus    TECHNIQUE: CT of the head was performed without the administration of   intravenous contrast. Coronal and sagittal reformats were obtained.    COMPARISON: CT head 3/24/2025 at 1:57 PM    FINDINGS:    Redemonstrated 0.7 cm patchy hyperdensity in the left thalamus, which may   reflect intraparenchymal hemorrhage. There is no mass effect or midline   shift.    Stable prominence of the sulci, sylvian fissures, and ventricles. This   reflects stable moderate diffuse parenchymal volume loss.    There are stable confluent low attenuations in the periventricular   cerebral white matter, reflecting stable severe chronic microvascular   ischemic changes.    No evidence of hydrocephalus. No extra-axial fluid collections.    The visualized intraorbital contents are unremarkable. Redemonstrated   near complete opacification of the left sphenoid sinus. The mastoid air   cells are aerated.    No acute depressed calvarial fracture.    IMPRESSION:    Since CT head 3/24/2025 at 1:57 PM:    Redemonstrated 0.7 cm patchy hyperdensity in the left thalamus, which may   reflect intraparenchymal hemorrhage. There has been no increase in the   area of hyperdensity.    --- End of Report ---          MICHAEL MACIEL MD; Resident Radiologist  This document has been electronically signed.  CARMEL FERGUSON MD; Attending Interventional Radiologist  This document has been electronically signed. Mar 24 2025 10:59PM (03-24-25 @ 22:37)  CT Head No Cont:   ACC: [id number] EXAM:  CT CERVICAL SPINE   ORDERED BY: JACKELYN NUNES     ACC: [id number] EXAM:  CT BRAIN   ORDERED BY: JACKELYN NUNES     PROCEDURE DATE:  03/24/2025          INTERPRETATION:  CLINICAL INDICATION: Altered mental status.    TECHNIQUE: CT of thehead and cervical spine was performed without the   administration of intravenous contrast.    COMPARISON: CT head and C-spine dated 7/5/2024. Correlated with MR brain   dated 7/7/2024.    FINDINGS:    CT HEAD:    There is new 0.7 cm patchy hyperdensity in the left thalamus since the   prior CT head from 7/5/2024 which may reflect intraparenchymal hemorrhage.    There is stable enlargement of the sulci, sylvian fissures, and   ventricles, reflecting moderate diffuse parenchymal volume loss.    There are stable extensive  patchy low attenuations in bilateral   periventricular and subcortical cerebral white matter consistent with   severe chronic microvascular ischemic changes. There are stable chronic   lacunar infarcts in bilateral basal ganglia.    No evidence of acute territorial infarct or mass lesion. No   hydrocephalus. There are no extra-axial fluid collections.    The visualized intraorbital contents are normal. There is near complete   opacification of the left sphenoid sinus. Themastoid air cells are   clear. The visualized soft tissues and osseous structures appear normal.      CT CERVICAL SPINE:    Stable mild retrolisthesis of C3 on C4 and C4 on C5.    There is no evidence of acute fracture, compression deformity or facet  subluxation of the cervical spine.    The atlantoaxial relationships are maintained. The posterior elements are   intact. There is no significant prevertebral soft tissue swelling. The   visualized paraspinal soft tissues are unremarkable.    Stable severe multilevel endplate degenerative changes with marginal   osteophyte formation, uncovertebral spurring, loss of normal disc space   height as well as facet joint space compartment narrowing.    There is no high-grade spinal canal stenosis. Please note spinal canal   contents are suboptimally evaluated inherent to CT technique.    The visualized lung apices are within normal limits.    There is partially imaged small right pleural effusion.    There is 0.6 cm hypodense nodule in the anterior right thyroid.      IMPRESSION:    CT HEAD:  New small patchy hyperdensity in the left thalamus since the prior CT   head from 7/5/2024 could potentially represent intraparenchymal   hemorrhage. Recommend short-term follow-up.    CT CERVICAL SPINE:  No acute cervical fracture or facet subluxation.    Small right pleural effusion.    Communication: The summary of above findings were discussed with readback   confirmation with Dr. Nunes by radiologist Dr. García on 3/24/2025 at 2:42 PM.    --- End of Report ---      DEANNA GARCÍA MD; Attending Radiologist  This document has been electronically signed. Mar 24 2025  2:51PM (03-24-25 @ 14:01)       ***STROKE CONSULT NOTE***    HPI:  89F, Kazakh-speaking, PMH HTN on aspirin, presents with decreased oral intake, nonverbal, and bedbound 2 weeks. Neurology consulted for CTH finding of left patchy thalamic hyperdensity concerning for IPH. Per chart review, patient had a mechanical fall when walking towards her walker approximately 2 weeks ago, with reassuring X-rays. Since then, patient has been nonverbal although still aware, eating less, and bedbound. At baseline, she ambulates with a walker and lives at home alone with home care. Neurosurgery has evaluated patient, no intervention. Patient is able to say her name, nod or shake head to questions, and follows simple commands. She denies headache, dizziness, neck pain, weakness, or vision change.       PAST MEDICAL & SURGICAL HISTORY:  No pertinent past medical history      FAMILY HISTORY:    Social History: (-) x 3    Allergies  No Known Allergies    Intolerances      MEDICATIONS  (STANDING):  amLODIPine   Tablet 2.5 milliGRAM(s) Oral daily  atorvastatin 40 milliGRAM(s) Oral at bedtime  azithromycin  IVPB      cefTRIAXone   IVPB      pantoprazole    Tablet 40 milliGRAM(s) Oral before breakfast    MEDICATIONS  (PRN):  acetaminophen     Tablet .. 650 milliGRAM(s) Oral every 6 hours PRN Temp greater or equal to 38C (100.4F), Mild Pain (1 - 3)  aluminum hydroxide/magnesium hydroxide/simethicone Suspension 30 milliLiter(s) Oral every 4 hours PRN Dyspepsia  bisacodyl Suppository 10 milliGRAM(s) Rectal daily PRN Constipation  melatonin 3 milliGRAM(s) Oral at bedtime PRN Insomnia      Vital Signs Last 24 Hrs  T(C): 35.9 (25 Mar 2025 02:41), Max: 36.6 (24 Mar 2025 12:40)  T(F): 96.6 (25 Mar 2025 02:41), Max: 97.9 (24 Mar 2025 12:40)  HR: 102 (25 Mar 2025 02:41) (100 - 104)  BP: 117/65 (25 Mar 2025 02:41) (105/57 - 121/53)  BP(mean): --  RR: 19 (25 Mar 2025 02:41) (18 - 20)  SpO2: 92% (25 Mar 2025 02:41) (92% - 100%)    Parameters below as of 25 Mar 2025 02:41  Patient On (Oxygen Delivery Method): nasal cannula    Weight (kg): 46.2 (03-24-25 @ 17:28)      Neurologic Examination:  General: Appearance is consistent with chronologic age.  No abnormal facies.   General: The patient is oriented to person, place, not to time or age. Only says her name. Follows 1-step commands  Cranial nerves: intact VA, VFF.  EOMI w/o nystagmus, skew or reported double vision.  PERRL.  No ptosis/weakness of eyelid closure.  Facial sensation is normal with normal bite.  No facial asymmetry.  Hearing grossly intact b/l.  Palate elevates midline.  Tongue midline.  Motor examination: Moves all extremities to light touch. Normal tone, bulk and range of motion.  No tenderness, twitching, tremors or involuntary movements.  Reflexes: 2+ b/l pectoralis, biceps, triceps, brachioradialis, patella and Achilles. Plantar response downgoing b/l. Jaw jerk, Mina, clonus absent.  Sensory examination: limited assessment, patient moves all limbs to touch and withdraws to vibration/coldness.  Cerebellum: unable to assess FTN or HTS, patient not able to follow complicated command.     NIHSS: 6 (LOC 2, task 1, mute 3)      Labs:   CBC Full  -  ( 24 Mar 2025 13:32 )  WBC Count : 12.98 K/uL  RBC Count : 3.31 M/uL  Hemoglobin : 9.7 g/dL  Hematocrit : 30.4 %  Platelet Count - Automated : 541 K/uL  Mean Cell Volume : 91.8 fL  Mean Cell Hemoglobin : 29.3 pg  Mean Cell Hemoglobin Concentration : 31.9 g/dL  Auto Neutrophil # : x  Auto Lymphocyte # : x  Auto Monocyte # : x  Auto Eosinophil # : x  Auto Basophil # : x  Auto Neutrophil % : x  Auto Lymphocyte % : x  Auto Monocyte % : x  Auto Eosinophil % : x  Auto Basophil % : x    03-24    136  |  99  |  113[HH]  ----------------------------<  91  5.3[H]   |  19  |  3.7[H]    Ca    8.8      24 Mar 2025 13:32    TPro  6.9  /  Alb  3.2[L]  /  TBili  0.4  /  DBili  x   /  AST  22  /  ALT  9   /  AlkPhos  146[H]  03-24    LIVER FUNCTIONS - ( 24 Mar 2025 13:32 )  Alb: 3.2 g/dL / Pro: 6.9 g/dL / ALK PHOS: 146 U/L / ALT: 9 U/L / AST: 22 U/L / GGT: x             Urinalysis Basic - ( 24 Mar 2025 13:32 )    Color: x / Appearance: x / SG: x / pH: x  Gluc: 91 mg/dL / Ketone: x  / Bili: x / Urobili: x   Blood: x / Protein: x / Nitrite: x   Leuk Esterase: x / RBC: x / WBC x   Sq Epi: x / Non Sq Epi: x / Bacteria: x      POCT Blood Glucose.: 90 mg/dL (03-24-25 @ 21:36)      Neuroimaging:  NCHCT: CT Head No Cont:   ACC: [id number] EXAM:  CT BRAIN   ORDERED BY: JANNET CHRISTIAN     PROCEDURE DATE:  03/24/2025          INTERPRETATION:  CLINICAL INDICATION: Altered mental status, follow-up   possible intraparenchymal hemorrhage in the left thalamus    TECHNIQUE: CT of the head was performed without the administration of   intravenous contrast. Coronal and sagittal reformats were obtained.    COMPARISON: CT head 3/24/2025 at 1:57 PM    FINDINGS:    Redemonstrated 0.7 cm patchy hyperdensity in the left thalamus, which may   reflect intraparenchymal hemorrhage. There is no mass effect or midline   shift.    Stable prominence of the sulci, sylvian fissures, and ventricles. This   reflects stable moderate diffuse parenchymal volume loss.    There are stable confluent low attenuations in the periventricular   cerebral white matter, reflecting stable severe chronic microvascular   ischemic changes.    No evidence of hydrocephalus. No extra-axial fluid collections.    The visualized intraorbital contents are unremarkable. Redemonstrated   near complete opacification of the left sphenoid sinus. The mastoid air   cells are aerated.    No acute depressed calvarial fracture.    IMPRESSION:    Since CT head 3/24/2025 at 1:57 PM:    Redemonstrated 0.7 cm patchy hyperdensity in the left thalamus, which may   reflect intraparenchymal hemorrhage. There has been no increase in the   area of hyperdensity.    --- End of Report ---          MICHAEL MACIEL MD; Resident Radiologist  This document has been electronically signed.  CARMEL FERGUSON MD; Attending Interventional Radiologist  This document has been electronically signed. Mar 24 2025 10:59PM (03-24-25 @ 22:37)  CT Head No Cont:   ACC: [id number] EXAM:  CT CERVICAL SPINE   ORDERED BY: JACKELYN NUNES     ACC: [id number] EXAM:  CT BRAIN   ORDERED BY: JACKELYN NUNES     PROCEDURE DATE:  03/24/2025          INTERPRETATION:  CLINICAL INDICATION: Altered mental status.    TECHNIQUE: CT of thehead and cervical spine was performed without the   administration of intravenous contrast.    COMPARISON: CT head and C-spine dated 7/5/2024. Correlated with MR brain   dated 7/7/2024.    FINDINGS:    CT HEAD:    There is new 0.7 cm patchy hyperdensity in the left thalamus since the   prior CT head from 7/5/2024 which may reflect intraparenchymal hemorrhage.    There is stable enlargement of the sulci, sylvian fissures, and   ventricles, reflecting moderate diffuse parenchymal volume loss.    There are stable extensive  patchy low attenuations in bilateral   periventricular and subcortical cerebral white matter consistent with   severe chronic microvascular ischemic changes. There are stable chronic   lacunar infarcts in bilateral basal ganglia.    No evidence of acute territorial infarct or mass lesion. No   hydrocephalus. There are no extra-axial fluid collections.    The visualized intraorbital contents are normal. There is near complete   opacification of the left sphenoid sinus. Themastoid air cells are   clear. The visualized soft tissues and osseous structures appear normal.      CT CERVICAL SPINE:    Stable mild retrolisthesis of C3 on C4 and C4 on C5.    There is no evidence of acute fracture, compression deformity or facet  subluxation of the cervical spine.    The atlantoaxial relationships are maintained. The posterior elements are   intact. There is no significant prevertebral soft tissue swelling. The   visualized paraspinal soft tissues are unremarkable.    Stable severe multilevel endplate degenerative changes with marginal   osteophyte formation, uncovertebral spurring, loss of normal disc space   height as well as facet joint space compartment narrowing.    There is no high-grade spinal canal stenosis. Please note spinal canal   contents are suboptimally evaluated inherent to CT technique.    The visualized lung apices are within normal limits.    There is partially imaged small right pleural effusion.    There is 0.6 cm hypodense nodule in the anterior right thyroid.      IMPRESSION:    CT HEAD:  New small patchy hyperdensity in the left thalamus since the prior CT   head from 7/5/2024 could potentially represent intraparenchymal   hemorrhage. Recommend short-term follow-up.    CT CERVICAL SPINE:  No acute cervical fracture or facet subluxation.    Small right pleural effusion.    Communication: The summary of above findings were discussed with readback   confirmation with Dr. Nunes by radiologist Dr. García on 3/24/2025 at 2:42 PM.    --- End of Report ---      DEANNA GARCÍA MD; Attending Radiologist  This document has been electronically signed. Mar 24 2025  2:51PM (03-24-25 @ 14:01)       ***STROKE CONSULT NOTE***    HPI:  89F, French-speaking, PMH HTN on aspirin, presents with decreased oral intake, nonverbal, and bedbound 2 weeks. Neurology consulted for CTH finding of left patchy thalamic hyperdensity concerning for IPH. Per chart review, patient had a mechanical fall when walking towards her walker approximately 2 weeks ago, with reassuring X-rays. Since then, patient has been nonverbal although still aware, eating less, and bedbound. At baseline, she ambulates with a walker and lives at home alone with home care. Neurosurgery has evaluated patient, no intervention. Patient is able to say her name, nod or shake head to questions, and follows simple commands. She denies headache, dizziness, neck pain, weakness, or vision change.       PAST MEDICAL & SURGICAL HISTORY:  No pertinent past medical history      FAMILY HISTORY:    Social History: (-) x 3    Allergies  No Known Allergies    Intolerances      MEDICATIONS  (STANDING):  amLODIPine   Tablet 2.5 milliGRAM(s) Oral daily  atorvastatin 40 milliGRAM(s) Oral at bedtime  azithromycin  IVPB      cefTRIAXone   IVPB      pantoprazole    Tablet 40 milliGRAM(s) Oral before breakfast    MEDICATIONS  (PRN):  acetaminophen     Tablet .. 650 milliGRAM(s) Oral every 6 hours PRN Temp greater or equal to 38C (100.4F), Mild Pain (1 - 3)  aluminum hydroxide/magnesium hydroxide/simethicone Suspension 30 milliLiter(s) Oral every 4 hours PRN Dyspepsia  bisacodyl Suppository 10 milliGRAM(s) Rectal daily PRN Constipation  melatonin 3 milliGRAM(s) Oral at bedtime PRN Insomnia      Vital Signs Last 24 Hrs  T(C): 35.9 (25 Mar 2025 02:41), Max: 36.6 (24 Mar 2025 12:40)  T(F): 96.6 (25 Mar 2025 02:41), Max: 97.9 (24 Mar 2025 12:40)  HR: 102 (25 Mar 2025 02:41) (100 - 104)  BP: 117/65 (25 Mar 2025 02:41) (105/57 - 121/53)  BP(mean): --  RR: 19 (25 Mar 2025 02:41) (18 - 20)  SpO2: 92% (25 Mar 2025 02:41) (92% - 100%)    Parameters below as of 25 Mar 2025 02:41  Patient On (Oxygen Delivery Method): nasal cannula    Weight (kg): 46.2 (03-24-25 @ 17:28)      Neurologic Examination:  General: Appearance is consistent with chronologic age.  No abnormal facies.   General: The patient is oriented to person, place, not to time or age. Only says her name. Follows 1-step commands  Cranial nerves: intact VA, VFF.  EOMI w/o nystagmus, skew or reported double vision.  PERRL.  No ptosis/weakness of eyelid closure.  Facial sensation is normal with normal bite.  No facial asymmetry.  Hearing grossly intact b/l.  Palate elevates midline.  Tongue midline.  Motor examination: Moves all extremities to light touch. Normal tone, bulk and range of motion.  No tenderness, twitching, tremors or involuntary movements.  Reflexes: 2+ b/l pectoralis, biceps, triceps, brachioradialis, patella and Achilles. Plantar response downgoing b/l. Jaw jerk, Mina, clonus absent.  Sensory examination: limited assessment, patient moves all limbs to touch and withdraws to vibration/coldness.  Cerebellum: unable to assess FTN or HTS, patient not able to follow complicated command.     NIHSS: 6 (LOC 2, task 1, mute 3)  ICH score: 1 (age>80) => 13-17% 30-day mortality      Labs:   CBC Full  -  ( 24 Mar 2025 13:32 )  WBC Count : 12.98 K/uL  RBC Count : 3.31 M/uL  Hemoglobin : 9.7 g/dL  Hematocrit : 30.4 %  Platelet Count - Automated : 541 K/uL  Mean Cell Volume : 91.8 fL  Mean Cell Hemoglobin : 29.3 pg  Mean Cell Hemoglobin Concentration : 31.9 g/dL  Auto Neutrophil # : x  Auto Lymphocyte # : x  Auto Monocyte # : x  Auto Eosinophil # : x  Auto Basophil # : x  Auto Neutrophil % : x  Auto Lymphocyte % : x  Auto Monocyte % : x  Auto Eosinophil % : x  Auto Basophil % : x    03-24    136  |  99  |  113[HH]  ----------------------------<  91  5.3[H]   |  19  |  3.7[H]    Ca    8.8      24 Mar 2025 13:32    TPro  6.9  /  Alb  3.2[L]  /  TBili  0.4  /  DBili  x   /  AST  22  /  ALT  9   /  AlkPhos  146[H]  03-24    LIVER FUNCTIONS - ( 24 Mar 2025 13:32 )  Alb: 3.2 g/dL / Pro: 6.9 g/dL / ALK PHOS: 146 U/L / ALT: 9 U/L / AST: 22 U/L / GGT: x             Urinalysis Basic - ( 24 Mar 2025 13:32 )    Color: x / Appearance: x / SG: x / pH: x  Gluc: 91 mg/dL / Ketone: x  / Bili: x / Urobili: x   Blood: x / Protein: x / Nitrite: x   Leuk Esterase: x / RBC: x / WBC x   Sq Epi: x / Non Sq Epi: x / Bacteria: x      POCT Blood Glucose.: 90 mg/dL (03-24-25 @ 21:36)      Neuroimaging:    NCHCT: CT Head No Cont:   ACC: [id number] EXAM:  CT BRAIN   ORDERED BY: JANNET CHRISTIAN     PROCEDURE DATE:  03/24/2025          INTERPRETATION:  CLINICAL INDICATION: Altered mental status, follow-up   possible intraparenchymal hemorrhage in the left thalamus    TECHNIQUE: CT of the head was performed without the administration of   intravenous contrast. Coronal and sagittal reformats were obtained.    COMPARISON: CT head 3/24/2025 at 1:57 PM    FINDINGS:    Redemonstrated 0.7 cm patchy hyperdensity in the left thalamus, which may   reflect intraparenchymal hemorrhage. There is no mass effect or midline   shift.    Stable prominence of the sulci, sylvian fissures, and ventricles. This   reflects stable moderate diffuse parenchymal volume loss.    There are stable confluent low attenuations in the periventricular   cerebral white matter, reflecting stable severe chronic microvascular   ischemic changes.    No evidence of hydrocephalus. No extra-axial fluid collections.    The visualized intraorbital contents are unremarkable. Redemonstrated   near complete opacification of the left sphenoid sinus. The mastoid air   cells are aerated.    No acute depressed calvarial fracture.    IMPRESSION:    Since CT head 3/24/2025 at 1:57 PM:    Redemonstrated 0.7 cm patchy hyperdensity in the left thalamus, which may   reflect intraparenchymal hemorrhage. There has been no increase in the   area of hyperdensity.    --- End of Report ---          MICHAEL SERGEV MD; Resident Radiologist  This document has been electronically signed.  CARMEL FERGUSON MD; Attending Interventional Radiologist  This document has been electronically signed. Mar 24 2025 10:59PM (03-24-25 @ 22:37)  CT Head No Cont:   ACC: [id number] EXAM:  CT CERVICAL SPINE   ORDERED BY: JACKELYN NUNES     ACC: [id number] EXAM:  CT BRAIN   ORDERED BY: JACKELYN NUNES     PROCEDURE DATE:  03/24/2025          INTERPRETATION:  CLINICAL INDICATION: Altered mental status.    TECHNIQUE: CT of the head and cervical spine was performed without the   administration of intravenous contrast.    COMPARISON: CT head and C-spine dated 7/5/2024. Correlated with MR brain   dated 7/7/2024.    FINDINGS:    CT HEAD:    There is new 0.7 cm patchy hyperdensity in the left thalamus since the   prior CT head from 7/5/2024 which may reflect intraparenchymal hemorrhage.    There is stable enlargement of the sulci, sylvian fissures, and   ventricles, reflecting moderate diffuse parenchymal volume loss.    There are stable extensive  patchy low attenuations in bilateral   periventricular and subcortical cerebral white matter consistent with   severe chronic microvascular ischemic changes. There are stable chronic   lacunar infarcts in bilateral basal ganglia.    No evidence of acute territorial infarct or mass lesion. No   hydrocephalus. There are no extra-axial fluid collections.    The visualized intraorbital contents are normal. There is near complete   opacification of the left sphenoid sinus. Themastoid air cells are   clear. The visualized soft tissues and osseous structures appear normal.      CT CERVICAL SPINE:    Stable mild retrolisthesis of C3 on C4 and C4 on C5.    There is no evidence of acute fracture, compression deformity or facet  subluxation of the cervical spine.    The atlantoaxial relationships are maintained. The posterior elements are   intact. There is no significant prevertebral soft tissue swelling. The   visualized paraspinal soft tissues are unremarkable.    Stable severe multilevel endplate degenerative changes with marginal   osteophyte formation, uncovertebral spurring, loss of normal disc space   height as well as facet joint space compartment narrowing.    There is no high-grade spinal canal stenosis. Please note spinal canal   contents are suboptimally evaluated inherent to CT technique.    The visualized lung apices are within normal limits.    There is partially imaged small right pleural effusion.    There is 0.6 cm hypodense nodule in the anterior right thyroid.      IMPRESSION:    CT HEAD:  New small patchy hyperdensity in the left thalamus since the prior CT   head from 7/5/2024 could potentially represent intraparenchymal   hemorrhage. Recommend short-term follow-up.    CT CERVICAL SPINE:  No acute cervical fracture or facet subluxation.    Small right pleural effusion.    Communication: The summary of above findings were discussed with readback   confirmation with Dr. Nunes by radiologist Dr. García on 3/24/2025 at 2:42 PM.    --- End of Report ---      DEANNA GARCÍA MD; Attending Radiologist  This document has been electronically signed. Mar 24 2025  2:51PM (03-24-25 @ 14:01)

## 2025-03-25 NOTE — PHYSICAL THERAPY INITIAL EVALUATION ADULT - PERTINENT HX OF CURRENT PROBLEM, REHAB EVAL
Ms. Moncada is an 89-year-old Setswana-speaking female PMH HTN, CKD(?) does not follow-up with any doctor presented to the ED for decreased p.o. intake, nonverbal status, bedbound last 2 weeks.   Per family, patient lives home alone with home nursing care.  She typically ambulates with a walker at baseline.  Reportedly had a mechanical fall when walking towards her walker approximately 2 weeks ago.  Unknown downtime but estimated about 1 to 2 hours.  States that had home health obtain x-rays of chest, pelvis, right forearm which were reassuring.  However states that since then, patient has been nonverbal although still aware.  States that she has not been eating or drinking significantly over these past 2 weeks since the fall.  Has been mostly bedbound and requiring diaper changes.  Has been treated with amoxicillin for congestion by visiting nurse over the past few days. Family denies patient with any shortness of breath, chest pain, fevers, chills, nausea/vomiting, diarrhea.

## 2025-03-25 NOTE — ADVANCED PRACTICE NURSE CONSULT - ASSESSMENT
History of Present Illness:   HISTORY OF PRESENT ILLNESS & PAST MEDICAL HISTORY  Ms. Moncada is an 89-year-old Divehi-speaking female PMH HTN, CKD(?) does not follow-up with any doctor presented to the ED for decreased p.o. intake, nonverbal status, bedbound last 2 weeks.   Per family, patient lives home alone with home nursing care.  She typically ambulates with a walker at baseline.  Reportedly had a mechanical fall when walking towards her walker approximately 2 weeks ago.  Unknown downtime but estimated about 1 to 2 hours.  States that had home health obtain x-rays of chest, pelvis, right forearm which were reassuring.  However states that since then, patient has been nonverbal although still aware.  States that she has not been eating or drinking significantly over these past 2 weeks since the fall.  Has been mostly bedbound and requiring diaper changes.  Has been treated with amoxicillin for congestion by visiting nurse over the past few days.    Family denies patient with any shortness of breath, chest pain, fevers, chills, nausea/vomiting, diarrhea.       Allergies:  	No Known Allergies:     Home Medications:   * Patient Currently Takes Medications as of 25-Mar-2025 00:49 documented in Structured Notes  · 	amLODIPine 2.5 mg oral tablet: Last Dose Taken:  , 1 tab(s) orally once a day  · 	aspirin 81 mg oral tablet, chewable: Last Dose Taken:  , 1 tab(s) orally once a day  · 	vitamin D-calcium (as carbonate) 5 mcg-500 mg oral tablet: Last Dose Taken:  , 1 tab(s) orally once a day  · 	atorvastatin 40 mg oral tablet: Last Dose Taken:  , 1 tab(s) orally once a day (at bedtime)  · 	ferrous fumarate 324 mg (106 mg elemental iron) oral tablet: Last Dose Taken:  , 1 tab(s) orally once a day    Patient received in bed, limited mobility, high risk for pressure injury development or progression.    Wound  Type & Location: Sacrococcygeal Unstageable   Size: ~[id number]  Tissue Description: yellow and brown devitalized tissue with pink borders   Wound Exudate: Scant, yellow   Wound Edge: intact  Shayna wound Condition: intact     Bilateral heels are intact with vascular hyperpigmentation.

## 2025-03-25 NOTE — PHYSICAL THERAPY INITIAL EVALUATION ADULT - ADDITIONAL COMMENTS
Pt is poor historian. As per chart review pt is nonverbal, unsure if nonverbal at baseline. As per chart review pt lives alone,  Amb using RW, assist from [facility] with ADL's.

## 2025-03-25 NOTE — PHYSICAL THERAPY INITIAL EVALUATION ADULT - IMPAIRMENTS CONTRIBUTING IMPAIRED BED MOBILITY, REHAB EVAL
pt was grimacing upon sitting at EOB however was not vocal/impaired balance/decreased flexibility/decreased strength

## 2025-03-25 NOTE — SWALLOW BEDSIDE ASSESSMENT ADULT - SLP PERTINENT HISTORY OF CURRENT PROBLEM
Ms. Moncada is an 89-year-old French-speaking female PMH HTN, CKD(?) does not follow-up with any doctor presented to the ED for decreased p.o. intake, nonverbal status, bedbound last 2 weeks.

## 2025-03-25 NOTE — PHYSICAL THERAPY INITIAL EVALUATION ADULT - GENERAL OBSERVATIONS, REHAB EVAL
13:35-14:01. Pt encountered semifowler in bed in NAD, + O2 2 lpm via NC, + IV, + primafit, pt nonverbal , not sure if nonverbal at baseline. Pt is Japanese speaking as per chart,  utilized using [employer] interpreters Viktor ID # [id number], however pt was not able to answer questions or nod yes or no. Pt with flat affect, however pt did not oppose PT/mobility when PT attempted to mobilize pt.

## 2025-03-25 NOTE — CONSULT NOTE ADULT - ATTENDING COMMENTS
This note reflects my exam and care of this patient on 3/24/2025.    This is 89 y/o female w/ no significant PMHx presents to the ED as a trauma alert s/p Fall three weeks ago ?HT, ?LOC, -AC. Pt daughter in law reports the patient fell three weeks ago while trying to get up from her bed to get to her walker across the room. She then crawled to the bed but was unable to get herself up. The pt lives at home alone with home nursing care who notified the daughter in law that her mother in law had fallen. Since the fall the pt has been non-verbal, decreased PO intake, and has spent more time in bed. Pt daughter in law reports home health had obtained CXR, Pelvis xray, and right forearm xray which were unremarkable. During this time the patient developed a cough and was started on amoxicillin and finished her course. Initially the Pt did not want to come to the hospital however today the pt was agreeable when her daughter in law asked if she would come to the hospital. No external signs of trauma.    Primary and secondary surveys were performed.    AAO x3  GCS 15.  Neuro intact.  Neck: no step-offs  Chest: decreased breath sounds in bilateral lung bases, R>L  CV : RRR  Abdomen: soft  Extr: no deformities    I personally reviewed and interpreted all images and labs:  CT Head - Small Left Thalamus Intraparenchymal Hematoma;  CT C-spine - no injury  CT Chest/Abd/Pelvis - Right Pleural Effusion; Right 2-7th Rib Fractures; Left 5-6th Rib Fractures, subacute  Trop 189  Creat 3.7    ASSESSMENT:  89 y/o female, S/P Fall 3 weeks ago.  Subacute  Left Thalamus Intraparenchymal Hematoma.  Subacute Right 2-7th Rib Fractures and Left 5-6th Rib Fractures.  Right pleural effusion.  Failure to thrive.  Elevated Troponin.  ZEKE and CKD.  Physical deconditioning.    PLAN:  - Neurosurgery eval - had repeat CT head that showed stable IPH  - needs Neurology eval  - pain control - rib fractures are subacute; avoid opioids  - needs Pulmonary eval for Right pleural effusion - needs to be drained  - follow Trop  - follow serum electrolytes and UOP  - DVT prophylaxis  - needs nutritional support  Patient would benefit of Medicine admission.  Will follow for tertiary survey.
Patient seen and examined and agree with above except as noted.  Patients history, notes ,labs, imaging, vitals and meds reviewed personally.  Symptom onset 2 weeks ago  Thalamic hemorrhage appears stable  would restart dvt prophylaxsis today after 1pm  Can decrease neurochecks to q 8 hours after 1pm today    Plan as above

## 2025-03-25 NOTE — PROGRESS NOTE ADULT - ASSESSMENT
Ms. Moncada is an 89-year-old Macedonian-speaking female PMH HTN, CKD(?) does not follow-up with any doctor admitted for IPH s/p fall     Fall  IPH  Multiple Rib Fx  -continue home amlodipine 2.5 mg qd, maintain SBP < 140  -hold home ASA and DVT ppx for now   -HOB > 45 degrees  -q8h neuro checks   -pain control  -f/u neurology eval  -MRI    Aspiration PNA/Pneumonitis  -will treat empirically with ceftriaxone + azithromycin  -procal, BCx, U strep & legionella   -aspiration precautions, S&S eval    ZEKE on CKD vs. CKD Progression  -s/p 2 L LR in ED  -repeat Cr in AM  -send urine studies for FeNa  -bladder scans q8h   -KUS    Stool [place]  -noted on CT  -dulcolax suppository prn  -Miralax + senna once cleared by S&S     DVT prophylaxis: hold for IPH  GI prophylaxis: PPI   Diet: NPO w/ meds, S&S eval   Code status: full code   Activity: Bedrest for now   Dispo: floor

## 2025-03-25 NOTE — H&P ADULT - ASSESSMENT
Ms. Moncada is an 89-year-old Mohawk-speaking female PMH HTN, CKD(?) does not follow-up with any doctor admitted for IPH s/p fall     Fall  IPH  Multiple Rib Fx   Ms. Moncada is an 89-year-old Indonesian-speaking female PMH HTN, CKD(?) does not follow-up with any doctor admitted for IPH s/p fall     Fall  IPH  Multiple Rib Fx  -continue home amlodipine 2.5 mg qd, maintain SBP < 140  -HOB > 45 degrees  -q8h neuro checks   -pain control  -f/u neurology eval    Aspiration PNA/Pneumonitis  -will treat empirically with ceftriaxone + azithromycin  -procal, BCx, U strep & legionella   -aspiration precautions, S&S eval    ZEKE on CKD vs. CKD Progression  -s/p 2 L LR in ED  -repeat Cr in AM  -send urine studies for FeNa  -bladder scans q8h     DVT prophylaxis: hold for IPH  GI prophylaxis: PPI   Diet: NPO w/ meds, S&S eval   Code status: full code   Activity: Bedrest for now   Dispo: floor         Ms. Moncada is an 89-year-old Pashto-speaking female PMH HTN, CKD(?) does not follow-up with any doctor admitted for IPH s/p fall     Fall  IPH  Multiple Rib Fx  -continue home amlodipine 2.5 mg qd, maintain SBP < 140  -hold home ASA and DVT ppx for now   -HOB > 45 degrees  -q8h neuro checks   -pain control  -f/u neurology eval    Aspiration PNA/Pneumonitis  -will treat empirically with ceftriaxone + azithromycin  -procal, BCx, U strep & legionella   -aspiration precautions, S&S eval    ZEKE on CKD vs. CKD Progression  -s/p 2 L LR in ED  -repeat Cr in AM  -send urine studies for FeNa  -bladder scans q8h     DVT prophylaxis: hold for IPH  GI prophylaxis: PPI   Diet: NPO w/ meds, S&S eval   Code status: full code   Activity: Bedrest for now   Dispo: floor         Ms. Moncada is an 89-year-old Sinhala-speaking female PMH HTN, CKD(?) does not follow-up with any doctor admitted for IPH s/p fall     Fall  IPH  Multiple Rib Fx  -continue home amlodipine 2.5 mg qd, maintain SBP < 140  -hold home ASA and DVT ppx for now   -HOB > 45 degrees  -q8h neuro checks   -pain control  -f/u neurology eval    Aspiration PNA/Pneumonitis  -will treat empirically with ceftriaxone + azithromycin  -procal, BCx, U strep & legionella   -aspiration precautions, S&S eval    ZEKE on CKD vs. CKD Progression  -s/p 2 L LR in ED  -repeat Cr in AM  -send urine studies for FeNa  -bladder scans q8h     Stool [place]  -noted on CT  -dulcolax suppository prn  -Miralax + senna once cleared by S&S     DVT prophylaxis: hold for IPH  GI prophylaxis: PPI   Diet: NPO w/ meds, S&S eval   Code status: full code   Activity: Bedrest for now   Dispo: floor

## 2025-03-25 NOTE — CONSULT NOTE ADULT - ASSESSMENT
89F, Kyrgyz-speaking, PMH HTN, presents with decreased oral intake, nonverbal, bedbound, and incontinence for 2 weeks after a fall. Neurology consulted for CTH finding of left patchy thalamic hyperdensity concerning for IPH. On exam patient A&O to self and place, follows simple commands, muted only saying her name. Neurosurgery and trauma evaluated and no intervention planned.     Recommendation:  - obtain MR head non contrast  - avoid hypertension, SBP goal 100-140  - if change in neuro exam, urgent CT head noncontrast  - avoid hypotonic fluids which worsens cerebral edema)  - Tylenol prn headache  - head of bed > 30 degrees  - stroke education and counseling  - physical therapy, occupational therapy, speech therapy consults    Patient will be staffed with the stroke attending.         89F, French-speaking, PMH HTN, on aspirin, presents with decreased oral intake, nonverbal, bedbound, and incontinence for 2 weeks after a fall. Neurology consulted for CTH finding of left patchy thalamic hyperdensity concerning for IPH. On exam patient A&O to self and place, follows simple commands, muted only saying her name. Neurosurgery and trauma evaluated and no intervention planned. Given deep location of bleed, suspect hypertensive cerebral vasculopathy, cerebral amyloid angiopathy, vascular malformations, or cerebral cavernous malformations as the etiology.     Recommendation:  - obtain MR head non contrast  - avoid hypertension, SBP goal 100-140  - if change in neuro exam, urgent CT head noncontrast  - hold home aspirin and chemical dvt ppx  - avoid hypotonic fluids which worsens cerebral edema)  - head of bed > 30 degrees  - Tylenol prn headache  - stroke education and counseling  - physical therapy, occupational therapy, speech therapy consults    Patient will be staffed with the stroke attending.         89F, Serbian-speaking, PMH HTN, on aspirin, presents with decreased oral intake, nonverbal, bedbound, and incontinence for 2 weeks after a fall. Neurology consulted for CTH finding of left patchy thalamic hyperdensity concerning for IPH. On exam patient A&O to self and place, follows simple commands, muted only saying her name. Neurosurgery and trauma evaluated and no intervention planned. Given deep location of bleed, suspect hypertensive cerebral vasculopathy, cerebral amyloid angiopathy, vascular malformations, or cerebral cavernous malformations as the etiology.     Recommendation:  - obtain MR head non contrast  - avoid hypertension, SBP goal 100-140  - neuro check (NIHSS) q4h for 24hr, then if MRI stable, q8h neuro check  - if change in neuro exam, urgent CT head noncontrast  - hold home aspirin 3 day and chemical DVT ppx for 24hr  - avoid hypotonic fluids which worsens cerebral edema  - head of bed > 30 degrees  - Tylenol prn headache  - stroke education and counseling  - physical therapy, occupational therapy, speech therapy consults    The above assessment and plan have been discussed with attending. Please call with any further concerns.           89F, Frisian-speaking, PMH HTN, on aspirin, presents with decreased oral intake, nonverbal, bedbound, and incontinence for 2 weeks after a fall. Neurology consulted for CTH finding of left patchy thalamic hyperdensity concerning for IPH. On exam patient A&O to self and place, follows simple commands, muted only saying her name. Neurosurgery and trauma evaluated and no intervention planned. Given deep location of bleed, suspect hypertensive cerebral vasculopathy, cerebral amyloid angiopathy, vascular malformations, or cerebral cavernous malformations as the etiology.     Recommendation:  - obtain MR head with and without contrast  - avoid hypertension, SBP goal 100-140  - neuro check (NIHSS) q4h for 24hr, then if MRI stable, q8h neuro check  - if change in neuro exam, urgent CT head noncontrast  - hold home aspirin 3 day and chemical DVT ppx for 24hr  - avoid hypotonic fluids which worsens cerebral edema  - head of bed > 30 degrees  - Tylenol prn headache  - stroke education and counseling  - physical therapy, occupational therapy, speech therapy consults    The above assessment and plan have been discussed with attending. Please call with any further concerns.           89F, Amharic-speaking, PMH HTN, on aspirin, presents with decreased oral intake, nonverbal, bedbound, and incontinence for 2 weeks after a fall. Neurology consulted for CTH finding of left patchy thalamic hyperdensity concerning for IPH. On exam patient A&O to self and place, follows simple commands, muted only saying her name. NIHSS 6. ICH score 1. Neurosurgery and trauma evaluated and no intervention planned. Given deep location of bleed, suspect hypertensive cerebral vasculopathy, cerebral amyloid angiopathy, vascular malformations, or cerebral cavernous malformations as the etiology.     Recommendation:  - obtain MR head with and without contrast  - avoid hypertension, SBP goal 100-140  - neuro check (NIHSS) q4h for 24hr, then if MRI stable, q8h neuro check  - if change in neuro exam, urgent CT head noncontrast  - hold home aspirin 3 day and chemical DVT ppx for 24hr  - avoid hypotonic fluids which worsens cerebral edema  - head of bed > 30 degrees  - Tylenol prn headache  - stroke education and counseling  - physical therapy, occupational therapy, speech therapy consults    The above assessment and plan have been discussed with attending. Please call with any further concerns.

## 2025-03-25 NOTE — H&P ADULT - HISTORY OF PRESENT ILLNESS
HISTORY OF PRESENT ILLNESS & PAST MEDICAL HISTORY  Ms. Moncada is an 89-year-old Kiswahili-speaking female PMH HTN, CKD(?) does not follow-up with any doctor presented to the ED for decreased p.o. intake, nonverbal status, bedbound last 2 weeks.     Per family, patient lives home alone with home nursing care.  She typically ambulates with a walker at baseline.  Reportedly had a mechanical fall when walking towards her walker approximately 2 weeks ago.  Unknown downtime but estimated about 1 to 2 hours.  States that had home health obtain x-rays of chest, pelvis, right forearm which were reassuring.  However states that since then, patient has been nonverbal although still aware.  States that she has not been eating or drinking significantly over these past 2 weeks since the fall.  Has been mostly bedbound and requiring diaper changes.  Has been treated with amoxicillin for congestion by visiting nurse over the past few days.      Family denies patient with any shortness of breath, chest pain, fevers, chills, nausea/vomiting, diarrhea.      VITALS & PHYSICAL EXAMINATION  Vitals in the ED  T(F): 97.7, Max: 97.9 (12:40)  HR: 100 (100 - 104)  BP: 121/53 (105/57 - 121/53)  RR: 20 (18 - 20)  SpO2: 100% (95% - 100%)    GEN: NAD   HEENT: EOMI. MMM   RESP: CTAB   CARD: S1 S2 RRR  ABD: NTND   EXT: no VERNON. PPP  NEURO: AOx1 (per family her baseline is "normal")    LABS                           9.7    12.98 )-----------( 541    MCV 91.8                 30.4     136  |  99  |  113  ----------------------------( 91      AG: x   5.3   |  19  |  3.7    Ca: 8.8   Phos: x    Mg: x     Protein, Total: 6.9 / Albumin: 3.2 /  T. Bili 0.4 / D. Bili x  /  AST 22 /  ALT 9  /      VBG pH: 7.29  /  pCO2: 43    /  pO2: 13    / HCO3: 21    /  Lactate: 2.6      Trop 189 >> 174      IMAGING  CT head non-con:  0.7 cm hyperdensity in left thalamus concerning for IPH.   Repeat CT 8 hours later showed no increase in the lesion    CT C/A/P non-con:  Mildly displaced acute or subacute fractures of the right 2nd-7th anterior ribs and left 5th and 6th anterior ribs.  dilated and stool-filled rectosigmoid  small R pleural effusion and secretions in RLL and RUL    ECG: sinus tachycardia     ED COURSE  -neurosurgery evaluated the patient for CTH findings and recommended admission for neurology eval

## 2025-03-25 NOTE — PROGRESS NOTE ADULT - SUBJECTIVE AND OBJECTIVE BOX
SUBJECTIVE/OVERNIGHT EVENTS  Today is hospital day . This morning patient was seen and examined at bedside, resting comfortably in bed. No acute or major events overnight. Patient cooperative, follows simple commands, speaking with daughter in law, minimal words    Daughter in law bedside states that patient fell 2 weeks ago (unsure etiology if mechanical) since fall, patient has decreased ambulation and talking.         MEDICATIONS  STANDING MEDICATIONS  amLODIPine   Tablet 2.5 milliGRAM(s) Oral daily  atorvastatin 40 milliGRAM(s) Oral at bedtime  azithromycin  IVPB      cefTRIAXone   IVPB      lactated ringers. 1000 milliLiter(s) IV Continuous <Continuous>  pantoprazole    Tablet 40 milliGRAM(s) Oral before breakfast    PRN MEDICATIONS  acetaminophen     Tablet .. 650 milliGRAM(s) Oral every 6 hours PRN  aluminum hydroxide/magnesium hydroxide/simethicone Suspension 30 milliLiter(s) Oral every 4 hours PRN  bisacodyl Suppository 10 milliGRAM(s) Rectal daily PRN  melatonin 3 milliGRAM(s) Oral at bedtime PRN    VITALS  T(F): 97.6 (25 @ 04:44), Max: 97.9 (25 @ 12:40)  HR: 101 (25 @ 04:44) (100 - 104)  BP: 105/60 (25 @ 04:44) (105/57 - 121/53)  RR: 19 (25 @ 02:41) (18 - 20)  SpO2: 99% (25 @ 04:44) (92% - 100%)  POCT Blood Glucose.: 90 mg/dL (25 @ 21:36)      PHYSICAL EXAM:  GENERAL: NAD, well-groomed, well-developed  HEAD:  Atraumatic, Normocephalic  EYES: EOMI, PERRLA, conjunctiva and sclera clear  ENMT:  Moist mucous membranes  NECK: Supple, No JVD,  CHEST/LUNG: Clear to auscultation bilaterally  HEART: Regular rate and rhythm  ABDOMEN: Soft, Nontender, Nondistended; Bowel sounds present  NEURO:  MENTAL STATUS: AAOx0-1  LANG/SPEECH: Fluent,  CRANIAL NERVES:  II: Pupils equal and reactive, no RAPD, normal visual field and fundus  III, IV, VI: EOM intact, no gaze preference or deviation  V: normal  VII: no facial asymmetry  VIII: normal hearing to speech  MOTOR: no drift UE or LE   REFLEXES:  SENSORY: Normal to touch  COORD:   Gait:   EXTREMITIES: No LE edema, no calf tenderness  LYMPH: No lymphadenopathy noted  SKIN: No rashes or lesions         LABS             7.9    12.37 )-----------( 413      ( 25 @ 05:34 )             25.0     136  |  102  |  102  -------------------------<  114   25 @ 05:34  4.8  |  19  |  3.4    Ca      8.2     25 @ 05:34  Phos   5.4     25 @ 05:34  Mg     2.5     25 @ 05:34    TPro  5.9  /  Alb  2.7  /  TBili  0.3  /  DBili  x   /  AST  16  /  ALT  7   /  AlkPhos  120  /  GGT  x     25 @ 05:34      Troponin T, High Sensitivity Result: 174 ng/L (25 @ 16:07)  Troponin T, High Sensitivity Result: 189 ng/L (25 @ 14:35)    Urinalysis Basic - ( 25 Mar 2025 10:15 )    Color: Yellow / Appearance: Turbid / S.019 / pH: x  Gluc: x / Ketone: Trace mg/dL  / Bili: Negative / Urobili: 0.2 mg/dL   Blood: x / Protein: 30 mg/dL / Nitrite: Negative   Leuk Esterase: Negative / RBC: 0 /HPF / WBC 2 /HPF   Sq Epi: x / Non Sq Epi: 6 /HPF / Bacteria: Negative /HPF          Urinalysis with Rflx Culture (collected 24 Mar 2025 13:02)      IMAGING      ASSESSMENT AND PLAN:

## 2025-03-25 NOTE — CHART NOTE - NSCHARTNOTEFT_GEN_A_CORE
Trauma tertiary Survey      Patient seen and examined. No complaints at this time.     PHYSICAL EXAM:  Craniofacial: Atraumatic, No deformity  Eyes: Pupil Size equal B/L and RTL. EOMI  Oropharynx: Atraumatic  Neck: Non-tender, No deformity, Trachea midline.  Chest: Equal breath sounds, wet cough noted  Abdomen: Atraumatic, Non-tender, non-distended.  Pelvis: Stable, non-tender, no deformity  Back: Spine non-tender, Atraumatic  Extremities: Atraumatic, no deformities, moving all extremities b/l equal strength. R forearm wrapped with kerlix  Neurologic: CN intact, Motor intact throughout. Sensation intact/normal throughout.  Psych: Mood and affect normal.        All images/reports reviewed. No further traumatic work-up warranted.    < from: Xray Chest 1 View- PORTABLE-Urgent (Xray Chest 1 View- PORTABLE-Urgent .) (03.24.25 @ 13:35) >    IMPRESSION:    Right basilar opacity/effusion.    < end of copied text >    < from: Xray Pelvis AP only (03.24.25 @ 13:35) >    impression:    Bones are osteopenic. Degenerative changes. No acute fracture. Moderate   fecal retention within the rectum. Vascular calcifications are noted.    < end of copied text >    < from: CT Head No Cont (03.24.25 @ 14:01) >    CT HEAD:  New small patchy hyperdensity in the left thalamus since the prior CT   head from 7/5/2024 could potentially represent intraparenchymal   hemorrhage. Recommend short-term follow-up.    CT CERVICAL SPINE:  No acute cervical fracture or facet subluxation.    Small right pleural effusion.    < end of copied text >    < from: CT Chest No Cont (03.24.25 @ 16:03) >    IMPRESSION:    1.  Mildly displaced acute or subacute fractures of the right 2nd-7th   anterior ribs and left 5th and 6th anterior ribs.  2. No definite evidence of acute traumatic injury within the abdomen or   pelvis.  3. Dilated and stool-filled rectosigmoid colon.  4. Small right pleural effusion and filling defect/secretions within the   right lower lobe segmental and subsegmental bronchi, with postobstructive   consolidation within the right lower lobe and patchy opacities within the   right upper lobe, possibly reflecting pneumonia in the appropriate   clinical setting. Follow-up to resolution is suggested.    < end of copied text >    < from: CT Abdomen and Pelvis No Cont (03.24.25 @ 16:03) >      IMPRESSION:    1.  Mildly displaced acute or subacute fractures of the right 2nd-7th   anterior ribs and left 5th and 6th anterior ribs.  2. No definite evidence of acute traumatic injury within the abdomen or   pelvis.  3. Dilated and stool-filled rectosigmoid colon.  4. Small right pleural effusion and filling defect/secretions within the   right lower lobe segmental and subsegmental bronchi, with postobstructive   consolidation within the right lower lobe and patchy opacities within the   right upper lobe, possibly reflecting pneumonia in the appropriate   clinical setting. Follow-up to resolution is suggested.    < end of copied text >    < from: CT Head No Cont (03.24.25 @ 22:37) >    IMPRESSION:    Since CT head 3/24/2025 at 1:57 PM:    Redemonstrated 0.7 cm patchy hyperdensity in the left thalamus, which may   reflect intraparenchymal hemorrhage. There has been no increase in the   area of hyperdensity.    < end of copied text >

## 2025-03-25 NOTE — CONSULT NOTE ADULT - SUBJECTIVE AND OBJECTIVE BOX
TRAUMA ACTIVATION LEVEL:  CODE / ALERT  / CONSULT  ACTIVATED BY: EMS**  /  ED  INTUBATED: YES** / NO      MECHANISM OF INJURY:   [] Blunt     [] MVC	  [X] Fall	  [] Pedestrian Struck	  [] Motorcycle     [] Assault     [] Bicycle collision    [] Sports injury    [] Penetrating    [] Gun Shot Wound      [] Stab Wound    GCS: 12 	E: 4	V: 2	M: 6    HPI:    89yF w/ no significant PMHx presents to the ED s/p Fall three weeks ago ?HT, ?LOC, -AC. Pt daughter reports the patient fell three weeks ago while trying to get up from her bed to get to her walker across the room. She then crawled to the bed but was unable to get herself up. The pt lives at home alone with home nursing care who notified the daughter that her mother had fallen. Since the fall the pt has been non-verbal, decreased PO intake, and has spent more time in bed since the fall. Pt daughter reports home health had obtained CXR, Pelvis xray, and right forearm xray,  Initially the Pt did not want to come to the hospital however today the pt was agreeable when her daughter asked if shed would come to the hospital. During this time the patient developed a cough and was started on amoxicillin and finished her course.     PAST MEDICAL & SURGICAL HISTORY:  No pertinent past medical history          Allergies    No Known Allergies    Intolerances        Home Medications:  ferrous fumarate 324 mg (106 mg elemental iron) oral tablet: 1 tab(s) orally once a day (25 Mar 2025 00:49)      ROS: 10-system review is otherwise negative except HPI above.      Primary Survey:    A - airway intact  B - bilateral breath sounds and good chest rise  C - palpable pulses in all extremities  D - GCS 15 on arrival, BANKS  Exposure obtained    Vital Signs Last 24 Hrs  T(C): 36.5 (24 Mar 2025 22:00), Max: 36.6 (24 Mar 2025 12:40)  T(F): 97.7 (24 Mar 2025 22:00), Max: 97.9 (24 Mar 2025 12:40)  HR: 100 (24 Mar 2025 22:50) (100 - 104)  BP: 121/53 (24 Mar 2025 22:50) (105/57 - 121/53)  BP(mean): --  RR: 20 (24 Mar 2025 22:50) (18 - 20)  SpO2: 100% (24 Mar 2025 22:50) (95% - 100%)    Parameters below as of 24 Mar 2025 22:50  Patient On (Oxygen Delivery Method): nasal cannula  O2 Flow (L/min): 2      Secondary Survey:   General: NAD  HEENT: Normocephalic, atraumatic, EOMI, PEERLA. no scalp lacerations   Neck: Soft, midline trachea. no c-spine tenderness  Chest: No chest wall tenderness, no subcutaneous emphysema   Cardiac: S1, S2, RRR  Respiratory: Bilateral breath sounds, clear and equal bilaterally  Abdomen: Soft, non-distended, non-tender, no rebound, no guarding.  Groin: Normal appearing, pelvis stable   Ext:  Moving b/l upper and lower extremities. Palpable Radial b/l UE, b/l DP palpable in LE.   Back: No T/L/S spine tenderness, No palpable runoff/stepoff/deformity  Rectal: No carlos blood, DEXTER with good tone    FAST: *****/Negative    ACCESS / DEVICES:  [ ] Peripheral IV  [ ] Central Venous Line	[ ] R	[ ] L	[ ] IJ	[ ] Fem	[ ] SC	Placed:   [ ] Arterial Line		[ ] R	[ ] L	[ ] Fem	[ ] Rad	[ ] Ax	Placed:   [ ] PICC:					[ ] Mediport  [ ] Urinary Catheter,  Date Placed:   [ ] Chest tube: [ ] Right, [ ] Left  [ ] NAI/Luis Enrique Drains    Labs:  CAPILLARY BLOOD GLUCOSE      POCT Blood Glucose.: 90 mg/dL (24 Mar 2025 21:36)                          9.7    12.98 )-----------( 541      ( 24 Mar 2025 13:32 )             30.4       Auto Immature Granulocyte %: 0.8 % (03-24-25 @ 13:32)    03-24    136  |  99  |  113[HH]  ----------------------------<  91  5.3[H]   |  19  |  3.7[H]      Calcium: 8.8 mg/dL (03-24-25 @ 13:32)      LFTs:             6.9  | 0.4  | 22       ------------------[146     ( 24 Mar 2025 13:32 )  3.2  | x    | 9           Lipase:x      Amylase:x         Blood Gas Venous - Lactate: 2.6 mmol/L (03-24-25 @ 14:54)      Coags:            Urinalysis Basic - ( 24 Mar 2025 13:32 )    Color: x / Appearance: x / SG: x / pH: x  Gluc: 91 mg/dL / Ketone: x  / Bili: x / Urobili: x   Blood: x / Protein: x / Nitrite: x   Leuk Esterase: x / RBC: x / WBC x   Sq Epi: x / Non Sq Epi: x / Bacteria: x        Urinalysis with Rflx Culture (collected 24 Mar 2025 13:02)              RADIOLOGY & ADDITIONAL STUDIES:  ---------------------------------------------------------------------------------------    ASSESSMENT:  89y Female  w/ PMHx of *** seen as (Code Trauma / Trauma Alert / Trauma Consult) s/p ****** with complaint of *** , external signs of trauma include *** . Trauma assessment in ED: ABCs intact , GCS 15 , AAOx3,  BANKS.     Injuries identified:   -   -   -     PLAN:   - Trauma Labs: (CBC, BMP, Coags, T&S, UA, EtOH level)  Additional studies:  EKG  Utox    Trauma Imaging to include the following:  - CXR, Pelvic Xray  - CT Head,  CT C-spine, CT Max/Face, CT Chest, CT Abd/Pelvis  - Extremity films: None    Additional consultations:  - Neurosurgery  - Orthopedics  - OMFS  - PT/Rehab/SW  - Hospitalist/Medicine     Disposition pending results of above labs and imaging  Above plan discussed with Trauma attending,  ***  , patient, patient family, and ED team  --------------------------------------------------------------------------------------  03-25-25 @ 00:51

## 2025-03-25 NOTE — PROGRESS NOTE ADULT - ASSESSMENT
Ms. Moncada is an 89-year-old Lao-speaking female PMH HTN, CKD baseline Cr 1.9 admitted for subacute IPH s/p fall 2 weeks ago    #Subacute Intraparenchymal Hemorrhage possible HTN etiology  #s/p Fall ? unsure etiology mechanical vs cardiogenic  -CTH: L thalamas hyperdensity consistent with IPH  -CXR: Rib fractures  -continue home amlodipine 2.5 mg qd, maintain SBP < 140  -cardiac monitoring  -hold home ASA and DVT ppx for now   -HOB > 45 degrees  -f/u neurology eval: pending MRI Brain with and without contrast  -Trauma team survery today       #Multiple Rib Fractures s/p fall   -Mildly displaced acute or subacute fractures of the right 2nd-7th   anterior ribs and left 5th and 6th anterior ribs.  - No definite evidence of acute traumatic injury within the abdomen or   pelvis.  -Trauma team: survey today 3/25    #Aspiration PNA/Pneumonitis  #Small Pleural Effusion  -CXR: Small right pleural effusion and filling defect/secretions within the   right lower lobe segmental and subsegmental bronchi, with postobstructive   consolidation within the right lower lobe and patchy opacities within the   right upper lobe, possibly reflecting pneumonia in the appropriate   clinical setting.   -c/w ceftriaxone + azithromycin  -procal, BCx, U strep & legionella   -aspiration precautions, S&S eval    #ZEKE on CKD    -Cr 3.7 from baseline 1.9 likely prerenal due to decreased po intake after fall  -250 cc bolus LR, LR @ 75 for 24 hours  -f/u KBUS  -f/u U studies    #Stool [place]  -noted on CT  -dulcolax suppository prn  -Miralax + senna once cleared by S&S     DVT prophylaxis: hold for IPH  GI prophylaxis: PPI   Diet: puree and mildly thickened as per speech and swallow  Code status: full code   Activity: Bedrest for now   Dispo: floor

## 2025-03-25 NOTE — ADVANCED PRACTICE NURSE CONSULT - RECOMMEDATIONS
Cleanse wound with Vashe, pat dry.  Apply Medihoney and xeroform twice daily PRN for soiling - cover with abdominal pad.   Skin and incontinence care.  Pressure Injury Prevention.  Assess wound daily and inform primary provider of any changes.   Case discussed with primary RN.  Wound/ ostomy specialist to f/u as needed.   Other recommendations per Primary Team.

## 2025-03-26 LAB
ANION GAP SERPL CALC-SCNC: 16 MMOL/L — HIGH (ref 7–14)
BASOPHILS # BLD AUTO: 0.06 K/UL — SIGNIFICANT CHANGE UP (ref 0–0.2)
BASOPHILS NFR BLD AUTO: 0.5 % — SIGNIFICANT CHANGE UP (ref 0–1)
BUN SERPL-MCNC: 60 MG/DL — HIGH (ref 10–20)
CALCIUM SERPL-MCNC: 7.3 MG/DL — LOW (ref 8.4–10.5)
CHLORIDE SERPL-SCNC: 104 MMOL/L — SIGNIFICANT CHANGE UP (ref 98–110)
CO2 SERPL-SCNC: 14 MMOL/L — LOW (ref 17–32)
CREAT SERPL-MCNC: 1.9 MG/DL — HIGH (ref 0.7–1.5)
EGFR: 25 ML/MIN/1.73M2 — LOW
EGFR: 25 ML/MIN/1.73M2 — LOW
EOSINOPHIL # BLD AUTO: 0.18 K/UL — SIGNIFICANT CHANGE UP (ref 0–0.7)
EOSINOPHIL NFR BLD AUTO: 1.6 % — SIGNIFICANT CHANGE UP (ref 0–8)
GLUCOSE SERPL-MCNC: 58 MG/DL — LOW (ref 70–99)
HCT VFR BLD CALC: 19.5 % — LOW (ref 37–47)
HCT VFR BLD CALC: 26 % — LOW (ref 37–47)
HGB BLD-MCNC: 6.1 G/DL — CRITICAL LOW (ref 12–16)
HGB BLD-MCNC: 8.4 G/DL — LOW (ref 12–16)
IMM GRANULOCYTES NFR BLD AUTO: 0.8 % — HIGH (ref 0.1–0.3)
LEGIONELLA AG UR QL: NEGATIVE — SIGNIFICANT CHANGE UP
LYMPHOCYTES # BLD AUTO: 0.9 K/UL — LOW (ref 1.2–3.4)
LYMPHOCYTES # BLD AUTO: 7.9 % — LOW (ref 20.5–51.1)
MCHC RBC-ENTMCNC: 29.2 PG — SIGNIFICANT CHANGE UP (ref 27–31)
MCHC RBC-ENTMCNC: 29.6 PG — SIGNIFICANT CHANGE UP (ref 27–31)
MCHC RBC-ENTMCNC: 31.3 G/DL — LOW (ref 32–37)
MCHC RBC-ENTMCNC: 32.3 G/DL — SIGNIFICANT CHANGE UP (ref 32–37)
MCV RBC AUTO: 91.5 FL — SIGNIFICANT CHANGE UP (ref 81–99)
MCV RBC AUTO: 93.3 FL — SIGNIFICANT CHANGE UP (ref 81–99)
MONOCYTES # BLD AUTO: 1.19 K/UL — HIGH (ref 0.1–0.6)
MONOCYTES NFR BLD AUTO: 10.5 % — HIGH (ref 1.7–9.3)
NEUTROPHILS # BLD AUTO: 8.91 K/UL — HIGH (ref 1.4–6.5)
NEUTROPHILS NFR BLD AUTO: 78.7 % — HIGH (ref 42.2–75.2)
NRBC BLD AUTO-RTO: 0 /100 WBCS — SIGNIFICANT CHANGE UP (ref 0–0)
NRBC BLD AUTO-RTO: 0 /100 WBCS — SIGNIFICANT CHANGE UP (ref 0–0)
PLATELET # BLD AUTO: 331 K/UL — SIGNIFICANT CHANGE UP (ref 130–400)
PLATELET # BLD AUTO: 400 K/UL — SIGNIFICANT CHANGE UP (ref 130–400)
PMV BLD: 8.9 FL — SIGNIFICANT CHANGE UP (ref 7.4–10.4)
PMV BLD: 9 FL — SIGNIFICANT CHANGE UP (ref 7.4–10.4)
POTASSIUM SERPL-MCNC: 4.4 MMOL/L — SIGNIFICANT CHANGE UP (ref 3.5–5)
POTASSIUM SERPL-SCNC: 4.4 MMOL/L — SIGNIFICANT CHANGE UP (ref 3.5–5)
RBC # BLD: 2.09 M/UL — LOW (ref 4.2–5.4)
RBC # BLD: 2.84 M/UL — LOW (ref 4.2–5.4)
RBC # FLD: 15.2 % — HIGH (ref 11.5–14.5)
RBC # FLD: 15.3 % — HIGH (ref 11.5–14.5)
SODIUM SERPL-SCNC: 134 MMOL/L — LOW (ref 135–146)
WBC # BLD: 11.33 K/UL — HIGH (ref 4.8–10.8)
WBC # BLD: 9.24 K/UL — SIGNIFICANT CHANGE UP (ref 4.8–10.8)
WBC # FLD AUTO: 11.33 K/UL — HIGH (ref 4.8–10.8)
WBC # FLD AUTO: 9.24 K/UL — SIGNIFICANT CHANGE UP (ref 4.8–10.8)

## 2025-03-26 PROCEDURE — 99233 SBSQ HOSP IP/OBS HIGH 50: CPT

## 2025-03-26 PROCEDURE — 70551 MRI BRAIN STEM W/O DYE: CPT | Mod: 26

## 2025-03-26 RX ADMIN — Medication 250 MILLIGRAM(S): at 01:40

## 2025-03-26 RX ADMIN — Medication 650 MILLIGRAM(S): at 16:01

## 2025-03-26 RX ADMIN — Medication 1 APPLICATION(S): at 05:34

## 2025-03-26 RX ADMIN — CEFTRIAXONE 100 MILLIGRAM(S): 500 INJECTION, POWDER, FOR SOLUTION INTRAMUSCULAR; INTRAVENOUS at 01:40

## 2025-03-26 RX ADMIN — Medication 650 MILLIGRAM(S): at 15:01

## 2025-03-26 NOTE — PROGRESS NOTE ADULT - ASSESSMENT
Ms. Moncada is an 89-year-old Lithuanian-speaking female PMH HTN, CKD baseline Cr 1.9 admitted for subacute IPH s/p fall 2 weeks ago    #Subacute Intraparenchymal Hemorrhage possible HTN etiology  #s/p Fall ? unsure etiology mechanical vs cardiogenic  -CTH: L thalamus hyperdense consistent with IPH  -CXR: Rib fractures  -continue home amlodipine 2.5 mg qd, maintain SBP < 140  -hold home ASA and DVT ppx for now   -MRI Brain with and without contrast:   There is no evidence of acute infarct, intracranial hemorrhage, mass   effect or midline shift.  There is generalized cortical volume loss with commensurate ventricular   dilation. There is no hydrocephalus.  There is confluent periventricular as well as subcortical white matter T2   signal hyperintensity consistent with severe chronic microvascular type   changes. There is a chronic right basal ganglia lacunar infarct. There   are multiple foci of susceptibility artifact within the bilateral thalami   and basal ganglia consistent with chronic hemorrhages.  -Trauma team survey 3/25     #Acute Normocytic Anemia  -Hg 7.9 -> 6.1 repeat at 11 am was 8.4   -No active signs of bleeding  -Pt on nasal cannula overnight  -continue to monitor , keep active type and screen    #Multiple Rib Fractures s/p fall   -Mildly displaced acute or subacute fractures of the right 2nd-7th   anterior ribs and left 5th and 6th anterior ribs.  - No definite evidence of acute traumatic injury within the abdomen or   pelvis.  -Trauma team: survey today 3/25  -APS    #Aspiration PNA/Pneumonitis  #Small Pleural Effusion  -CXR: Small right pleural effusion and filling defect/secretions within the   right lower lobe segmental and subsegmental bronchi, with postobstructive   consolidation within the right lower lobe and patchy opacities within the   right upper lobe, possibly reflecting pneumonia in the appropriate   clinical setting.   -c/w ceftriaxone + azithromycin  -procal, BCx, U strep & legionella   -aspiration precautions, S&S eval    #ZEKE on CKD    -Cr 3.7 from baseline 1.9 likely prerenal due to decreased po intake after fall  -250 cc bolus LR, LR @ 75 for 24 hours    #Stool [place]  -noted on CT  -dulcolax suppository prn  -Miralax + senna once cleared by S&S     DVT prophylaxis: hold for IPH  GI prophylaxis: PPI   Diet: puree and mildly thickened as per speech and swallow  Code status: full code   Activity: Bedrest for now   Dispo: floor    Pending: improved PO intake/ stable Hb / PT  Plan dw daughter in law.   Dispo: TBD

## 2025-03-26 NOTE — SWALLOW BEDSIDE ASSESSMENT ADULT - PHARYNGEAL PHASE
weak non productive cough across all PO trials/Cough post oral intake/Delayed cough post oral intake

## 2025-03-26 NOTE — PROGRESS NOTE ADULT - SUBJECTIVE AND OBJECTIVE BOX
JAROD KORIN  89y  Female      Patient is a 89y old  Female who presents with a chief complaint of fall and poor po intake     INTERVAL HPI/OVERNIGHT EVENTS:      ******************************* REVIEW OF SYSTEMS:**********************************************    All other review of systems negative    *********************** VITALS ******************************************    T(F): 98.5 (03-26-25 @ 13:30)  HR: 102 (03-26-25 @ 13:30) (100 - 116)  BP: 114/65 (03-26-25 @ 13:30) (108/61 - 117/66)  RR: 18 (03-26-25 @ 13:30) (16 - 18)  SpO2: 93% (03-26-25 @ 05:00) (93% - 97%)    03-25-25 @ 07:01  -  03-26-25 @ 07:00  --------------------------------------------------------  IN: 0 mL / OUT: 200 mL / NET: -200 mL            03-25-25 @ 07:01  -  03-26-25 @ 07:00  --------------------------------------------------------  IN: 0 mL / OUT: 200 mL / NET: -200 mL        ******************************** PHYSICAL EXAM:**************************************************  GENERAL: NAD    PSYCH: no agitation, HEENT:     NERVOUS SYSTEM:  Alert & Oriented X2-3,   PULMONARY: OCTAVIO, CTA    CARDIOVASCULAR: S1S2 RRR    GI: Soft, NT, ND; BS present.    EXTREMITIES:  2+ Peripheral Pulses, No clubbing, cyanosis, or edema    LYMPH: No lymphadenopathy noted    SKIN: No rashes or lesions      **************************** LABS *******************************************************                          8.4    11.33 )-----------( 400      ( 26 Mar 2025 11:06 )             26.0     03-26    134[L]  |  104  |  60[H]  ----------------------------<  58[L]  4.4   |  14[L]  |  1.9[H]    Ca    7.3[L]      26 Mar 2025 06:41  Phos  5.4     03-25  Mg     2.5     03-25    TPro  5.9[L]  /  Alb  2.7[L]  /  TBili  0.3  /  DBili  x   /  AST  16  /  ALT  7   /  AlkPhos  120[H]  03-25      Urinalysis Basic - ( 26 Mar 2025 06:41 )    Color: x / Appearance: x / SG: x / pH: x  Gluc: 58 mg/dL / Ketone: x  / Bili: x / Urobili: x   Blood: x / Protein: x / Nitrite: x   Leuk Esterase: x / RBC: x / WBC x   Sq Epi: x / Non Sq Epi: x / Bacteria: x        Lactate Trend        CAPILLARY BLOOD GLUCOSE      POCT Blood Glucose.: 90 mg/dL (24 Mar 2025 21:36)          **************************Active Medications *******************************************  No Known Allergies      acetaminophen     Tablet .. 650 milliGRAM(s) Oral every 6 hours PRN  aluminum hydroxide/magnesium hydroxide/simethicone Suspension 30 milliLiter(s) Oral every 4 hours PRN  amLODIPine   Tablet 2.5 milliGRAM(s) Oral daily  atorvastatin 40 milliGRAM(s) Oral at bedtime  azithromycin  IVPB      azithromycin  IVPB 500 milliGRAM(s) IV Intermittent every 24 hours  bisacodyl Suppository 10 milliGRAM(s) Rectal daily PRN  cefTRIAXone   IVPB      cefTRIAXone   IVPB 1000 milliGRAM(s) IV Intermittent every 24 hours  chlorhexidine 2% Cloths 1 Application(s) Topical <User Schedule>  lactated ringers. 1000 milliLiter(s) IV Continuous <Continuous>  melatonin 3 milliGRAM(s) Oral at bedtime PRN  pantoprazole    Tablet 40 milliGRAM(s) Oral before breakfast      ***************************************************  RADIOLOGY & ADDITIONAL TESTS:    Imaging Personally Reviewed:  [ ] YES  [ ] NO    HEALTH ISSUES - PROBLEM Dx:

## 2025-03-26 NOTE — SWALLOW BEDSIDE ASSESSMENT ADULT - CONSISTENCIES ADMINISTERED
Behavioral Health Belongings     Informed of Valuables Policy  Yes No         Item                  Qty   Clothing:     Home   Bin   Security Lock Up Room Returned   Date/Initials    Pants with strings         Shirts with strings         Shoes / boots with strings         Coats / jackets with strings         Other                               Behavioral Health Belongings List                     [id number]                          Qty       Luggage / Bags:   Home   Bin   Security Lock Up Room Returned Date/Initials    Suitcases / Luggage with straps         Gym Bags / purses with long straps         Backpack         Plastic bags (including Ziploc)         Other                     Qty   Grooming / Hygiene Items:   Home   Bin   Security Lock Up Room Returned Date/Initials    Perfume / [place]         Mouthwash         Items in glass bottles (nail polish, makeup, etc.)         Aerosol cans (hair spray, mousse, etc         Electric razor         Disposable razor         Shaving cream         Dental floss         Metal files, nail clippers, tweezers, etc.         Hairdryer         Curling iron         Other                                                             Behavioral Health Belongings List                            [id number]                               Qty   Electronic Devices:   Home   Bin   Security Lock Up Room Returned Date/Initials    Cell phone, iPhone, Blackberry, etc.         iPod / Media player         Chargers / cords         Headphones         Other                              Qty   Smoking Items:   Home   Bin   Security Lock Up Room Returned Date/Initials    Cigarettes, cigars         Lighters         Matches         Loose tobacco for rolling cigarettes, Chew, Pipe tobacco         Tobacco pipes         Rolling Papers         Other                                                  Behavioral Health Belongings List                          [id number]                                                    .                                .            Qty   Cards, Keys, Valuables:   Home   Bin   Security Lock Up Room Returned Date/Initials    Keys         Wallet / Purse         Cash (Encourage  to home or Loss Prevention)         Credit Cards (Encourage home or Loss Prevention)         Checkbook (Encourage home or Loss Prevention)         Jewelry - Specify         Other                              Qty   Other / Various Sharps:   Home   Bin   Security Lock Up Room Returned Date/Initials    Brace         Cane         Contacts         Dentures U / L         Hearing Aide L / R         Partials / Retainer         Prothesis         Scooter         Walker         W/C         Medication         Other                                                                             Behavioral Health Belongings List                            [id number]                           Qty   Other Misc Items   Home   Bin   Security Lock Up Room Returned Date/Initials                                                                    Signature at Admission ___________________________________________Date: _________    Signature at Update ______________________________________________Date: _________    Signature at Discharge ____________________________________________Date: _________                                                                    Behavioral Health Belongings List                       [id number]            mildly thick/pureed

## 2025-03-26 NOTE — SWALLOW BEDSIDE ASSESSMENT ADULT - ASR SWALLOW RECOMMEND DIAG
To further investigate swallow function/integrity/VFSS/MBS
To further investigate swallow function/integrity/VFSS/MBS

## 2025-03-26 NOTE — PROGRESS NOTE ADULT - SUBJECTIVE AND OBJECTIVE BOX
SUBJECTIVE/OVERNIGHT EVENTS  Today is hospital day 1d. This morning patient was seen and examined at bedside, resting comfortably in bed. Patient Hg dropped to 6.1 this am. 2 units blood ordered        MEDICATIONS  STANDING MEDICATIONS  amLODIPine   Tablet 2.5 milliGRAM(s) Oral daily  atorvastatin 40 milliGRAM(s) Oral at bedtime  azithromycin  IVPB      azithromycin  IVPB 500 milliGRAM(s) IV Intermittent every 24 hours  cefTRIAXone   IVPB      cefTRIAXone   IVPB 1000 milliGRAM(s) IV Intermittent every 24 hours  chlorhexidine 2% Cloths 1 Application(s) Topical <User Schedule>  lactated ringers. 1000 milliLiter(s) IV Continuous <Continuous>  pantoprazole    Tablet 40 milliGRAM(s) Oral before breakfast    PRN MEDICATIONS  acetaminophen     Tablet .. 650 milliGRAM(s) Oral every 6 hours PRN  aluminum hydroxide/magnesium hydroxide/simethicone Suspension 30 milliLiter(s) Oral every 4 hours PRN  bisacodyl Suppository 10 milliGRAM(s) Rectal daily PRN  melatonin 3 milliGRAM(s) Oral at bedtime PRN    VITALS  T(F): 99 (03-26-25 @ 05:00), Max: 99 (03-26-25 @ 05:00)  HR: 100 (03-26-25 @ 05:00) (100 - 116)  BP: 108/61 (03-26-25 @ 05:00) (108/61 - 117/66)  RR: 16 (03-26-25 @ 05:00) (16 - 18)  SpO2: 93% (03-26-25 @ 05:00) (93% - 97%)      PHYSICAL EXAM:  GENERAL: NAD  HEAD:  Atraumatic, Normocephalic  EYES: EOMI, PERRLA, conjunctiva and sclera clear  ENMT:  Moist mucous membranes  NECK: Supple  CHEST/LUNG: Clear to auscultation bilaterally  HEART: Regular rate and rhythm  ABDOMEN: Soft, Nontender, Nondistended; Bowel sounds present  NEURO:  MENTAL STATUS: AAOx0  EXTREMITIES: No LE edema, no calf tenderness  LYMPH: No lymphadenopathy noted  SKIN: No rashes or lesions         LABS             6.1    9.24  )-----------( 331      ( 03-26-25 @ 06:41 )             19.5     134  |  104  |  60  -------------------------<  58   03-26-25 @ 06:41  4.4  |  14  |  1.9    Ca      7.3     03-26-25 @ 06:41  Phos   5.4     03-25-25 @ 05:34  Mg     2.5     03-25-25 @ 05:34    TPro  5.9  /  Alb  2.7  /  TBili  0.3  /  DBili  x   /  AST  16  /  ALT  7   /  AlkPhos  120  /  GGT  x     03-25-25 @ 05:34      Troponin T, High Sensitivity Result: 174 ng/L (03-24-25 @ 16:07)  Troponin T, High Sensitivity Result: 189 ng/L (03-24-25 @ 14:35)    Urinalysis Basic - ( 26 Mar 2025 06:41 )    Color: x / Appearance: x / SG: x / pH: x  Gluc: 58 mg/dL / Ketone: x  / Bili: x / Urobili: x   Blood: x / Protein: x / Nitrite: x   Leuk Esterase: x / RBC: x / WBC x   Sq Epi: x / Non Sq Epi: x / Bacteria: x          Culture - Blood (collected 24 Mar 2025 18:20)  Source: Blood Blood-Peripheral  Preliminary Report (25 Mar 2025 22:02):    No growth at 24 hours    Culture - Blood (collected 24 Mar 2025 18:20)  Source: Blood Blood-Peripheral  Preliminary Report (25 Mar 2025 22:02):    No growth at 24 hours    Urinalysis with Rflx Culture (collected 24 Mar 2025 13:02)    Culture - Urine (collected 24 Mar 2025 13:02)  Source: Clean Catch None  Final Report (26 Mar 2025 00:49):    No growth      IMAGING      ASSESSMENT AND PLAN:           SUBJECTIVE/OVERNIGHT EVENTS  Today is hospital day 1d. This morning patient was seen and examined at bedside, resting comfortably in bed. Patient Hg dropped to 6.1 this am. Repeat for 11am showed Hg 8.4        MEDICATIONS  STANDING MEDICATIONS  amLODIPine   Tablet 2.5 milliGRAM(s) Oral daily  atorvastatin 40 milliGRAM(s) Oral at bedtime  azithromycin  IVPB      azithromycin  IVPB 500 milliGRAM(s) IV Intermittent every 24 hours  cefTRIAXone   IVPB      cefTRIAXone   IVPB 1000 milliGRAM(s) IV Intermittent every 24 hours  chlorhexidine 2% Cloths 1 Application(s) Topical <User Schedule>  lactated ringers. 1000 milliLiter(s) IV Continuous <Continuous>  pantoprazole    Tablet 40 milliGRAM(s) Oral before breakfast    PRN MEDICATIONS  acetaminophen     Tablet .. 650 milliGRAM(s) Oral every 6 hours PRN  aluminum hydroxide/magnesium hydroxide/simethicone Suspension 30 milliLiter(s) Oral every 4 hours PRN  bisacodyl Suppository 10 milliGRAM(s) Rectal daily PRN  melatonin 3 milliGRAM(s) Oral at bedtime PRN    VITALS  T(F): 99 (03-26-25 @ 05:00), Max: 99 (03-26-25 @ 05:00)  HR: 100 (03-26-25 @ 05:00) (100 - 116)  BP: 108/61 (03-26-25 @ 05:00) (108/61 - 117/66)  RR: 16 (03-26-25 @ 05:00) (16 - 18)  SpO2: 93% (03-26-25 @ 05:00) (93% - 97%)      PHYSICAL EXAM:  GENERAL: NAD  HEAD:  Atraumatic, Normocephalic  EYES: EOMI, PERRLA, conjunctiva and sclera clear  ENMT:  Moist mucous membranes  NECK: Supple  CHEST/LUNG: Clear to auscultation bilaterally  HEART: Regular rate and rhythm  ABDOMEN: Soft, Nontender, Nondistended; Bowel sounds present  NEURO:  MENTAL STATUS: AAOx0  EXTREMITIES: No LE edema, no calf tenderness  LYMPH: No lymphadenopathy noted  SKIN: No rashes or lesions         LABS             6.1    9.24  )-----------( 331      ( 03-26-25 @ 06:41 )             19.5     134  |  104  |  60  -------------------------<  58   03-26-25 @ 06:41  4.4  |  14  |  1.9    Ca      7.3     03-26-25 @ 06:41  Phos   5.4     03-25-25 @ 05:34  Mg     2.5     03-25-25 @ 05:34    TPro  5.9  /  Alb  2.7  /  TBili  0.3  /  DBili  x   /  AST  16  /  ALT  7   /  AlkPhos  120  /  GGT  x     03-25-25 @ 05:34      Troponin T, High Sensitivity Result: 174 ng/L (03-24-25 @ 16:07)  Troponin T, High Sensitivity Result: 189 ng/L (03-24-25 @ 14:35)    Urinalysis Basic - ( 26 Mar 2025 06:41 )    Color: x / Appearance: x / SG: x / pH: x  Gluc: 58 mg/dL / Ketone: x  / Bili: x / Urobili: x   Blood: x / Protein: x / Nitrite: x   Leuk Esterase: x / RBC: x / WBC x   Sq Epi: x / Non Sq Epi: x / Bacteria: x          Culture - Blood (collected 24 Mar 2025 18:20)  Source: Blood Blood-Peripheral  Preliminary Report (25 Mar 2025 22:02):    No growth at 24 hours    Culture - Blood (collected 24 Mar 2025 18:20)  Source: Blood Blood-Peripheral  Preliminary Report (25 Mar 2025 22:02):    No growth at 24 hours    Urinalysis with Rflx Culture (collected 24 Mar 2025 13:02)    Culture - Urine (collected 24 Mar 2025 13:02)  Source: Clean Catch None  Final Report (26 Mar 2025 00:49):    No growth      IMAGING      ASSESSMENT AND PLAN:

## 2025-03-26 NOTE — SWALLOW BEDSIDE ASSESSMENT ADULT - SLP PERTINENT HISTORY OF CURRENT PROBLEM
Ms. Moncada is an 89-year-old Turkmen-speaking female PMH HTN, CKD(?) does not follow-up with any doctor presented to the ED for decreased p.o. intake, nonverbal status, bedbound last 2 weeks. Ms. Moncada is an 89-year-old Greek-speaking female PMH HTN, CKD(?) does not follow-up with any doctor presented to the ED for decreased p.o. intake, nonverbal status, bedbound last 2 weeks. MRI head showed a chronic right basal ganglia lacunar infarct.

## 2025-03-26 NOTE — PROGRESS NOTE ADULT - ASSESSMENT
Ms. Moncada is an 89-year-old Hebrew-speaking female PMH HTN, CKD baseline Cr 1.9 admitted for subacute IPH s/p fall 2 weeks ago    #Subacute Intraparenchymal Hemorrhage possible HTN etiology  #s/p Fall ? unsure etiology mechanical vs cardiogenic  -CTH: L thalamas hyperdensity consistent with IPH  -CXR: Rib fractures  -continue home amlodipine 2.5 mg qd, maintain SBP < 140  -cardiac monitoring  -hold home ASA and DVT ppx for now   -HOB > 45 degrees  -f/u neurology eval: pending MRI Brain with and without contrast pending read  -Trauma team survey 3/25     #Acute Normocytic Anemia  -Hg 7.9 -> 6.1  -No active signs of bleeding  -Pt on nasal cannula overnight  -2 units pRBC ordered    #Multiple Rib Fractures s/p fall   -Mildly displaced acute or subacute fractures of the right 2nd-7th   anterior ribs and left 5th and 6th anterior ribs.  - No definite evidence of acute traumatic injury within the abdomen or   pelvis.  -Trauma team: survey today 3/25    #Aspiration PNA/Pneumonitis  #Small Pleural Effusion  -CXR: Small right pleural effusion and filling defect/secretions within the   right lower lobe segmental and subsegmental bronchi, with postobstructive   consolidation within the right lower lobe and patchy opacities within the   right upper lobe, possibly reflecting pneumonia in the appropriate   clinical setting.   -c/w ceftriaxone + azithromycin  -procal, BCx, U strep & legionella   -aspiration precautions, S&S eval    #ZEKE on CKD    -Cr 3.7 from baseline 1.9 likely prerenal due to decreased po intake after fall  -250 cc bolus LR, LR @ 75 for 24 hours  -f/u KBUS  -f/u U studies    #Stool [place]  -noted on CT  -dulcolax suppository prn  -Miralax + senna once cleared by S&S     DVT prophylaxis: hold for IPH  GI prophylaxis: PPI   Diet: puree and mildly thickened as per speech and swallow  Code status: full code   Activity: Bedrest for now   Dispo: floor   Ms. Moncada is an 89-year-old Polish-speaking female PMH HTN, CKD baseline Cr 1.9 admitted for subacute IPH s/p fall 2 weeks ago    #Subacute Intraparenchymal Hemorrhage possible HTN etiology  #s/p Fall ? unsure etiology mechanical vs cardiogenic  -CTH: L thalamas hyperdensity consistent with IPH  -CXR: Rib fractures  -continue home amlodipine 2.5 mg qd, maintain SBP < 140  -cardiac monitoring  -hold home ASA and DVT ppx for now   -HOB > 45 degrees  - neurology eval:   -MRI Brain with and without contrast:   There is no evidence of acute infarct, intracranial hemorrhage, mass   effect or midline shift.  There is generalized cortical volume loss with commensurate ventricular   dilation. There is no hydrocephalus.  There is confluent periventricular as well as subcortical white matter T2   signal hyperintensity consistent with severe chronic microvascular type   changes. There is a chronic right basal ganglia lacunar infarct. There   are multiple foci of susceptibility artifact within the bilateral thalami   and basal ganglia consistent with chronic hemorrhages.  -Trauma team survey 3/25     #Acute Normocytic Anemia  -Hg 7.9 -> 6.1 repeat at 11 am was 8.4   -No active signs of bleeding  -Pt on nasal cannula overnight  -continue to monitor , keep active type and screen    #Multiple Rib Fractures s/p fall   -Mildly displaced acute or subacute fractures of the right 2nd-7th   anterior ribs and left 5th and 6th anterior ribs.  - No definite evidence of acute traumatic injury within the abdomen or   pelvis.  -Trauma team: survey today 3/25  -APS    #Aspiration PNA/Pneumonitis  #Small Pleural Effusion  -CXR: Small right pleural effusion and filling defect/secretions within the   right lower lobe segmental and subsegmental bronchi, with postobstructive   consolidation within the right lower lobe and patchy opacities within the   right upper lobe, possibly reflecting pneumonia in the appropriate   clinical setting.   -c/w ceftriaxone + azithromycin  -procal, BCx, U strep & legionella   -aspiration precautions, S&S eval    #ZEKE on CKD    -Cr 3.7 from baseline 1.9 likely prerenal due to decreased po intake after fall  -250 cc bolus LR, LR @ 75 for 24 hours  -f/u KBUS  -f/u U studies    #Stool [place]  -noted on CT  -dulcolax suppository prn  -Miralax + senna once cleared by S&S     DVT prophylaxis: hold for IPH  GI prophylaxis: PPI   Diet: puree and mildly thickened as per speech and swallow  Code status: full code   Activity: Bedrest for now   Dispo: floor

## 2025-03-27 ENCOUNTER — TRANSCRIPTION ENCOUNTER (OUTPATIENT)
Age: 89
End: 2025-03-27

## 2025-03-27 LAB
ANION GAP SERPL CALC-SCNC: 14 MMOL/L — SIGNIFICANT CHANGE UP (ref 7–14)
BASOPHILS # BLD AUTO: 0.07 K/UL — SIGNIFICANT CHANGE UP (ref 0–0.2)
BASOPHILS NFR BLD AUTO: 0.6 % — SIGNIFICANT CHANGE UP (ref 0–1)
BUN SERPL-MCNC: 70 MG/DL — CRITICAL HIGH (ref 10–20)
CALCIUM SERPL-MCNC: 8.3 MG/DL — LOW (ref 8.4–10.5)
CHLORIDE SERPL-SCNC: 103 MMOL/L — SIGNIFICANT CHANGE UP (ref 98–110)
CO2 SERPL-SCNC: 20 MMOL/L — SIGNIFICANT CHANGE UP (ref 17–32)
CREAT SERPL-MCNC: 2.1 MG/DL — HIGH (ref 0.7–1.5)
EGFR: 22 ML/MIN/1.73M2 — LOW
EGFR: 22 ML/MIN/1.73M2 — LOW
EOSINOPHIL # BLD AUTO: 0.04 K/UL — SIGNIFICANT CHANGE UP (ref 0–0.7)
EOSINOPHIL NFR BLD AUTO: 0.4 % — SIGNIFICANT CHANGE UP (ref 0–8)
GLUCOSE SERPL-MCNC: 79 MG/DL — SIGNIFICANT CHANGE UP (ref 70–99)
HCT VFR BLD CALC: 27.8 % — LOW (ref 37–47)
HGB BLD-MCNC: 8.7 G/DL — LOW (ref 12–16)
IMM GRANULOCYTES NFR BLD AUTO: 0.9 % — HIGH (ref 0.1–0.3)
LYMPHOCYTES # BLD AUTO: 0.7 K/UL — LOW (ref 1.2–3.4)
LYMPHOCYTES # BLD AUTO: 6.4 % — LOW (ref 20.5–51.1)
MCHC RBC-ENTMCNC: 28.9 PG — SIGNIFICANT CHANGE UP (ref 27–31)
MCHC RBC-ENTMCNC: 31.3 G/DL — LOW (ref 32–37)
MCV RBC AUTO: 92.4 FL — SIGNIFICANT CHANGE UP (ref 81–99)
MONOCYTES # BLD AUTO: 0.87 K/UL — HIGH (ref 0.1–0.6)
MONOCYTES NFR BLD AUTO: 8 % — SIGNIFICANT CHANGE UP (ref 1.7–9.3)
NEUTROPHILS # BLD AUTO: 9.12 K/UL — HIGH (ref 1.4–6.5)
NEUTROPHILS NFR BLD AUTO: 83.7 % — HIGH (ref 42.2–75.2)
NRBC BLD AUTO-RTO: 0 /100 WBCS — SIGNIFICANT CHANGE UP (ref 0–0)
PLATELET # BLD AUTO: 405 K/UL — HIGH (ref 130–400)
PMV BLD: 8.9 FL — SIGNIFICANT CHANGE UP (ref 7.4–10.4)
POTASSIUM SERPL-MCNC: 4.3 MMOL/L — SIGNIFICANT CHANGE UP (ref 3.5–5)
POTASSIUM SERPL-SCNC: 4.3 MMOL/L — SIGNIFICANT CHANGE UP (ref 3.5–5)
RBC # BLD: 3.01 M/UL — LOW (ref 4.2–5.4)
RBC # FLD: 15.4 % — HIGH (ref 11.5–14.5)
S PNEUM AG UR QL: NEGATIVE — SIGNIFICANT CHANGE UP
SODIUM SERPL-SCNC: 137 MMOL/L — SIGNIFICANT CHANGE UP (ref 135–146)
WBC # BLD: 10.9 K/UL — HIGH (ref 4.8–10.8)
WBC # FLD AUTO: 10.9 K/UL — HIGH (ref 4.8–10.8)

## 2025-03-27 PROCEDURE — 99232 SBSQ HOSP IP/OBS MODERATE 35: CPT

## 2025-03-27 PROCEDURE — 74230 X-RAY XM SWLNG FUNCJ C+: CPT | Mod: 26

## 2025-03-27 RX ORDER — SENNA 187 MG
2 TABLET ORAL AT BEDTIME
Refills: 0 | Status: DISCONTINUED | OUTPATIENT
Start: 2025-03-27 | End: 2025-04-03

## 2025-03-27 RX ORDER — SODIUM CHLORIDE 9 G/1000ML
1000 INJECTION, SOLUTION INTRAVENOUS
Refills: 0 | Status: DISCONTINUED | OUTPATIENT
Start: 2025-03-27 | End: 2025-04-01

## 2025-03-27 RX ORDER — MINERAL OIL
133 OIL (ML) MISCELLANEOUS ONCE
Refills: 0 | Status: COMPLETED | OUTPATIENT
Start: 2025-03-27 | End: 2025-03-27

## 2025-03-27 RX ORDER — POLYETHYLENE GLYCOL 3350 17 G/17G
17 POWDER, FOR SOLUTION ORAL EVERY 24 HOURS
Refills: 0 | Status: DISCONTINUED | OUTPATIENT
Start: 2025-03-27 | End: 2025-04-03

## 2025-03-27 RX ADMIN — Medication 133 MILLILITER(S): at 14:00

## 2025-03-27 RX ADMIN — Medication 3 MILLIGRAM(S): at 21:30

## 2025-03-27 RX ADMIN — Medication 250 MILLIGRAM(S): at 01:07

## 2025-03-27 RX ADMIN — Medication 650 MILLIGRAM(S): at 21:31

## 2025-03-27 RX ADMIN — Medication 1 APPLICATION(S): at 06:13

## 2025-03-27 RX ADMIN — ATORVASTATIN CALCIUM 40 MILLIGRAM(S): 80 TABLET, FILM COATED ORAL at 21:31

## 2025-03-27 RX ADMIN — Medication 2 TABLET(S): at 21:31

## 2025-03-27 RX ADMIN — CEFTRIAXONE 100 MILLIGRAM(S): 500 INJECTION, POWDER, FOR SOLUTION INTRAMUSCULAR; INTRAVENOUS at 01:07

## 2025-03-27 NOTE — PROGRESS NOTE ADULT - ASSESSMENT
Ms. Moncada is an 89-year-old Persian-speaking female PMH HTN, CKD baseline Cr 1.9 admitted for subacute IPH s/p fall 3 weeks ago    #Subacute Intraparenchymal Hemorrhage possible HTN etiology  #s/p Fall ? unsure etiology mechanical vs cardiogenic  -CTH: L thalamus hyperdense consistent with IPH  -CXR: Rib fractures  -continue home amlodipine 2.5 mg qd, maintain SBP < 140  -hold home ASA and DVT ppx for now   -MRI Brain with and without contrast:   There is no evidence of acute infarct, intracranial hemorrhage, mass   effect or midline shift.  There is generalized cortical volume loss with commensurate ventricular   dilation. There is no hydrocephalus.  There is confluent periventricular as well as subcortical white matter T2   signal hyperintensity consistent with severe chronic microvascular type   changes. There is a chronic right basal ganglia lacunar infarct. There   are multiple foci of susceptibility artifact within the bilateral thalami   and basal ganglia consistent with chronic hemorrhages.  -Trauma team survey 3/25     #Acute Normocytic Anemia  -Hg 7.9 -> 6.1 repeat at 11 am was 8.4   -No active signs of bleeding  -Pt on nasal cannula overnight  -continue to monitor , keep active type and screen    #Multiple Rib Fractures s/p fall   -Mildly displaced acute or subacute fractures of the right 2nd-7th   anterior ribs and left 5th and 6th anterior ribs.  - No definite evidence of acute traumatic injury within the abdomen or   pelvis.  -Trauma team: survey 3/25  -APS    #Aspiration PNA/Pneumonitis  #Small Pleural Effusion  -CXR: Small right pleural effusion and filling defect/secretions within the   right lower lobe segmental and subsegmental bronchi, with postobstructive   consolidation within the right lower lobe and patchy opacities within the   right upper lobe, possibly reflecting pneumonia in the appropriate   clinical setting.   -c/w ceftriaxone + azithromycin  -procal, BCx, U strep & legionella   -aspiration precautions, S&S eval  -barium esophogram done. f/u read by S&S    #ZEKE on CKD    -Cr 3.7 from baseline 1.9 likely prerenal due to decreased po intake after fall  -250 cc bolus LR, LR @ 75 for 24 hours    #Stool [place]  -noted on CT  -mineral oil enema    DVT prophylaxis: hold for IPH  GI prophylaxis: PPI   Diet: puree and mildly thickened as per speech and swallow  Code status: full code   Activity: Bedrest for now   Dispo: floor    Pending: improved PO intake/ stable Hb / PT  Plan dw daughter in law.   Dispo: TBD

## 2025-03-27 NOTE — DIETITIAN INITIAL EVALUATION ADULT - OTHER INFO
89-year-old English-speaking female PMH HTN, CKD(?) does not follow-up  with any doctor admitted for IPH s/p fall  #Subacute Intraparenchymal Hemorrhage possible HTN etiology  #Aspiration PNA/Pneumonitis  #Small Pleural Effusion  #ZEKE on CKD  #Stool [place]

## 2025-03-27 NOTE — DISCHARGE NOTE NURSING/CASE MANAGEMENT/SOCIAL WORK - FINANCIAL ASSISTANCE
[facility] provides services at a reduced cost to those who are determined to be eligible through [facility]’s financial assistance program. Information regarding [facility]’s financial assistance program can be found by going to [email or web address] or by calling [phone].

## 2025-03-27 NOTE — SWALLOW VFSS/MBS ASSESSMENT ADULT - RECOMMENDED FEEDING/EATING TECHNIQUES
use of multiple swallows to clear pharyngeal residue and throat clear cough to prevent materials entering airway./hard swallow w/ each bite or sip/no straws/position upright (90 degrees)/small sips/bites

## 2025-03-27 NOTE — SWALLOW VFSS/MBS ASSESSMENT ADULT - THE ABOVE FINDINGS WERE DISCUSSED WITH
RN, MD, patient's son and daughter in law were made aware of MBS results an recommendations./Physician/Nursing

## 2025-03-27 NOTE — DIETITIAN INITIAL EVALUATION ADULT - ADD RECOMMEND
- advance diet as soon as medically feasible, and add ensure HP QDAY to optimize nutrition for wound healing  - monitor Lytes .  - monitor hydration status   - Consider phos binder w/ meals as phos high from 3/25 levels  - c/w plan for BM regimen   - advance diet as soon as medically feasible, and add ensure HP QDAY to optimize nutrition for wound healing  - monitor Lytes .  - monitor hydration status   - Consider phos binder w/ meals as phos high from 3/25 levels  - c/w plan for BM regimen  - add 2 packet of Ron  to provide 180 kcal + 5 grams protein to aid in promoting Wound healing

## 2025-03-27 NOTE — PROGRESS NOTE ADULT - SUBJECTIVE AND OBJECTIVE BOX
JARODDEANNA OCONNORWZIA  89y  Female      Patient is a 89y old  Female who presents with a chief complaint of Metabolic encephalopathy     (27 Mar 2025 11:42)      INTERVAL HPI/OVERNIGHT EVENTS:      ******************************* REVIEW OF SYSTEMS:**********************************************    All other review of systems negative    *********************** VITALS ******************************************    T(F): 97.6 (03-27-25 @ 13:00)  HR: 99 (03-27-25 @ 13:00) (89 - 106)  BP: 124/68 (03-27-25 @ 13:00) (117/61 - 124/68)  RR: 18 (03-27-25 @ 13:00) (18 - 18)  SpO2: 97% (03-27-25 @ 13:00) (94% - 97%)    03-26-25 @ 07:01  -  03-27-25 @ 07:00  --------------------------------------------------------  IN: 0 mL / OUT: 300 mL / NET: -300 mL            03-26-25 @ 07:01  -  03-27-25 @ 07:00  --------------------------------------------------------  IN: 0 mL / OUT: 300 mL / NET: -300 mL        ******************************** PHYSICAL EXAM:**************************************************  GENERAL: NAD    PSYCH: no agitation,   HEENT:     NERVOUS SYSTEM:  Alert & awake x2     PULMONARY: OCTAVIO, CTA    CARDIOVASCULAR: S1S2 RRR    GI: Soft, NT, ND; BS present.    EXTREMITIES:  2+ Peripheral Pulses, No clubbing, cyanosis, or edema    LYMPH: No lymphadenopathy noted    SKIN: No rashes or lesions      **************************** LABS *******************************************************                          8.7    10.90 )-----------( 405      ( 27 Mar 2025 11:36 )             27.8     03-27    137  |  103  |  70[HH]  ----------------------------<  79  4.3   |  20  |  2.1[H]    Ca    8.3[L]      27 Mar 2025 11:36        Urinalysis Basic - ( 27 Mar 2025 11:36 )    Color: x / Appearance: x / SG: x / pH: x  Gluc: 79 mg/dL / Ketone: x  / Bili: x / Urobili: x   Blood: x / Protein: x / Nitrite: x   Leuk Esterase: x / RBC: x / WBC x   Sq Epi: x / Non Sq Epi: x / Bacteria: x        Lactate Trend        CAPILLARY BLOOD GLUCOSE              **************************Active Medications *******************************************  No Known Allergies      acetaminophen     Tablet .. 650 milliGRAM(s) Oral every 6 hours PRN  aluminum hydroxide/magnesium hydroxide/simethicone Suspension 30 milliLiter(s) Oral every 4 hours PRN  amLODIPine   Tablet 2.5 milliGRAM(s) Oral daily  atorvastatin 40 milliGRAM(s) Oral at bedtime  azithromycin  IVPB      azithromycin  IVPB 500 milliGRAM(s) IV Intermittent every 24 hours  bisacodyl Suppository 10 milliGRAM(s) Rectal daily PRN  cefTRIAXone   IVPB      cefTRIAXone   IVPB 1000 milliGRAM(s) IV Intermittent every 24 hours  chlorhexidine 2% Cloths 1 Application(s) Topical <User Schedule>  melatonin 3 milliGRAM(s) Oral at bedtime PRN  mineral oil enema 133 milliLiter(s) Rectal once  pantoprazole    Tablet 40 milliGRAM(s) Oral before breakfast  polyethylene glycol 3350 17 Gram(s) Oral every 24 hours  senna 2 Tablet(s) Oral at bedtime      ***************************************************  RADIOLOGY & ADDITIONAL TESTS:    Imaging Personally Reviewed:  [ ] YES  [ ] NO    HEALTH ISSUES - PROBLEM Dx:

## 2025-03-27 NOTE — SWALLOW VFSS/MBS ASSESSMENT ADULT - ORAL PHASE COMMENTS
Moderate oral dysphagia negatively impacted by patient's cognitive deficits in the setting of dementia. Pt presented orally defensive with tsp trials. Liquid trials admin via tsp. Initiation of pharyngeal swallow was delayed, bolus head in pyriforms with thin liquids, mildly thick and moderately thick liquid trials. Moderate oral dysphagia negatively impacted by patient's cognitive deficits. Pt presented orally defensive with tsp trials. Liquid trials admin via tsp. Initiation of pharyngeal swallow was delayed, bolus head in pyriforms with thin liquids, mildly thick and moderately thick liquid trials.

## 2025-03-27 NOTE — DIETITIAN INITIAL EVALUATION ADULT - NSFNSADHERENCEPTAFT_GEN_A_CORE
Mikhail HEGIFTY  59 kg - 129 lbs (7/5/2024)  46.2 kg - 101 lbs (3/24/25)  -21.6% wt loss in 8 months 2/2 poor po intake

## 2025-03-27 NOTE — SWALLOW VFSS/MBS ASSESSMENT ADULT - COMMENTS
[employer]  with ID #[id number] provided Uzbek/English interpretation during the study. [employer]  with ID #[id number] provided German/English interpretation during the study.  .

## 2025-03-27 NOTE — DIETITIAN INITIAL EVALUATION ADULT - NUTRITIONGOAL OUTCOME1
Yes-Patient/Caregiver accepts free interpretation services... Pt to eat 50-75% of 4-6 small frequent meals in next 3-5 days.

## 2025-03-27 NOTE — DIETITIAN INITIAL EVALUATION ADULT - OTHER CALCULATIONS
Energy: MSJ x 1.2-1.5 = 5589-8987   kcal/kg   Protein: 1.2-1.5 gm/kg = 55-70 gm protein /day  Fluid: 1 ml/kcal or per MD recommendations     Estimated energy and protein needs with consideration for  BMI, age, mobility, malnutrition, wound, and comorbidities.

## 2025-03-27 NOTE — SWALLOW VFSS/MBS ASSESSMENT ADULT - DIAGNOSTIC IMPRESSIONS
Moderate oropharyngeal dysphagia with thin liquids, mildly thick liquids, moderately thick and puree marked by reduced swallow efficiency and safety. Dysphagia is likely acute on chronic and related to patient's h/o dementia and given admission with dysphagia related complications (PNA, malnutrition/ dehydration). Patient remains at increased risk for dysphagia related aspiration PNA as well as malnutrition an dehydration. Moderate oropharyngeal dysphagia with thin liquids, mildly thick liquids, moderately thick and puree marked by reduced swallow efficiency and safety. Dysphagia is likely multifactorial, related to patient's h/o CVA, possible IPH and recent admission with dysphagia related complications (PNA, malnutrition). Patient remains at increased risk for dysphagia related aspiration PNA as well as malnutrition an dehydration.

## 2025-03-27 NOTE — DIETITIAN NUTRITION RISK NOTIFICATION - ADDITIONAL COMMENTS/DIETITIAN RECOMMENDATIONS
Advance diet as soon as medically feasible, defer to SLP for diet texture recommendations.     Add Ensure Plus High Protein (350 kcal + 20 Grams Protein per 237 mL carton) QDAY to optimize nturtiion

## 2025-03-27 NOTE — DIETITIAN INITIAL EVALUATION ADULT - PERTINENT LABORATORY DATA
03-26    134[L]  |  104  |  60[H]  ----------------------------<  58[L]  4.4   |  14[L]  |  1.9[H]    Ca    7.3[L]      26 Mar 2025 06:41    A1C with Estimated Average Glucose Result: 5.3 % (07-06-24 @ 07:45)

## 2025-03-27 NOTE — DIETITIAN INITIAL EVALUATION ADULT - ORAL INTAKE PTA/DIET HISTORY
Pt Aox1, no family at bedside. Pt previously on pureed diet at home. Spoke w/ son Joseph, [phone] on the phone.   Pt eats 3 times Pureed diet w/ varied appetite at home. Oral nutrition supplement two-three times a day. Son endorses weight loss but is unsure how much weight loss. No diet's followed at home. NKFA.

## 2025-03-27 NOTE — DIETITIAN INITIAL EVALUATION ADULT - PERTINENT MEDS FT
MEDICATIONS  (STANDING):  amLODIPine   Tablet 2.5 milliGRAM(s) Oral daily  atorvastatin 40 milliGRAM(s) Oral at bedtime  azithromycin  IVPB      azithromycin  IVPB 500 milliGRAM(s) IV Intermittent every 24 hours  cefTRIAXone   IVPB      cefTRIAXone   IVPB 1000 milliGRAM(s) IV Intermittent every 24 hours  chlorhexidine 2% Cloths 1 Application(s) Topical <User Schedule>  lactated ringers. 1000 milliLiter(s) (60 mL/Hr) IV Continuous <Continuous>  mineral oil enema 133 milliLiter(s) Rectal once  pantoprazole    Tablet 40 milliGRAM(s) Oral before breakfast  polyethylene glycol 3350 17 Gram(s) Oral every 24 hours  senna 2 Tablet(s) Oral at bedtime

## 2025-03-27 NOTE — SWALLOW VFSS/MBS ASSESSMENT ADULT - SLP GENERAL OBSERVATIONS
Pt. received in radiology suite, C-arm used during study. Pt was awake, 2L O2 nasal cannula, minimal verbal output.

## 2025-03-27 NOTE — SWALLOW VFSS/MBS ASSESSMENT ADULT - LARYNGEAL PENETRATION DURING THE SWALLOW - SILENT
Deep penetration with thin liquids. Penetration was also evident with mildly thick and moderately thick liquids./Moderate

## 2025-03-27 NOTE — SWALLOW VFSS/MBS ASSESSMENT ADULT - SLP PERTINENT HISTORY OF CURRENT PROBLEM
Ms. Moncada is an 89-year-old Estonian-speaking female PMH HTN, CKD(?) does not follow-up with any doctor presented to the ED for decreased p.o. intake, nonverbal status, bedbound last 2 weeks. MRI head showed a chronic right basal ganglia lacunar infarct. Ms. Moncada is an 89-year-old Lithuanian-speaking female PMH HTN, CKD(?). Presented to the ED for decreased PO intake, nonverbal status, bedbound last 2 weeks. MRI head showed a chronic right basal ganglia lacunar infarct. CT chest-->secretions within the RLL bronchi, with postobstructive consolidation within the RLL and patchy opacities within the RUL possibly reflecting pneumonia in the appropriate clinical setting. CTH showed patchy hyperdensity in the left thalamus, which may reflect intraparenchymal hemorrhage. Admitted for subacute IPH s/p fall, possible HTN etiology.

## 2025-03-27 NOTE — PROGRESS NOTE ADULT - ASSESSMENT
Ms. Moncada is an 89-year-old Ukrainian-speaking female PMH HTN, CKD baseline Cr 1.9 admitted for subacute IPH s/p fall 2 weeks ago    #Subacute Intraparenchymal Hemorrhage possible HTN etiology  #s/p Fall ? unsure etiology mechanical vs cardiogenic  -CTH: L thalamus hyperdense consistent with IPH  -CXR: Rib fractures  -continue home amlodipine 2.5 mg qd, maintain SBP < 140  -hold home ASA and DVT ppx for now   -MRI Brain with and without contrast:   There is no evidence of acute infarct, intracranial hemorrhage, mass   effect or midline shift.  There is generalized cortical volume loss with commensurate ventricular   dilation. There is no hydrocephalus.  There is confluent periventricular as well as subcortical white matter T2   signal hyperintensity consistent with severe chronic microvascular type   changes. There is a chronic right basal ganglia lacunar infarct. There   are multiple foci of susceptibility artifact within the bilateral thalami   and basal ganglia consistent with chronic hemorrhages.  -Trauma team survey 3/25     #Acute Normocytic Anemia  -Hg 7.9 -> 6.1 repeat at 11 am was 8.4  > 8.7 today.   -No active signs of bleeding  -Pt on nasal cannula overnight  -continue to monitor , keep active type and screen    #Multiple Rib Fractures s/p fall   -Mildly displaced acute or subacute fractures of the right 2nd-7th   anterior ribs and left 5th and 6th anterior ribs.  - No definite evidence of acute traumatic injury within the abdomen or   pelvis.  -APS    #Aspiration PNA/ Pneumonitis  #Small Pleural Effusion  -CXR: Small right pleural effusion and filling defect/secretions within the   right lower lobe segmental and subsegmental bronchi, with postobstructive   consolidation within the right lower lobe and patchy opacities within the   right upper lobe, possibly reflecting pneumonia in the appropriate   clinical setting.   -c/w ceftriaxone + azithromycin  -procal, BCx, U strep & legionella   -aspiration precautions, S&S eval > noted >> pureed diet     #ZEKE on CKD    -Cr 3.7 from baseline 1.9 likely prerenal due to decreased po intake after fall  - s/p 250 cc bolus LR, LR @ 75 for 24 hours  - will trend Cr while starting on diet.  - would cw gentle hydration for now.     #Stool Pompey  -noted on CT  -dulcolax suppository prn  -Miralax + senna once cleared by S&S     DVT prophylaxis: hold for IPH  GI prophylaxis: PPI   Diet: puree and mildly thickened as per speech and swallow  Code status: full code   Activity: as tolerated    PT > Max assist.     Pending: improved PO intake/ stable Hb /  Plan dw daughter in law.   Dispo: TBD

## 2025-03-27 NOTE — SWALLOW VFSS/MBS ASSESSMENT ADULT - ROSENBEK'S PENETRATION ASPIRATION SCALE
w/thin liquids, mildly thick and moderately thick  liquids/(5) contrast contacts vocal cords, visible residue remains (penetration)

## 2025-03-27 NOTE — PROGRESS NOTE ADULT - SUBJECTIVE AND OBJECTIVE BOX
SUBJECTIVE/OVERNIGHT EVENTS  Today is hospital day 2d. This morning patient was seen and examined at bedside, resting comfortably in bed. No acute or major events overnight. Patient only complaining of being cold.       FAMILY COMMUNICATION  Contact date:  Name of person contacted:  Relationship to patient:  Communication details:    MEDICATIONS  STANDING MEDICATIONS  amLODIPine   Tablet 2.5 milliGRAM(s) Oral daily  atorvastatin 40 milliGRAM(s) Oral at bedtime  azithromycin  IVPB      azithromycin  IVPB 500 milliGRAM(s) IV Intermittent every 24 hours  cefTRIAXone   IVPB      cefTRIAXone   IVPB 1000 milliGRAM(s) IV Intermittent every 24 hours  chlorhexidine 2% Cloths 1 Application(s) Topical <User Schedule>  lactated ringers. 1000 milliLiter(s) IV Continuous <Continuous>  mineral oil enema 133 milliLiter(s) Rectal once  pantoprazole    Tablet 40 milliGRAM(s) Oral before breakfast  polyethylene glycol 3350 17 Gram(s) Oral every 24 hours  senna 2 Tablet(s) Oral at bedtime    PRN MEDICATIONS  acetaminophen     Tablet .. 650 milliGRAM(s) Oral every 6 hours PRN  aluminum hydroxide/magnesium hydroxide/simethicone Suspension 30 milliLiter(s) Oral every 4 hours PRN  bisacodyl Suppository 10 milliGRAM(s) Rectal daily PRN  melatonin 3 milliGRAM(s) Oral at bedtime PRN    VITALS  T(F): 97.9 (03-27-25 @ 04:26), Max: 98.5 (03-26-25 @ 13:30)  HR: 89 (03-27-25 @ 04:26) (89 - 106)  BP: 121/70 (03-27-25 @ 04:26) (114/65 - 121/70)  RR: 18 (03-27-25 @ 04:26) (18 - 18)  SpO2: 96% (03-27-25 @ 04:26) (94% - 96%)    PHYSICAL EXAM  GENERAL  ( x ) NAD, lying in bed comfortably     (  ) obtunded     (  ) lethargic     (  ) somnolent    HEAD  ( x ) Atraumatic     (  ) hematoma     (  ) laceration (specify location:       )     NECK  ( x ) Supple     (  ) neck stiffness     (  ) nuchal rigidity     (  )  no JVD     (  ) JVD present ( -- cm)    HEART  Rate -->  ( x ) normal rate    (  ) bradycardic    (  ) tachycardic  Rhythm -->  ( x ) regular    (  ) regularly irregular    (  ) irregularly irregular  Murmurs -->  (  ) normal s1/s2    (  ) systolic murmur    (  ) diastolic murmur    (  ) continuous murmur     (  ) S3 present    (  ) S4 present    LUNGS  ( x )Unlabored respirations     (  ) tachypnea  (  ) B/L air entry     (  ) decreased breath sounds in:  (location     )    (  ) no adventitious sound     (  ) crackles     (  ) wheezing      (  ) rhonchi      (specify location:       )  (  ) chest wall tenderness (specify location:       )    ABDOMEN  ( x ) Soft     (  ) tense   |   (  ) nondistended     (  ) distended   |   (  ) +BS     (  ) hypoactive bowel sounds     (  ) hyperactive bowel sounds  (  ) nontender     (  ) RUQ tenderness     (  ) RLQ tenderness     (  ) LLQ tenderness     (  ) epigastric tenderness     (  ) diffuse tenderness  (  ) Splenomegaly      (  ) Hepatomegaly      (  ) Jaundice     (  ) ecchymosis     EXTREMITIES  (  ) Normal     (  ) Rash     (  ) ecchymosis     (  ) varicose veins      (  ) pitting edema     (  ) non-pitting edema   (  ) ulceration     (  ) gangrene:     (location:     )    NERVOUS SYSTEM  (  ) A&Ox3     (  ) confused     (  ) lethargic  CN II-XII:     (  ) Intact     (  ) focal deficits  (Specify:     )   Upper extremities:     (  ) strength X/5     (  ) focal deficit (specify:    )  Lower extremities:     (  ) strength  X/5    (  ) focal deficit (specify:    )    SKIN  (  ) No rashes or lesions     (  ) maculopapular rash     (  ) pustules     (  ) vesicles     (  ) ulcer     (  ) ecchymosis     (specify location:     )    (  ) Indwelling Desai Catheter   Date insterted:    Reason (  ) Critical illness     (  ) urinary retention    (  ) Accurate Ins/Outs Monitoring     (  ) CMO patient    (  ) Central Line  Date inserted:  Location: (  ) Right IJ   (  ) Left IJ   (  ) Right Fem   (  ) Left Fem    (  ) SPC  (  ) pigtail  (  ) PEG tube  (  ) colostomy  (  ) jejunostomy  (  ) U-Dall    LABS             8.4    11.33 )-----------( 400      ( 03-26-25 @ 11:06 )             26.0     134  |  104  |  60  -------------------------<  58   03-26-25 @ 06:41  4.4  |  14  |  1.9    Ca      7.3     03-26-25 @ 06:41        Troponin T, High Sensitivity Result: 174 ng/L (03-24-25 @ 16:07)  Troponin T, High Sensitivity Result: 189 ng/L (03-24-25 @ 14:35)    Urinalysis Basic - ( 26 Mar 2025 06:41 )    Color: x / Appearance: x / SG: x / pH: x  Gluc: 58 mg/dL / Ketone: x  / Bili: x / Urobili: x   Blood: x / Protein: x / Nitrite: x   Leuk Esterase: x / RBC: x / WBC x   Sq Epi: x / Non Sq Epi: x / Bacteria: x          Culture - Blood (collected 24 Mar 2025 18:20)  Source: Blood Blood-Peripheral  Preliminary Report (26 Mar 2025 22:01):    No growth at 48 Hours    Culture - Blood (collected 24 Mar 2025 18:20)  Source: Blood Blood-Peripheral  Preliminary Report (26 Mar 2025 22:01):    No growth at 48 Hours    Culture - Urine (collected 24 Mar 2025 13:02)  Source: Clean Catch None  Final Report (26 Mar 2025 00:49):    No growth    Urinalysis with Rflx Culture (collected 24 Mar 2025 13:02)      IMAGING

## 2025-03-27 NOTE — SWALLOW VFSS/MBS ASSESSMENT ADULT - ADDITIONAL RECOMMENDATIONS
SLP recommends to initiate GOC to further assess caregiver goals palliative vs hospice vs CMO.   Patient does not benefit from direct swallow rehabilitation at this time 2/2 cognitive-linguistic deficts in the setting of dementia. SLP recommends to initiate GOC to further assess caregiver goals palliative vs hospice vs CMO.   Patient does not benefit from direct swallow rehabilitation at this time 2/2 cognitive deficits.

## 2025-03-27 NOTE — DISCHARGE NOTE NURSING/CASE MANAGEMENT/SOCIAL WORK - PATIENT PORTAL LINK FT
You can access the FollowMyHealth Patient Portal offered by [facility] by registering at the following website: [email or web address]. By joining [facility]’s FollowMyHealth portal, you will also be able to view your health information using other applications (apps) compatible with our system.

## 2025-03-27 NOTE — SWALLOW VFSS/MBS ASSESSMENT ADULT - PHARYNGEAL PHASE COMMENTS
Pharyngeal phase was characterized by partial approximation of arytenoids to epiglottic petiole, partial hyoid excursion, partial epiglottic inversion, incomplete laryngeal vestibule closure resulting in DEEP AND SILENT PENETRATION WITH THIN LIQUIDS, PENETRATION WAS ALSO OBSERVED MILDLY THICK AND MODERATELY THICK LIQUIDS TRIALS. Pt with periods of coughing after the swallow not related to aspiration. Diminished pharyngeal stripping wave. Wide column of contrast between tongue base and pharyngeal wall. Collection of pharyngeal residue in the BOT, valleculae, pharyngeal wall and pyriform sinuses. Most pharyngeal residue remained with thicker consistencies. Liquid wash with thin liquid wash was effective in clearing some pharyngeal residue.

## 2025-03-27 NOTE — DIETITIAN INITIAL EVALUATION ADULT - EDUCATION DIETARY MODIFICATIONS
discussed diet order w/ family, and discussed recommendations if diet advanced. family in agreement to try ONS QDAY/(1) partially meets; needs review/practice/verbalization

## 2025-03-27 NOTE — DIETITIAN INITIAL EVALUATION ADULT - NAME AND PHONE
Dahlia Coronado x [phone] or [address] [employer]   High risk follow up      Monitoring/Evaluating: PO intake, Labs, Lytes, Wts,  Diet and texture tolerance, NFPF, GI S/S, BM.

## 2025-03-28 LAB
ANION GAP SERPL CALC-SCNC: 12 MMOL/L — SIGNIFICANT CHANGE UP (ref 7–14)
BUN SERPL-MCNC: 61 MG/DL — CRITICAL HIGH (ref 10–20)
CALCIUM SERPL-MCNC: 8.3 MG/DL — LOW (ref 8.4–10.5)
CHLORIDE SERPL-SCNC: 105 MMOL/L — SIGNIFICANT CHANGE UP (ref 98–110)
CO2 SERPL-SCNC: 22 MMOL/L — SIGNIFICANT CHANGE UP (ref 17–32)
CREAT SERPL-MCNC: 2.2 MG/DL — HIGH (ref 0.7–1.5)
EGFR: 21 ML/MIN/1.73M2 — LOW
EGFR: 21 ML/MIN/1.73M2 — LOW
GLUCOSE SERPL-MCNC: 74 MG/DL — SIGNIFICANT CHANGE UP (ref 70–99)
HCT VFR BLD CALC: 26.2 % — LOW (ref 37–47)
HGB BLD-MCNC: 8.3 G/DL — LOW (ref 12–16)
MCHC RBC-ENTMCNC: 29.5 PG — SIGNIFICANT CHANGE UP (ref 27–31)
MCHC RBC-ENTMCNC: 31.7 G/DL — LOW (ref 32–37)
MCV RBC AUTO: 93.2 FL — SIGNIFICANT CHANGE UP (ref 81–99)
NRBC BLD AUTO-RTO: 0 /100 WBCS — SIGNIFICANT CHANGE UP (ref 0–0)
PLATELET # BLD AUTO: 410 K/UL — HIGH (ref 130–400)
PMV BLD: 9 FL — SIGNIFICANT CHANGE UP (ref 7.4–10.4)
POTASSIUM SERPL-MCNC: 4.1 MMOL/L — SIGNIFICANT CHANGE UP (ref 3.5–5)
POTASSIUM SERPL-SCNC: 4.1 MMOL/L — SIGNIFICANT CHANGE UP (ref 3.5–5)
RBC # BLD: 2.81 M/UL — LOW (ref 4.2–5.4)
RBC # FLD: 15.5 % — HIGH (ref 11.5–14.5)
SODIUM SERPL-SCNC: 139 MMOL/L — SIGNIFICANT CHANGE UP (ref 135–146)
WBC # BLD: 7.04 K/UL — SIGNIFICANT CHANGE UP (ref 4.8–10.8)
WBC # FLD AUTO: 7.04 K/UL — SIGNIFICANT CHANGE UP (ref 4.8–10.8)

## 2025-03-28 PROCEDURE — 99232 SBSQ HOSP IP/OBS MODERATE 35: CPT

## 2025-03-28 RX ORDER — SALINE 7; 19 G/118ML; G/118ML
1 ENEMA RECTAL ONCE
Refills: 0 | Status: COMPLETED | OUTPATIENT
Start: 2025-03-28 | End: 2025-03-28

## 2025-03-28 RX ADMIN — ATORVASTATIN CALCIUM 40 MILLIGRAM(S): 80 TABLET, FILM COATED ORAL at 21:35

## 2025-03-28 RX ADMIN — CEFTRIAXONE 100 MILLIGRAM(S): 500 INJECTION, POWDER, FOR SOLUTION INTRAMUSCULAR; INTRAVENOUS at 02:04

## 2025-03-28 RX ADMIN — POLYETHYLENE GLYCOL 3350 17 GRAM(S): 17 POWDER, FOR SOLUTION ORAL at 12:41

## 2025-03-28 RX ADMIN — Medication 3 MILLIGRAM(S): at 21:35

## 2025-03-28 RX ADMIN — Medication 2 TABLET(S): at 21:36

## 2025-03-28 RX ADMIN — Medication 250 MILLIGRAM(S): at 01:48

## 2025-03-28 RX ADMIN — AMLODIPINE BESYLATE 2.5 MILLIGRAM(S): 10 TABLET ORAL at 05:35

## 2025-03-28 RX ADMIN — Medication 1 APPLICATION(S): at 05:35

## 2025-03-28 RX ADMIN — Medication 40 MILLIGRAM(S): at 05:35

## 2025-03-28 RX ADMIN — SALINE 1 ENEMA: 7; 19 ENEMA RECTAL at 18:13

## 2025-03-28 NOTE — SWALLOW BEDSIDE ASSESSMENT ADULT - SWALLOW EVAL: RECOMMENDED DIET
Continue with a puree diet w/thin liquids
NPO with non oral means for nutrition/ hydration
Puree with mildly thick liquids

## 2025-03-28 NOTE — SWALLOW BEDSIDE ASSESSMENT ADULT - COMMENTS
MBS completed 3/27/25. Results revealed moderate oropharyngeal dysphagia with thin liquids, mildly thick liquids, moderately thick and puree marked by reduced swallow efficiency and safety. Patient remains at increased risk for dysphagia related aspiration PNA as well as malnutrition an dehydration. Recommendations to Initiate a puree diet with thin liquids.  hard swallow w/ each bite or sip; no straws; position upright (90 degrees); small sips/bites; use of multiple swallows to clear pharyngeal residue and throat clear cough to prevent materials entering airway.

## 2025-03-28 NOTE — PROGRESS NOTE ADULT - SUBJECTIVE AND OBJECTIVE BOX
ALEIDA OLIVERAZIA  89y  Female      Patient is a 89y old  Female who presents with a chief complaint of fall/ dec ambulation.       INTERVAL HPI/OVERNIGHT EVENTS:      ******************************* REVIEW OF SYSTEMS:**********************************************    All other review of systems negative    *********************** VITALS ******************************************    T(F): 97.2 (03-28-25 @ 11:59)  HR: 93 (03-28-25 @ 11:59) (84 - 98)  BP: 121/69 (03-28-25 @ 11:59) (114/54 - 121/69)  RR: 18 (03-28-25 @ 11:59) (18 - 18)  SpO2: 100% (03-28-25 @ 11:59) (97% - 100%)    03-27-25 @ 07:01  -  03-28-25 @ 07:00  --------------------------------------------------------  IN: 0 mL / OUT: 400 mL / NET: -400 mL    03-28-25 @ 07:01  -  03-28-25 @ 16:32  --------------------------------------------------------  IN: 0 mL / OUT: 400 mL / NET: -400 mL            03-27-25 @ 07:01  -  03-28-25 @ 07:00  --------------------------------------------------------  IN: 0 mL / OUT: 400 mL / NET: -400 mL    03-28-25 @ 07:01  -  03-28-25 @ 16:32  --------------------------------------------------------  IN: 0 mL / OUT: 400 mL / NET: -400 mL        ******************************** PHYSICAL EXAM:**************************************************  GENERAL: NAD    PSYCH: no agitation,  HEENT:     NERVOUS SYSTEM:  Alert & Oriented X1-2  PULMONARY: OCTAVIO, CTA    CARDIOVASCULAR: S1S2 RRR    GI: Soft, NT, ND; BS present.    EXTREMITIES:  2+ Peripheral Pulses, No clubbing, cyanosis, or edema    LYMPH: No lymphadenopathy noted    SKIN: No rashes or lesions      **************************** LABS *******************************************************                          8.3    7.04  )-----------( 410      ( 28 Mar 2025 06:25 )             26.2     03-28    139  |  105  |  61[HH]  ----------------------------<  74  4.1   |  22  |  2.2[H]    Ca    8.3[L]      28 Mar 2025 06:25        Urinalysis Basic - ( 28 Mar 2025 06:25 )    Color: x / Appearance: x / SG: x / pH: x  Gluc: 74 mg/dL / Ketone: x  / Bili: x / Urobili: x   Blood: x / Protein: x / Nitrite: x   Leuk Esterase: x / RBC: x / WBC x   Sq Epi: x / Non Sq Epi: x / Bacteria: x        Lactate Trend        CAPILLARY BLOOD GLUCOSE              **************************Active Medications *******************************************  No Known Allergies      acetaminophen     Tablet .. 650 milliGRAM(s) Oral every 6 hours PRN  aluminum hydroxide/magnesium hydroxide/simethicone Suspension 30 milliLiter(s) Oral every 4 hours PRN  amLODIPine   Tablet 2.5 milliGRAM(s) Oral daily  atorvastatin 40 milliGRAM(s) Oral at bedtime  azithromycin  IVPB      azithromycin  IVPB 500 milliGRAM(s) IV Intermittent every 24 hours  bisacodyl Suppository 10 milliGRAM(s) Rectal daily PRN  cefTRIAXone   IVPB      cefTRIAXone   IVPB 1000 milliGRAM(s) IV Intermittent every 24 hours  chlorhexidine 2% Cloths 1 Application(s) Topical <User Schedule>  lactated ringers. 1000 milliLiter(s) IV Continuous <Continuous>  melatonin 3 milliGRAM(s) Oral at bedtime PRN  pantoprazole    Tablet 40 milliGRAM(s) Oral before breakfast  polyethylene glycol 3350 17 Gram(s) Oral every 24 hours  saline laxative (FLEET) Rectal Enema 1 Enema Rectal once  senna 2 Tablet(s) Oral at bedtime      ***************************************************  RADIOLOGY & ADDITIONAL TESTS:    Imaging Personally Reviewed:  [ ] YES  [ ] NO    HEALTH ISSUES - PROBLEM Dx:

## 2025-03-28 NOTE — SWALLOW BEDSIDE ASSESSMENT ADULT - ASR SWALLOW ASPIRATION MONITOR
change of breathing pattern/pneumonia
change of breathing pattern/oral hygiene/position upright (90Y)/cough/gurgly voice/fever/pneumonia/throat clearing/upper respiratory infection
change of breathing pattern/oral hygiene/position upright (90Y)/cough/gurgly voice/fever/pneumonia/throat clearing/upper respiratory infection

## 2025-03-28 NOTE — SWALLOW BEDSIDE ASSESSMENT ADULT - SWALLOW EVAL: ORAL MUSCULATURE
unable to assess due to poor participation/comprehension
unable to assess due to poor participation/comprehension
Generalized weakness/unable to assess due to poor participation/comprehension

## 2025-03-28 NOTE — SWALLOW BEDSIDE ASSESSMENT ADULT - NS ASR SWALLOW FINDINGS DISCUS
DELMER Estrada and MD Pena message via TEAMS/Physician/Nursing/Patient
Spoke with RN at b/s, Messaged MD via TEAMS/Physician/Nursing/Patient
RN and MD made aware/Physician/Nursing

## 2025-03-28 NOTE — SWALLOW BEDSIDE ASSESSMENT ADULT - SLP PERTINENT HISTORY OF CURRENT PROBLEM
Ms. Moncada is an 89-year-old Yi-speaking female PMH HTN, CKD(?) does not follow-up with any doctor presented to the ED for decreased p.o. intake, nonverbal status, bedbound last 2 weeks. MRI head showed a chronic right basal ganglia lacunar infarct. CTH showed patchy hyperdensity in the left thalamus, which may reflect intraparenchymal hemorrhage. Admitted for subacute IPH s/p fall, possible HTN etiology. Ms. Moncada is an 89-year-old Portuguese-speaking female PMH HTN, CKD(?) does not follow-up with any doctor presented to the ED for decreased p.o. intake, nonverbal status, bedbound last 2 weeks. MRI head showed a chronic right basal ganglia lacunar infarct. CT chest-->secretions within the RLL bronchi, with postobstructive consolidation within the RLL and patchy opacities within the RUL possibly reflecting pneumonia in the appropriate clinical setting. CTH showed patchy hyperdensity in the left thalamus, which may reflect intraparenchymal hemorrhage. Admitted for subacute IPH s/p fall, possible HTN etiology.

## 2025-03-28 NOTE — PROGRESS NOTE ADULT - ASSESSMENT
Ms. Moncada is an 89-year-old Estonian-speaking female PMH HTN, CKD baseline Cr 1.9 admitted for subacute IPH s/p fall 2 weeks ago    #Subacute Intraparenchymal Hemorrhage possible HTN etiology  #s/p Fall ? unsure etiology mechanical vs cardiogenic  -CTH: L thalamus hyperdense consistent with IPH  -CXR: Rib fractures  -continue home amlodipine 2.5 mg qd, maintain SBP < 140  -hold home ASA and DVT ppx for now   -MRI Brain with and without contrast:   There is no evidence of acute infarct, intracranial hemorrhage, mass   effect or midline shift.  There is generalized cortical volume loss with commensurate ventricular   dilation. There is no hydrocephalus.  There is confluent periventricular as well as subcortical white matter T2   signal hyperintensity consistent with severe chronic microvascular type   changes. There is a chronic right basal ganglia lacunar infarct. There   are multiple foci of susceptibility artifact within the bilateral thalami   and basal ganglia consistent with chronic hemorrhages.  -Trauma team survey 3/25     #Acute Normocytic Anemia  -Hg 7.9 -> 6.1 repeat at 11 am was 8.4  > 8.7 today.   -No active signs of bleeding  -Pt on nasal cannula overnight  -continue to monitor , keep active type and screen    #Multiple Rib Fractures s/p fall   -Mildly displaced acute or subacute fractures of the right 2nd-7th   anterior ribs and left 5th and 6th anterior ribs.  - No definite evidence of acute traumatic injury within the abdomen or   pelvis.  -APS    #Aspiration PNA/ Pneumonitis  #Small Pleural Effusion  -CXR: Small right pleural effusion and filling defect/secretions within the   right lower lobe segmental and subsegmental bronchi, with postobstructive   consolidation within the right lower lobe and patchy opacities within the   right upper lobe, possibly reflecting pneumonia in the appropriate   clinical setting.   -c/w ceftriaxone + azithromycin  -procal, BCx, U strep & legionella   -aspiration precautions, S&S eval > noted >> pureed diet     #ZEKE on CKD    -Cr 3.7 from baseline 1.9 likely prerenal due to decreased po intake after fall  - s/p 250 cc bolus LR,   - will resume IVF to D5ns @75  -  trend Cr     #Stool [place]  -noted on CT  - will try enema    DVT prophylaxis: hold for IPH  GI prophylaxis: PPI   Diet: puree and mildly thickened as per speech and swallow  Code status: full code   Activity: as tolerated    PT > Max assist.     Pending: improved PO intake/ fecal impaction/ placement  Plan dw daughter in law.   Dispo: TBD, possible SNF

## 2025-03-28 NOTE — SWALLOW BEDSIDE ASSESSMENT ADULT - ORAL PHASE
Decreased anterior-posterior movement of the bolus/Delayed oral transit time
Delayed oral transit time
Delayed oral transit time

## 2025-03-28 NOTE — SWALLOW BEDSIDE ASSESSMENT ADULT - SWALLOW EVAL: RECOMMENDED FEEDING/EATING TECHNIQUES
maintain upright posture during/after eating for 30 mins/oral hygiene/position upright (90 degrees)/small sips/bites
maintain upright posture during/after eating for 30 mins/oral hygiene/position upright (90 degrees)/small sips/bites
use multiple swallows, throat clear and cough./maintain upright posture during/after eating for 30 mins/no straws/small sips/bites

## 2025-03-28 NOTE — SWALLOW BEDSIDE ASSESSMENT ADULT - SLP GENERAL OBSERVATIONS
Pt. received in bed, lethargic on 2L O2 nasal cannula. Minimal verbal output. Daughter at b/s.
Pt. received at bedside, awake, 2L O2 nasal cannula, minimal verbal output. Daughter at bedside.
Pt. received lethargic on 2L O2 nasal cannula. Minimal verbal output. Daughter in law and son at b/s.

## 2025-03-28 NOTE — SWALLOW BEDSIDE ASSESSMENT ADULT - SWALLOW EVAL: FUNCTIONAL LEVEL AT TIME OF EVAL
Awake, confused.
Daughter at b/s provided medical history. Reported change in swallow function 3 weeks ago after pt. fell (spitting out solid foods, weight loss, and facial grimacing during the swallow).
appeared lethargic, weak, deconditioned, non productive cough.

## 2025-03-28 NOTE — PROGRESS NOTE ADULT - SUBJECTIVE AND OBJECTIVE BOX
SUBJECTIVE/OVERNIGHT EVENTS  Today is hospital day 3d. This morning patient was seen and examined at bedside, resting comfortably in bed. No acute or major events overnight.        MEDICATIONS  STANDING MEDICATIONS  amLODIPine   Tablet 2.5 milliGRAM(s) Oral daily  atorvastatin 40 milliGRAM(s) Oral at bedtime  azithromycin  IVPB      azithromycin  IVPB 500 milliGRAM(s) IV Intermittent every 24 hours  cefTRIAXone   IVPB      cefTRIAXone   IVPB 1000 milliGRAM(s) IV Intermittent every 24 hours  chlorhexidine 2% Cloths 1 Application(s) Topical <User Schedule>  lactated ringers. 1000 milliLiter(s) IV Continuous <Continuous>  pantoprazole    Tablet 40 milliGRAM(s) Oral before breakfast  polyethylene glycol 3350 17 Gram(s) Oral every 24 hours  senna 2 Tablet(s) Oral at bedtime    PRN MEDICATIONS  acetaminophen     Tablet .. 650 milliGRAM(s) Oral every 6 hours PRN  aluminum hydroxide/magnesium hydroxide/simethicone Suspension 30 milliLiter(s) Oral every 4 hours PRN  bisacodyl Suppository 10 milliGRAM(s) Rectal daily PRN  melatonin 3 milliGRAM(s) Oral at bedtime PRN    VITALS  T(F): 97.3 (03-28-25 @ 05:20), Max: 98 (03-27-25 @ 20:31)  HR: 84 (03-28-25 @ 05:20) (84 - 99)  BP: 119/67 (03-28-25 @ 05:20) (114/54 - 124/68)  RR: 18 (03-28-25 @ 05:20) (18 - 18)  SpO2: 98% (03-28-25 @ 05:20) (97% - 98%)      PHYSICAL EXAM:  GENERAL: NAD, well-groomed, well-developed  HEAD:  Atraumatic, Normocephalic  EYES: EOMI, PERRLA, conjunctiva and sclera clear  ENMT:  Moist mucous membranes  NECK: Supple, No JVD,  CHEST/LUNG: Clear to auscultation bilaterally  HEART: Regular rate and rhythm  ABDOMEN: Soft, Nontender, Nondistended; Bowel sounds present  NEURO:  MENTAL STATUS: AAOx3  EXTREMITIES: No LE edema, no calf tenderness  LYMPH: No lymphadenopathy noted  SKIN: No rashes or lesions         LABS             8.3    7.04  )-----------( 410      ( 03-28-25 @ 06:25 )             26.2     137  |  103  |  70  -------------------------<  79   03-27-25 @ 11:36  4.3  |  20  |  2.1    Ca      8.3     03-27-25 @ 11:36          Urinalysis Basic - ( 27 Mar 2025 11:36 )    Color: x / Appearance: x / SG: x / pH: x  Gluc: 79 mg/dL / Ketone: x  / Bili: x / Urobili: x   Blood: x / Protein: x / Nitrite: x   Leuk Esterase: x / RBC: x / WBC x   Sq Epi: x / Non Sq Epi: x / Bacteria: x          IMAGING      ASSESSMENT AND PLAN:                   SUBJECTIVE/OVERNIGHT EVENTS  Today is hospital day 3d. This morning patient was seen and examined at bedside, resting comfortably in bed. Very lethargic this am.        MEDICATIONS  STANDING MEDICATIONS  amLODIPine   Tablet 2.5 milliGRAM(s) Oral daily  atorvastatin 40 milliGRAM(s) Oral at bedtime  azithromycin  IVPB      azithromycin  IVPB 500 milliGRAM(s) IV Intermittent every 24 hours  cefTRIAXone   IVPB      cefTRIAXone   IVPB 1000 milliGRAM(s) IV Intermittent every 24 hours  chlorhexidine 2% Cloths 1 Application(s) Topical <User Schedule>  lactated ringers. 1000 milliLiter(s) IV Continuous <Continuous>  pantoprazole    Tablet 40 milliGRAM(s) Oral before breakfast  polyethylene glycol 3350 17 Gram(s) Oral every 24 hours  senna 2 Tablet(s) Oral at bedtime    PRN MEDICATIONS  acetaminophen     Tablet .. 650 milliGRAM(s) Oral every 6 hours PRN  aluminum hydroxide/magnesium hydroxide/simethicone Suspension 30 milliLiter(s) Oral every 4 hours PRN  bisacodyl Suppository 10 milliGRAM(s) Rectal daily PRN  melatonin 3 milliGRAM(s) Oral at bedtime PRN    VITALS  T(F): 97.3 (03-28-25 @ 05:20), Max: 98 (03-27-25 @ 20:31)  HR: 84 (03-28-25 @ 05:20) (84 - 99)  BP: 119/67 (03-28-25 @ 05:20) (114/54 - 124/68)  RR: 18 (03-28-25 @ 05:20) (18 - 18)  SpO2: 98% (03-28-25 @ 05:20) (97% - 98%)      PHYSICAL EXAM:  GENERAL: NAD   HEAD:  Atraumatic, Normocephalic  EYES: EOMI   ENMT:  Moist mucous membranes  NECK: Supple   CHEST/LUNG: Clear to auscultation bilaterally  HEART: Regular rate and rhythm  ABDOMEN: Soft, Nontender   NEURO:  MENTAL STATUS: AAOx1  EXTREMITIES: No LE edema, no calf tenderness  LYMPH: No lymphadenopathy noted  SKIN: No rashes or lesions         LABS             8.3    7.04  )-----------( 410      ( 03-28-25 @ 06:25 )             26.2     137  |  103  |  70  -------------------------<  79   03-27-25 @ 11:36  4.3  |  20  |  2.1    Ca      8.3     03-27-25 @ 11:36          Urinalysis Basic - ( 27 Mar 2025 11:36 )    Color: x / Appearance: x / SG: x / pH: x  Gluc: 79 mg/dL / Ketone: x  / Bili: x / Urobili: x   Blood: x / Protein: x / Nitrite: x   Leuk Esterase: x / RBC: x / WBC x   Sq Epi: x / Non Sq Epi: x / Bacteria: x          IMAGING      ASSESSMENT AND PLAN:

## 2025-03-28 NOTE — SWALLOW BEDSIDE ASSESSMENT ADULT - SWALLOW EVAL: PATIENT/FAMILY GOALS STATEMENT
Pt's daughter in law at bedside reported wishes towards comfort goals.
Daughter in law at b/s and son provided medical history. Reported change in swallow function 3 weeks ago, coughing liquids and facial grimacing during the swallow. Son and daughter in law educated about MBS rationale and NPO recommendations. Both reported understanding and agreement.

## 2025-03-28 NOTE — SWALLOW BEDSIDE ASSESSMENT ADULT - ADDITIONAL RECOMMENDATIONS
SLP will plan to f/u 3/27/25 with MBS instrumental swallow study to further assess swallow physiology.
Reconsult ST services PRN  Consider palliative care consult to further discuss and have clarification for GOC.

## 2025-03-28 NOTE — SWALLOW BEDSIDE ASSESSMENT ADULT - DIET PRIOR TO ADMI
Puree w/thin liquids
Puree with thin liquids (reported baseline diet at home)
Puree with thin liquids

## 2025-03-28 NOTE — PROGRESS NOTE ADULT - ASSESSMENT
Ms. Moncada is an 89-year-old Chinese-speaking female PMH HTN, CKD baseline Cr 1.9 admitted for subacute IPH s/p fall 3 weeks ago    #Subacute Intraparenchymal Hemorrhage possible HTN etiology  #s/p Fall ? unsure etiology mechanical vs cardiogenic  -CTH: L thalamus hyperdense consistent with IPH  -CXR: Rib fractures  -continue home amlodipine 2.5 mg qd, maintain SBP < 140  -hold home ASA and DVT ppx for now   -MRI Brain with and without contrast: There is no evidence of acute infarct, intracranial hemorrhage, mass   effect or midline shift. There is generalized cortical volume loss with commensurate ventricular   dilation. There is no hydrocephalus. There is confluent periventricular as well as subcortical white matter T2   signal hyperintensity consistent with severe chronic microvascular type   changes. There is a chronic right basal ganglia lacunar infarct. There   are multiple foci of susceptibility artifact within the bilateral thalami   and basal ganglia consistent with chronic hemorrhages.  -Trauma team survey 3/25     #Acute Normocytic Anemia  -Hg 7.9 -> 6.1 repeat at 11 am was 8.4   -No active signs of bleeding  -Pt on nasal cannula overnight  -continue to monitor , keep active type and screen    #Multiple Rib Fractures s/p fall   -Mildly displaced acute or subacute fractures of the right 2nd-7th   anterior ribs and left 5th and 6th anterior ribs.  - No definite evidence of acute traumatic injury within the abdomen or   pelvis.  -Trauma team: survey 3/25  -APS    #Aspiration PNA/Pneumonitis  #Small Pleural Effusion  -CXR: Small right pleural effusion and filling defect/secretions within the   right lower lobe segmental and subsegmental bronchi, with postobstructive   consolidation within the right lower lobe and patchy opacities within the   right upper lobe, possibly reflecting pneumonia in the appropriate   clinical setting.   -c/w ceftriaxone + azithromycin  -procal, BCx, U strep & legionella   -aspiration precautions   -barium esophogram done. S&S: puree diet    #ZEKE on CKD    -Cr 3.7 from baseline 1.9 likely prerenal due to decreased po intake after fall  -250 cc bolus LR, LR @ 75 for 24 hours    #Stool [place]  -noted on CT  -mineral oil enema    DVT prophylaxis: hold for IPH  GI prophylaxis: PPI   Diet: puree and mildly thickened as per speech and swallow  Code status: full code   Activity: Bedrest for now   Dispo: floor    Pending: improved PO intake/ stable Hb / PT  Plan dw daughter in law.   Dispo: TBD    Ms. Moncada is an 89-year-old Urdu-speaking female PMH HTN, CKD baseline Cr 1.9 admitted for subacute IPH s/p fall 3 weeks ago    #Subacute Intraparenchymal Hemorrhage possible HTN etiology  #s/p Fall ? unsure etiology mechanical vs cardiogenic  -CTH: L thalamus hyperdense consistent with IPH  -CXR: Rib fractures  -continue home amlodipine 2.5 mg qd, maintain SBP < 140  -hold home ASA and DVT ppx for now   -MRI Brain with and without contrast: There is no evidence of acute infarct, intracranial hemorrhage, mass   effect or midline shift. There is generalized cortical volume loss with commensurate ventricular   dilation. There is no hydrocephalus. There is confluent periventricular as well as subcortical white matter T2   signal hyperintensity consistent with severe chronic microvascular type   changes. There is a chronic right basal ganglia lacunar infarct. There   are multiple foci of susceptibility artifact within the bilateral thalami   and basal ganglia consistent with chronic hemorrhages.  -Trauma team survey 3/25     #Acute Normocytic Anemia  -Hg 7.9 -> 6.1 repeat at 11 am was 8.4   -No active signs of bleeding  -Pt on nasal cannula overnight  -continue to monitor , keep active type and screen    #Multiple Rib Fractures s/p fall   -Mildly displaced acute or subacute fractures of the right 2nd-7th   anterior ribs and left 5th and 6th anterior ribs.  - No definite evidence of acute traumatic injury within the abdomen or   pelvis.  -Trauma team: survey 3/25  -APS    #Aspiration PNA/ Pneumonitis  #Small Pleural Effusion  -CXR: Small right pleural effusion and filling defect/secretions within the   right lower lobe segmental and subsegmental bronchi, with postobstructive   consolidation within the right lower lobe and patchy opacities within the   right upper lobe, possibly reflecting pneumonia in the appropriate   clinical setting.   -c/w ceftriaxone + azithromycin  -procal, BCx, U strep & legionella   -aspiration precautions   -barium esophogram done. S&S: puree diet    #ZEKE on CKD    -Cr 3.7 from baseline 1.9 likely prerenal due to decreased po intake after fall  -250 cc bolus LR, LR @ 75 for 24 hours    #Stool [place]  -noted on CT  -mineral oil enema    DVT prophylaxis: hold for IPH  GI prophylaxis: PPI   Diet: puree and mildly thickened as per speech and swallow  Code status: full code   Activity: Bedrest for now   Dispo: floor    Pending: improved PO intake/ stable Hb / PT  Plan dw daughter in law.   Dispo: TBD

## 2025-03-29 LAB
ANION GAP SERPL CALC-SCNC: 14 MMOL/L — SIGNIFICANT CHANGE UP (ref 7–14)
BUN SERPL-MCNC: 45 MG/DL — HIGH (ref 10–20)
CALCIUM SERPL-MCNC: 8.3 MG/DL — LOW (ref 8.4–10.5)
CHLORIDE SERPL-SCNC: 103 MMOL/L — SIGNIFICANT CHANGE UP (ref 98–110)
CO2 SERPL-SCNC: 22 MMOL/L — SIGNIFICANT CHANGE UP (ref 17–32)
CREAT SERPL-MCNC: 1.9 MG/DL — HIGH (ref 0.7–1.5)
CULTURE RESULTS: SIGNIFICANT CHANGE UP
CULTURE RESULTS: SIGNIFICANT CHANGE UP
EGFR: 25 ML/MIN/1.73M2 — LOW
EGFR: 25 ML/MIN/1.73M2 — LOW
GLUCOSE SERPL-MCNC: 75 MG/DL — SIGNIFICANT CHANGE UP (ref 70–99)
HCT VFR BLD CALC: 28.9 % — LOW (ref 37–47)
HGB BLD-MCNC: 9.2 G/DL — LOW (ref 12–16)
MCHC RBC-ENTMCNC: 29.5 PG — SIGNIFICANT CHANGE UP (ref 27–31)
MCHC RBC-ENTMCNC: 31.8 G/DL — LOW (ref 32–37)
MCV RBC AUTO: 92.6 FL — SIGNIFICANT CHANGE UP (ref 81–99)
NRBC BLD AUTO-RTO: 0 /100 WBCS — SIGNIFICANT CHANGE UP (ref 0–0)
PLATELET # BLD AUTO: 421 K/UL — HIGH (ref 130–400)
PMV BLD: 8.9 FL — SIGNIFICANT CHANGE UP (ref 7.4–10.4)
POTASSIUM SERPL-MCNC: 4.3 MMOL/L — SIGNIFICANT CHANGE UP (ref 3.5–5)
POTASSIUM SERPL-SCNC: 4.3 MMOL/L — SIGNIFICANT CHANGE UP (ref 3.5–5)
RBC # BLD: 3.12 M/UL — LOW (ref 4.2–5.4)
RBC # FLD: 15.6 % — HIGH (ref 11.5–14.5)
SODIUM SERPL-SCNC: 139 MMOL/L — SIGNIFICANT CHANGE UP (ref 135–146)
SPECIMEN SOURCE: SIGNIFICANT CHANGE UP
SPECIMEN SOURCE: SIGNIFICANT CHANGE UP
WBC # BLD: 8.09 K/UL — SIGNIFICANT CHANGE UP (ref 4.8–10.8)
WBC # FLD AUTO: 8.09 K/UL — SIGNIFICANT CHANGE UP (ref 4.8–10.8)

## 2025-03-29 PROCEDURE — 99232 SBSQ HOSP IP/OBS MODERATE 35: CPT

## 2025-03-29 RX ADMIN — Medication 2 TABLET(S): at 21:38

## 2025-03-29 RX ADMIN — CEFTRIAXONE 100 MILLIGRAM(S): 500 INJECTION, POWDER, FOR SOLUTION INTRAMUSCULAR; INTRAVENOUS at 01:52

## 2025-03-29 RX ADMIN — Medication 40 MILLIGRAM(S): at 05:47

## 2025-03-29 RX ADMIN — POLYETHYLENE GLYCOL 3350 17 GRAM(S): 17 POWDER, FOR SOLUTION ORAL at 11:40

## 2025-03-29 RX ADMIN — ATORVASTATIN CALCIUM 40 MILLIGRAM(S): 80 TABLET, FILM COATED ORAL at 21:38

## 2025-03-29 RX ADMIN — Medication 3 MILLIGRAM(S): at 21:38

## 2025-03-29 RX ADMIN — Medication 250 MILLIGRAM(S): at 01:52

## 2025-03-29 RX ADMIN — Medication 1 APPLICATION(S): at 05:48

## 2025-03-29 RX ADMIN — AMLODIPINE BESYLATE 2.5 MILLIGRAM(S): 10 TABLET ORAL at 05:48

## 2025-03-29 NOTE — PROGRESS NOTE ADULT - SUBJECTIVE AND OBJECTIVE BOX
DEANNA OLIVERAWZIA  89y  Female      Patient is a 89y old  Female who presents with a chief complaint of fall/ dec ambulation.    INTERVAL HPI/OVERNIGHT EVENTS:      ******************************* REVIEW OF SYSTEMS:**********************************************  unable to obtain full ROS for poor mental status   All other review of systems negative    *********************** VITALS ******************************************    T(F): 97.2 (03-29-25 @ 11:40)  HR: 98 (03-29-25 @ 11:40) (78 - 101)  BP: 102/62 (03-29-25 @ 11:40) (102/62 - 138/64)  RR: 18 (03-29-25 @ 11:40) (18 - 19)  SpO2: 92% (03-29-25 @ 11:40) (91% - 92%)    03-28-25 @ 07:01  -  03-29-25 @ 07:00  --------------------------------------------------------  IN: 0 mL / OUT: 800 mL / NET: -800 mL            03-28-25 @ 07:01  -  03-29-25 @ 07:00  --------------------------------------------------------  IN: 0 mL / OUT: 800 mL / NET: -800 mL        ******************************** PHYSICAL EXAM:**************************************************  GENERAL: NAD    PSYCH: no agitation, HEENT:     NERVOUS SYSTEM:  Alert & awake x 2     PULMONARY: OCTAVIO, CTA    CARDIOVASCULAR: S1S2 RRR    GI: Soft, NT, ND; BS present.    EXTREMITIES:  2+ Peripheral Pulses, No clubbing, cyanosis, or edema    LYMPH: No lymphadenopathy noted    SKIN: No rashes or lesions      **************************** LABS *******************************************************                          9.2    8.09  )-----------( 421      ( 29 Mar 2025 07:17 )             28.9     03-29    139  |  103  |  45[H]  ----------------------------<  75  4.3   |  22  |  1.9[H]    Ca    8.3[L]      29 Mar 2025 07:17        Urinalysis Basic - ( 29 Mar 2025 07:17 )    Color: x / Appearance: x / SG: x / pH: x  Gluc: 75 mg/dL / Ketone: x  / Bili: x / Urobili: x   Blood: x / Protein: x / Nitrite: x   Leuk Esterase: x / RBC: x / WBC x   Sq Epi: x / Non Sq Epi: x / Bacteria: x        Lactate Trend        CAPILLARY BLOOD GLUCOSE              **************************Active Medications *******************************************  No Known Allergies      acetaminophen     Tablet .. 650 milliGRAM(s) Oral every 6 hours PRN  aluminum hydroxide/magnesium hydroxide/simethicone Suspension 30 milliLiter(s) Oral every 4 hours PRN  atorvastatin 40 milliGRAM(s) Oral at bedtime  azithromycin  IVPB      azithromycin  IVPB 500 milliGRAM(s) IV Intermittent every 24 hours  bisacodyl Suppository 10 milliGRAM(s) Rectal daily PRN  cefTRIAXone   IVPB      cefTRIAXone   IVPB 1000 milliGRAM(s) IV Intermittent every 24 hours  chlorhexidine 2% Cloths 1 Application(s) Topical <User Schedule>  lactated ringers. 1000 milliLiter(s) IV Continuous <Continuous>  melatonin 3 milliGRAM(s) Oral at bedtime PRN  pantoprazole    Tablet 40 milliGRAM(s) Oral before breakfast  polyethylene glycol 3350 17 Gram(s) Oral every 24 hours  senna 2 Tablet(s) Oral at bedtime      ***************************************************  RADIOLOGY & ADDITIONAL TESTS:    Imaging Personally Reviewed:  [ ] YES  [ ] NO    HEALTH ISSUES - PROBLEM Dx:

## 2025-03-29 NOTE — PROGRESS NOTE ADULT - ASSESSMENT
Ms. Moncada is an 89-year-old Slovenian-speaking female PMH HTN, CKD baseline Cr 1.9 admitted for subacute IPH s/p fall 2 weeks ago    #Subacute Intraparenchymal Hemorrhage possible HTN etiology  #s/p Fall ? unsure etiology mechanical vs cardiogenic  -CTH: L thalamus hyperdense consistent with IPH  -CXR: Rib fractures  -continue home amlodipine 2.5 mg qd, maintain SBP < 140  -hold home ASA and DVT ppx for now   -MRI Brain with and without contrast:   There is no evidence of acute infarct, intracranial hemorrhage, mass   effect or midline shift.  There is generalized cortical volume loss with commensurate ventricular   dilation. There is no hydrocephalus.  There is confluent periventricular as well as subcortical white matter T2   signal hyperintensity consistent with severe chronic microvascular type   changes. There is a chronic right basal ganglia lacunar infarct. There   are multiple foci of susceptibility artifact within the bilateral thalami   and basal ganglia consistent with chronic hemorrhages.  -Trauma team survey 3/25     #Acute Normocytic Anemia  -Hg 7.9 -> 6.1 repeat at 11 am was 8.4  > 8.7 today.   -No active signs of bleeding  -Pt on nasal cannula overnight  -continue to monitor , keep active type and screen    #Multiple Rib Fractures s/p fall   -Mildly displaced acute or subacute fractures of the right 2nd-7th   anterior ribs and left 5th and 6th anterior ribs.  - No definite evidence of acute traumatic injury within the abdomen or   pelvis.  -APS    # Possible Aspiration PNA/ Pneumonitis  #Small Pleural Effusion  -CXR: Small right pleural effusion and filling defect/secretions within the   right lower lobe segmental and subsegmental bronchi, with postobstructive   consolidation within the right lower lobe and patchy opacities within the   right upper lobe, possibly reflecting pneumonia in the appropriate   clinical setting.   -c/w ceftriaxone + azithromycin > would de escalate in 24-48 hrs   -procal 0.48 , BCx, >>NGTD   -aspiration precautions, S&S eval > noted >> pureed diet     #ZEKE on CKD    -Cr 3.7 from baseline 1.9 likely prerenal due to decreased po intake after fall  - currently at her baseline 1.9  - s/p 250 cc bolus LR,   -  resume IVF  D5ns @75 if PO intake remains poor.   -  trend Cr     #Stool [place]  -noted on CT  - pt having BM S/P enema    DVT prophylaxis: hold for IPH  GI prophylaxis: PPI   Diet: puree and mildly thickened as per speech and swallow  Code status: full code   Activity: as tolerated    PT > Max assist.     Pending: improved PO intake// placement  Plan dw daughter in law.   Dispo: possible SNF

## 2025-03-30 LAB
ANION GAP SERPL CALC-SCNC: 11 MMOL/L — SIGNIFICANT CHANGE UP (ref 7–14)
BUN SERPL-MCNC: 38 MG/DL — HIGH (ref 10–20)
CALCIUM SERPL-MCNC: 8.4 MG/DL — SIGNIFICANT CHANGE UP (ref 8.4–10.5)
CHLORIDE SERPL-SCNC: 105 MMOL/L — SIGNIFICANT CHANGE UP (ref 98–110)
CO2 SERPL-SCNC: 25 MMOL/L — SIGNIFICANT CHANGE UP (ref 17–32)
CREAT SERPL-MCNC: 1.7 MG/DL — HIGH (ref 0.7–1.5)
EGFR: 29 ML/MIN/1.73M2 — LOW
EGFR: 29 ML/MIN/1.73M2 — LOW
GLUCOSE SERPL-MCNC: 72 MG/DL — SIGNIFICANT CHANGE UP (ref 70–99)
HCT VFR BLD CALC: 28.1 % — LOW (ref 37–47)
HGB BLD-MCNC: 8.8 G/DL — LOW (ref 12–16)
MCHC RBC-ENTMCNC: 29.4 PG — SIGNIFICANT CHANGE UP (ref 27–31)
MCHC RBC-ENTMCNC: 31.3 G/DL — LOW (ref 32–37)
MCV RBC AUTO: 94 FL — SIGNIFICANT CHANGE UP (ref 81–99)
NRBC BLD AUTO-RTO: 0 /100 WBCS — SIGNIFICANT CHANGE UP (ref 0–0)
PLATELET # BLD AUTO: 442 K/UL — HIGH (ref 130–400)
PMV BLD: 9 FL — SIGNIFICANT CHANGE UP (ref 7.4–10.4)
POTASSIUM SERPL-MCNC: 4.1 MMOL/L — SIGNIFICANT CHANGE UP (ref 3.5–5)
POTASSIUM SERPL-SCNC: 4.1 MMOL/L — SIGNIFICANT CHANGE UP (ref 3.5–5)
RBC # BLD: 2.99 M/UL — LOW (ref 4.2–5.4)
RBC # FLD: 15.8 % — HIGH (ref 11.5–14.5)
SODIUM SERPL-SCNC: 141 MMOL/L — SIGNIFICANT CHANGE UP (ref 135–146)
WBC # BLD: 9.39 K/UL — SIGNIFICANT CHANGE UP (ref 4.8–10.8)
WBC # FLD AUTO: 9.39 K/UL — SIGNIFICANT CHANGE UP (ref 4.8–10.8)

## 2025-03-30 PROCEDURE — 99232 SBSQ HOSP IP/OBS MODERATE 35: CPT

## 2025-03-30 RX ORDER — SODIUM CHLORIDE 9 G/1000ML
1000 INJECTION, SOLUTION INTRAVENOUS
Refills: 0 | Status: DISCONTINUED | OUTPATIENT
Start: 2025-03-30 | End: 2025-04-01

## 2025-03-30 RX ORDER — AMLODIPINE BESYLATE 10 MG/1
2.5 TABLET ORAL DAILY
Refills: 0 | Status: DISCONTINUED | OUTPATIENT
Start: 2025-03-30 | End: 2025-04-03

## 2025-03-30 RX ADMIN — Medication 40 MILLIGRAM(S): at 05:44

## 2025-03-30 RX ADMIN — Medication 250 MILLIGRAM(S): at 01:55

## 2025-03-30 RX ADMIN — ATORVASTATIN CALCIUM 40 MILLIGRAM(S): 80 TABLET, FILM COATED ORAL at 22:06

## 2025-03-30 RX ADMIN — AMLODIPINE BESYLATE 2.5 MILLIGRAM(S): 10 TABLET ORAL at 10:12

## 2025-03-30 RX ADMIN — CEFTRIAXONE 100 MILLIGRAM(S): 500 INJECTION, POWDER, FOR SOLUTION INTRAMUSCULAR; INTRAVENOUS at 01:55

## 2025-03-30 RX ADMIN — Medication 2 TABLET(S): at 22:06

## 2025-03-30 RX ADMIN — Medication 1 APPLICATION(S): at 05:44

## 2025-03-30 RX ADMIN — SODIUM CHLORIDE 65 MILLILITER(S): 9 INJECTION, SOLUTION INTRAVENOUS at 10:12

## 2025-03-30 RX ADMIN — POLYETHYLENE GLYCOL 3350 17 GRAM(S): 17 POWDER, FOR SOLUTION ORAL at 09:36

## 2025-03-30 NOTE — PROGRESS NOTE ADULT - ATTENDING COMMENTS
89-year-old Lithuanian-speaking female PMH HTN, CKD(?) does not follow-up with any doctor admitted for IPH s/p fall     # Fall , unkown etiology, possible stroke   IPH   Multiple Rib Fx  -continue home amlodipine 2.5 mg qd, maintain SBP < 140  -hold home ASA and DVT ppx for now   -HOB > 45 degrees  -q8h neuro checks   -pain control  - neuro folllowup   - MRI brain     # suspected Aspiration PNA/Pneumonitis  cxr (03.24.25) Right basilar opacity/effusion.  -will treat empirically with ceftriaxone + azithromycin  -procal, BCx, U strep & legionella   -aspiration precautions, S&S eval was not able to assess pt due to poor participate /comprehension   - IVF today , reevaluate if pt is more alert     # ZEKE on CKD , baseline around 2   - s/p 2 L LR in ED  - repeat Cr in AM  - send urine studies  - us kidney and bladder     creatinine trend  3.4 (03-25-25 @ 05:34)  3.7 (03-24-25 @ 13:32)  2.1 mg/dL (07.08.24 @ 09:19)    #Progress Note Handoff    Pending :  mri , cr to improve   Family discussion: dw daughter in law at the bedside   Disposition: home with family vs snf, needs reeval by pt when more alert   code status: full code
Medicine Attending Attestation  Patient was seen and examined with medicine team.  Nursing records reviewed. I agree with the resident/PA/NP's note including past medical history, home medications, social history, allergies, surgical history, family history, and review of system. I have reviewed relevant vitals, laboratory values, imaging studies, and microbiology.   - Above resident's note was edited by me   - No overnight issue  - ZEKE improving  - dispo planning  - rest of A/P as per detailed housestaff note above except above modifications    Total time spent to complete patient's bedside assessment, reviewed medical chart, discussed medical plan of care with team was 45 minutes with >50% of time spent face to face with patient, discussion with patient/family and/or coordination of care.

## 2025-03-30 NOTE — PROGRESS NOTE ADULT - ASSESSMENT
Ms. Moncada is an 89-year-old Nepali-speaking female PMH HTN, CKD baseline Cr 1.9 admitted for subacute IPH s/p fall 3 weeks ago    #Subacute Intraparenchymal Hemorrhage possible HTN etiology  #s/p Fall ? unsure etiology mechanical vs cardiogenic  -CTH: L thalamus hyperdense consistent with IPH  -CXR: Rib fractures  -continue home amlodipine 2.5 mg qd, maintain SBP < 140  -hold home ASA and DVT ppx for now   -MRI Brain with and without contrast:   There is no evidence of acute infarct, intracranial hemorrhage, mass   effect or midline shift.  There is generalized cortical volume loss with commensurate ventricular   dilation. There is no hydrocephalus.  There is confluent periventricular as well as subcortical white matter T2   signal hyperintensity consistent with severe chronic microvascular type   changes. There is a chronic right basal ganglia lacunar infarct. There   are multiple foci of susceptibility artifact within the bilateral thalami   and basal ganglia consistent with chronic hemorrhages.  -Trauma team survey 3/25     #Acute Normocytic Anemia  -Hg 9.2 (3/30)  -No active signs of bleeding  -Pt not on nasal cannula overnight  -continue to monitor, keep active type and screen    #Multiple Rib Fractures s/p fall   -Mildly displaced acute or subacute fractures of the right 2nd-7th   anterior ribs and left 5th and 6th anterior ribs.  - No definite evidence of acute traumatic injury within the abdomen or   pelvis.  -APS    # Possible Aspiration PNA/ Pneumonitis  #Small Pleural Effusion  -CXR: Small right pleural effusion and filling defect/secretions within the   right lower lobe segmental and subsegmental bronchi, with postobstructive   consolidation within the right lower lobe and patchy opacities within the   right upper lobe, possibly reflecting pneumonia in the appropriate   clinical setting.   -c/w ceftriaxone + azithromycin > would de escalate in 24-48 hrs   -procal 0.48 , BCx, >>NGTD   -aspiration precautions, S&S eval > noted >> pureed diet     #ZEKE on CKD    -Cr 3.7 from baseline 1.9 likely prerenal due to decreased po intake after fall  - currently at her baseline 1.9 (3/30)  - s/p 250 cc bolus LR,   -  resume IVF  D5ns @75 if PO intake remains poor.   -  trend Cr     #Stool [place]  -noted on CT  - pt having BM S/P enema    DVT prophylaxis: hold for IPH  GI prophylaxis: PPI   Diet: puree and mildly thickened as per speech and swallow  Code status: full code   Activity: as tolerated    PT > Max assist.     Pending: improved PO intake// placement  Plan dw daughter in law.   Dispo: possible SNF    Ms. Moncada is an 89-year-old Mongolian-speaking female PMH HTN, CKD baseline Cr 1.9 admitted for subacute IPH s/p fall 3 weeks ago    #Subacute Intraparenchymal Hemorrhage possible HTN etiology  #s/p Fall ? unsure etiology mechanical vs cardiogenic  -CTH: L thalamus hyperdense consistent with IPH  -CXR: Rib fractures  -hold home ASA and DVT ppx for now   -MRI Brain with and without contrast:   There is no evidence of acute infarct, intracranial hemorrhage, mass effect or midline shift. There is generalized cortical volume loss with commensurate ventricular dilation. There is no hydrocephalus. There is confluent periventricular as well as subcortical white matter T2 signal hyperintensity consistent with severe chronic microvascular type changes. There is a chronic right basal ganglia lacunar infarct. There are multiple foci of susceptibility artifact within the bilateral thalami and basal ganglia consistent with chronic hemorrhages.  -Trauma team survey 3/25     #Acute Normocytic Anemia  - Hg 9.2 > 8.8 (3/30)  - No active signs of bleeding  - Pt not on nasal cannula overnight  -continue to monitor, keep active type and screen    #Multiple Rib Fractures s/p fall   - Mildly displaced acute or subacute fractures of the right 2nd-7th anterior ribs and left 5th and 6th anterior ribs.  - No definite evidence of acute traumatic injury within the abdomen or   pelvis.  - APS    # Possible Aspiration PNA/ Pneumonitis  #Small Pleural Effusion  -CXR: Small right pleural effusion and filling defect/secretions within the right lower lobe segmental and subsegmental bronchi, with postobstructive consolidation within the right lower lobe and patchy opacities within the right upper lobe, possibly reflecting pneumonia in the appropriate clinical setting.   -c/w ceftriaxone + azithromycin > would de escalate in 24-48 hrs   -procal 0.48 , BCx, >>NGTD   -aspiration precautions, S&S eval > noted >> pureed diet     #ZEKE on CKD    - Cr 3.7 from baseline 1.9 likely prerenal due to decreased po intake after fall  - currently at her baseline 1.9 (3/30)  - s/p 250 cc bolus LR  -  resume LR @ 75 for 24 hours as pt has PO intake  -  trend Cr     #HTN  - C/w home med Amlodipine 2.5 mg qd, maintain SBP < 140    #Stool [place]  -noted on CT  - pt having BM S/P enema    DVT prophylaxis: hold for IPH  GI prophylaxis: PPI   Diet: puree and mildly thickened as per speech and swallow  Code status: full code   Activity: as tolerated    PT > Max assist.     Pending: improved PO intake// placement  Plan dw daughter in law.   Dispo: possible SNF    Ms. Moncada is an 89-year-old Albanian-speaking female PMH HTN, CKD baseline Cr 1.9 admitted for subacute IPH s/p fall 3 weeks ago    #Subacute Intraparenchymal Hemorrhage possible HTN etiology  #s/p Fall ? unsure etiology mechanical vs cardiogenic  -CTH: L thalamus hyperdense consistent with IPH  -CXR: Rib fractures  -hold home ASA and DVT ppx for now   -MRI Brain with and without contrast:   There is no evidence of acute infarct, intracranial hemorrhage, mass effect or midline shift. There is generalized cortical volume loss with commensurate ventricular dilation. There is no hydrocephalus. There is confluent periventricular as well as subcortical white matter T2 signal hyperintensity consistent with severe chronic microvascular type changes. There is a chronic right basal ganglia lacunar infarct. There are multiple foci of susceptibility artifact within the bilateral thalami and basal ganglia consistent with chronic hemorrhages.  -Trauma team survey 3/25     #Acute Normocytic Anemia  - Hg 9.2 > 8.8 (3/30)  - No active signs of bleeding  - Pt not on nasal cannula overnight  -continue to monitor, keep active type and screen    #Multiple Rib Fractures s/p fall   - Mildly displaced acute or subacute fractures of the right 2nd-7th anterior ribs and left 5th and 6th anterior ribs.  - No definite evidence of acute traumatic injury within the abdomen or   pelvis.  - APS    # Possible Aspiration PNA/ Pneumonitis  #Small Pleural Effusion  -CXR: Small right pleural effusion and filling defect/secretions within the right lower lobe segmental and subsegmental bronchi, with postobstructive consolidation within the right lower lobe and patchy opacities within the right upper lobe, possibly reflecting pneumonia in the appropriate clinical setting.   -c/w ceftriaxone + azithromycin > would de escalate in 24-48 hrs   -procal 0.48 , BCx, >>NGTD   -aspiration precautions, S&S eval > noted >> pureed diet     #ZEKE on CKD    - Cr 3.7 from baseline 1.9 likely prerenal due to decreased po intake after fall  - currently at her baseline 1.9 (3/30)  - s/p 250 cc bolus LR  -  resume LR @ 65 for 24 hours as pt has PO intake  -  trend Cr     #HTN  - C/w home med Amlodipine 2.5 mg qd, maintain SBP < 140    #Stool [place]  -noted on CT  - pt having BM S/P enema    DVT prophylaxis: hold for IPH  GI prophylaxis: PPI   Diet: puree and mildly thickened as per speech and swallow  Code status: full code   Activity: as tolerated    PT > Max assist.     Pending: improved PO intake// placement  Plan dw daughter in law.   Dispo: possible SNF    Ms. Moncada is an 89-year-old Serbian-speaking female PMH HTN, CKD baseline Cr 1.9 admitted for subacute IPH s/p fall 3 weeks ago    #Subacute Intraparenchymal Hemorrhage possible HTN etiology  #s/p Fall ? unsure etiology mechanical vs cardiogenic  -CTH: L thalamus hyperdense consistent with IPH  -CXR: Rib fractures  -hold home ASA and DVT ppx for now   -MRI Brain with and without contrast:   There is no evidence of acute infarct, intracranial hemorrhage, mass effect or midline shift. There is generalized cortical volume loss with commensurate ventricular dilation. There is no hydrocephalus. There is confluent periventricular as well as subcortical white matter T2 signal hyperintensity consistent with severe chronic microvascular type changes. There is a chronic right basal ganglia lacunar infarct. There are multiple foci of susceptibility artifact within the bilateral thalami and basal ganglia consistent with chronic hemorrhages.  -Trauma team survey 3/25     #Acute Normocytic Anemia  - Hg 9.2 > 8.8 (3/30)  - No active signs of bleeding  - Pt not on nasal cannula overnight  -continue to monitor, keep active type and screen    #Multiple Rib Fractures s/p fall   - Mildly displaced acute or subacute fractures of the right 2nd-7th anterior ribs and left 5th and 6th anterior ribs.  - No definite evidence of acute traumatic injury within the abdomen or   pelvis.  - APS    # Possible Aspiration PNA/ Pneumonitis  #Small Pleural Effusion  -CXR: Small right pleural effusion and filling defect/secretions within the right lower lobe segmental and subsegmental bronchi, with postobstructive consolidation within the right lower lobe and patchy opacities within the right upper lobe, possibly reflecting pneumonia in the appropriate clinical setting.   -c/w ceftriaxone + azithromycin > would de escalate in 24-48 hrs   -procal 0.48 , BCx, >>NGTD   -aspiration precautions, S&S eval > noted >> pureed diet     #ZKEE on CKD    - Cr 3.7 from baseline 1.9 likely prerenal due to decreased po intake after fall  - currently at her baseline 1.9 (3/30)  - s/p 250 cc bolus LR  -  resume LR @ 65 for 24 hours as pt has PO intake  -  trend Cr     #HTN  - C/w home med Amlodipine 2.5 mg qd, maintain SBP < 140    #Stool [place]  -noted on CT  - pt having BM S/P enema    DVT prophylaxis: hold for IPH  GI prophylaxis: PPI   Diet: puree and mildly thickened as per speech and swallow  Code status: full code   Activity: as tolerated    PT > Max assist.     Pending: improved PO intake// placement  Plan dw daughter in law.   Dispo: possible SNF

## 2025-03-30 NOTE — PROGRESS NOTE ADULT - SUBJECTIVE AND OBJECTIVE BOX
SUBJECTIVE/OVERNIGHT EVENTS  Today is hospital day 5d. This morning patient was seen and examined at bedside, resting comfortably in bed. No acute or major events overnight.      MEDICATIONS  STANDING MEDICATIONS  atorvastatin 40 milliGRAM(s) Oral at bedtime  azithromycin  IVPB      azithromycin  IVPB 500 milliGRAM(s) IV Intermittent every 24 hours  chlorhexidine 2% Cloths 1 Application(s) Topical <User Schedule>  lactated ringers. 1000 milliLiter(s) IV Continuous <Continuous>  pantoprazole    Tablet 40 milliGRAM(s) Oral before breakfast  polyethylene glycol 3350 17 Gram(s) Oral every 24 hours  senna 2 Tablet(s) Oral at bedtime    PRN MEDICATIONS  acetaminophen     Tablet .. 650 milliGRAM(s) Oral every 6 hours PRN  aluminum hydroxide/magnesium hydroxide/simethicone Suspension 30 milliLiter(s) Oral every 4 hours PRN  bisacodyl Suppository 10 milliGRAM(s) Rectal daily PRN  melatonin 3 milliGRAM(s) Oral at bedtime PRN    VITALS  T(F): 98.9 (03-30-25 @ 04:43), Max: 98.9 (03-30-25 @ 04:43)  HR: 60 (03-30-25 @ 04:43) (60 - 98)  BP: 154/68 (03-30-25 @ 04:43) (102/62 - 154/68)  RR: 18 (03-30-25 @ 04:43) (16 - 18)  SpO2: 94% (03-30-25 @ 04:43) (92% - 94%)    PHYSICAL EXAM  GENERAL  (x  ) NAD, lying in bed comfortably     (  ) obtunded     (  ) lethargic     (  ) somnolent    HEAD  (x  ) Atraumatic     (  ) hematoma     (  ) laceration (specify location:       )     NECK  ( x ) Supple     (  ) neck stiffness     (  ) nuchal rigidity     (  )  no JVD     (  ) JVD present ( -- cm)    HEART  Rate -->  ( x ) normal rate    (  ) bradycardic    (  ) tachycardic  Rhythm -->  (x  ) regular    (  ) regularly irregular    (  ) irregularly irregular  Murmurs -->  (x  ) normal s1/s2    (  ) systolic murmur    (  ) diastolic murmur    (  ) continuous murmur     (  ) S3 present    (  ) S4 present    LUNGS  (x  )Unlabored respirations     (  ) tachypnea  ( x ) B/L air entry     (  ) decreased breath sounds in:  (location     )    (  ) no adventitious sound     (  ) crackles     (  ) wheezing      (  ) rhonchi      (specify location:       )  (  ) chest wall tenderness (specify location:       )    ABDOMEN  ( x ) Soft     (  ) tense   |   (  ) nondistended     (  ) distended   |   (  ) +BS     (  ) hypoactive bowel sounds     (  ) hyperactive bowel sounds  ( x ) nontender     (  ) RUQ tenderness     (  ) RLQ tenderness     (  ) LLQ tenderness     (  ) epigastric tenderness     (  ) diffuse tenderness  (  ) Splenomegaly      (  ) Hepatomegaly      (  ) Jaundice     (  ) ecchymosis     EXTREMITIES  (  ) Normal     (  ) Rash     (  ) ecchymosis     (  ) varicose veins      (  ) pitting edema     (  ) non-pitting edema   (  ) ulceration     (  ) gangrene:     (location:     )    NERVOUS SYSTEM  (  ) A&Ox3     (  ) confused     (x  ) lethargic  CN II-XII:     (  ) Intact     (  ) focal deficits  (Specify:     )   Upper extremities:     (  ) strength X/5     (  ) focal deficit (specify:    )  Lower extremities:     (  ) strength  X/5    (  ) focal deficit (specify:    )    SKIN  (  ) No rashes or lesions     (  ) maculopapular rash     (  ) pustules     (  ) vesicles     (  ) ulcer     (  ) ecchymosis     (specify location:     )    (  ) Indwelling Desai Catheter   Date insterted:    Reason (  ) Critical illness     (  ) urinary retention    (  ) Accurate Ins/Outs Monitoring     (  ) CMO patient    (  ) Central Line  Date inserted:  Location: (  ) Right IJ   (  ) Left IJ   (  ) Right Fem   (  ) Left Fem    (  ) SPC  (  ) pigtail  (  ) PEG tube  (  ) colostomy  (  ) jejunostomy  (  ) U-Dall    LABS             9.2    8.09  )-----------( 421      ( 03-29-25 @ 07:17 )             28.9     139  |  103  |  45  -------------------------<  75   03-29-25 @ 07:17  4.3  |  22  |  1.9    Ca      8.3     03-29-25 @ 07:17          Urinalysis Basic - ( 29 Mar 2025 07:17 )    Color: x / Appearance: x / SG: x / pH: x  Gluc: 75 mg/dL / Ketone: x  / Bili: x / Urobili: x   Blood: x / Protein: x / Nitrite: x   Leuk Esterase: x / RBC: x / WBC x   Sq Epi: x / Non Sq Epi: x / Bacteria: x          IMAGING

## 2025-03-31 LAB
ANION GAP SERPL CALC-SCNC: 13 MMOL/L — SIGNIFICANT CHANGE UP (ref 7–14)
BUN SERPL-MCNC: 29 MG/DL — HIGH (ref 10–20)
CALCIUM SERPL-MCNC: 8.3 MG/DL — LOW (ref 8.4–10.5)
CHLORIDE SERPL-SCNC: 103 MMOL/L — SIGNIFICANT CHANGE UP (ref 98–110)
CO2 SERPL-SCNC: 22 MMOL/L — SIGNIFICANT CHANGE UP (ref 17–32)
CREAT SERPL-MCNC: 1.5 MG/DL — SIGNIFICANT CHANGE UP (ref 0.7–1.5)
EGFR: 33 ML/MIN/1.73M2 — LOW
EGFR: 33 ML/MIN/1.73M2 — LOW
GLUCOSE BLDC GLUCOMTR-MCNC: 88 MG/DL — SIGNIFICANT CHANGE UP (ref 70–99)
GLUCOSE BLDC GLUCOMTR-MCNC: 88 MG/DL — SIGNIFICANT CHANGE UP (ref 70–99)
GLUCOSE SERPL-MCNC: 63 MG/DL — LOW (ref 70–99)
HCT VFR BLD CALC: 26.7 % — LOW (ref 37–47)
HGB BLD-MCNC: 8.5 G/DL — LOW (ref 12–16)
MCHC RBC-ENTMCNC: 29.5 PG — SIGNIFICANT CHANGE UP (ref 27–31)
MCHC RBC-ENTMCNC: 31.8 G/DL — LOW (ref 32–37)
MCV RBC AUTO: 92.7 FL — SIGNIFICANT CHANGE UP (ref 81–99)
NRBC BLD AUTO-RTO: 0 /100 WBCS — SIGNIFICANT CHANGE UP (ref 0–0)
PLATELET # BLD AUTO: 400 K/UL — SIGNIFICANT CHANGE UP (ref 130–400)
PMV BLD: 8.8 FL — SIGNIFICANT CHANGE UP (ref 7.4–10.4)
POTASSIUM SERPL-MCNC: 4.2 MMOL/L — SIGNIFICANT CHANGE UP (ref 3.5–5)
POTASSIUM SERPL-SCNC: 4.2 MMOL/L — SIGNIFICANT CHANGE UP (ref 3.5–5)
RBC # BLD: 2.88 M/UL — LOW (ref 4.2–5.4)
RBC # FLD: 15.7 % — HIGH (ref 11.5–14.5)
SODIUM SERPL-SCNC: 138 MMOL/L — SIGNIFICANT CHANGE UP (ref 135–146)
TSH SERPL-MCNC: 1.33 UIU/ML — SIGNIFICANT CHANGE UP (ref 0.27–4.2)
WBC # BLD: 8.93 K/UL — SIGNIFICANT CHANGE UP (ref 4.8–10.8)
WBC # FLD AUTO: 8.93 K/UL — SIGNIFICANT CHANGE UP (ref 4.8–10.8)

## 2025-03-31 PROCEDURE — 99232 SBSQ HOSP IP/OBS MODERATE 35: CPT

## 2025-03-31 RX ORDER — MEGESTROL ACETATE 40 MG
20 TABLET ORAL DAILY
Refills: 0 | Status: DISCONTINUED | OUTPATIENT
Start: 2025-03-31 | End: 2025-04-01

## 2025-03-31 RX ORDER — DRONABINOL 10 MG/1
2.5 CAPSULE ORAL
Refills: 0 | Status: DISCONTINUED | OUTPATIENT
Start: 2025-03-31 | End: 2025-03-31

## 2025-03-31 RX ORDER — DRONABINOL 10 MG/1
2.5 CAPSULE ORAL DAILY
Refills: 0 | Status: DISCONTINUED | OUTPATIENT
Start: 2025-03-31 | End: 2025-03-31

## 2025-03-31 RX ORDER — MEGESTROL ACETATE 40 MG
400 TABLET ORAL DAILY
Refills: 0 | Status: DISCONTINUED | OUTPATIENT
Start: 2025-03-31 | End: 2025-03-31

## 2025-03-31 RX ADMIN — Medication 1 APPLICATION(S): at 05:28

## 2025-03-31 RX ADMIN — Medication 400 MILLIGRAM(S): at 16:53

## 2025-03-31 RX ADMIN — Medication 2 TABLET(S): at 21:27

## 2025-03-31 RX ADMIN — Medication 250 MILLIGRAM(S): at 04:46

## 2025-03-31 RX ADMIN — ATORVASTATIN CALCIUM 40 MILLIGRAM(S): 80 TABLET, FILM COATED ORAL at 21:27

## 2025-03-31 RX ADMIN — AMLODIPINE BESYLATE 2.5 MILLIGRAM(S): 10 TABLET ORAL at 05:29

## 2025-03-31 RX ADMIN — Medication 650 MILLIGRAM(S): at 10:45

## 2025-03-31 RX ADMIN — Medication 40 MILLIGRAM(S): at 11:20

## 2025-03-31 RX ADMIN — POLYETHYLENE GLYCOL 3350 17 GRAM(S): 17 POWDER, FOR SOLUTION ORAL at 11:20

## 2025-03-31 NOTE — PROGRESS NOTE ADULT - SUBJECTIVE AND OBJECTIVE BOX
SUBJECTIVE/OVERNIGHT EVENTS  Today is hospital day 6d. This morning patient was seen and examined at bedside, resting comfortably in bed. No acute or major events overnight.        MEDICATIONS  STANDING MEDICATIONS  amLODIPine   Tablet 2.5 milliGRAM(s) Oral daily  atorvastatin 40 milliGRAM(s) Oral at bedtime  azithromycin  IVPB      azithromycin  IVPB 500 milliGRAM(s) IV Intermittent every 24 hours  chlorhexidine 2% Cloths 1 Application(s) Topical <User Schedule>  dronabinol 2.5 milliGRAM(s) Oral daily  lactated ringers. 1000 milliLiter(s) IV Continuous <Continuous>  lactated ringers. 1000 milliLiter(s) IV Continuous <Continuous>  pantoprazole  Injectable 40 milliGRAM(s) IV Push daily  polyethylene glycol 3350 17 Gram(s) Oral every 24 hours  senna 2 Tablet(s) Oral at bedtime    PRN MEDICATIONS  acetaminophen     Tablet .. 650 milliGRAM(s) Oral every 6 hours PRN  aluminum hydroxide/magnesium hydroxide/simethicone Suspension 30 milliLiter(s) Oral every 4 hours PRN  bisacodyl Suppository 10 milliGRAM(s) Rectal daily PRN  melatonin 3 milliGRAM(s) Oral at bedtime PRN    VITALS  T(F): 97.9 (03-31-25 @ 05:00), Max: 97.9 (03-31-25 @ 05:00)  HR: 106 (03-31-25 @ 05:00) (52 - 106)  BP: 146/78 (03-31-25 @ 05:00) (94/54 - 146/82)  RR: 18 (03-31-25 @ 05:00) (16 - 18)  SpO2: 99% (03-31-25 @ 05:00) (93% - 99%)    PHYSICAL EXAM  GENERAL  (x  ) NAD, lying in bed comfortably     (  ) obtunded     (  ) lethargic     (  ) somnolent    HEAD  (x  ) Atraumatic     (  ) hematoma     (  ) laceration (specify location:       )     NECK  ( x ) Supple     (  ) neck stiffness     (  ) nuchal rigidity     (  )  no JVD     (  ) JVD present ( -- cm)    HEART  Rate -->  (x  ) normal rate    (  ) bradycardic    (  ) tachycardic  Rhythm -->  ( x ) regular    (  ) regularly irregular    (  ) irregularly irregular  Murmurs -->  ( x ) normal s1/s2    (  ) systolic murmur    (  ) diastolic murmur    (  ) continuous murmur     (  ) S3 present    (  ) S4 present    LUNGS  ( x )Unlabored respirations     (  ) tachypnea  (  ) B/L air entry     (  ) decreased breath sounds in:  (location     )    (  ) no adventitious sound     (  ) crackles     (  ) wheezing      (  ) rhonchi      (specify location:       )  (  ) chest wall tenderness (specify location:       )    ABDOMEN  ( x ) Soft     (  ) tense   |   ( x ) nondistended     (  ) distended   |   (  ) +BS     (  ) hypoactive bowel sounds     (  ) hyperactive bowel sounds  (  ) nontender     (  ) RUQ tenderness     (  ) RLQ tenderness     (  ) LLQ tenderness     (  ) epigastric tenderness     (  ) diffuse tenderness  (  ) Splenomegaly      (  ) Hepatomegaly      (  ) Jaundice     (  ) ecchymosis     EXTREMITIES  (  ) Normal     (  ) Rash     (  ) ecchymosis     (  ) varicose veins      (  ) pitting edema     (  ) non-pitting edema   (  ) ulceration     (  ) gangrene:     (location:     )    NERVOUS SYSTEM  (  ) A&Ox3     (  ) confused     (  ) lethargic  CN II-XII:     (  ) Intact     (  ) focal deficits  (Specify:     )   Upper extremities:     (  ) strength X/5     (  ) focal deficit (specify:    )  Lower extremities:     (  ) strength  X/5    (  ) focal deficit (specify:    )    SKIN  (  ) No rashes or lesions     (  ) maculopapular rash     (  ) pustules     (  ) vesicles     (  ) ulcer     (  ) ecchymosis     (specify location:     )    (  ) Indwelling Desai Catheter   Date insterted:    Reason (  ) Critical illness     (  ) urinary retention    (  ) Accurate Ins/Outs Monitoring     (  ) CMO patient    (  ) Central Line  Date inserted:  Location: (  ) Right IJ   (  ) Left IJ   (  ) Right Fem   (  ) Left Fem    (  ) SPC  (  ) pigtail  (  ) PEG tube  (  ) colostomy  (  ) jejunostomy  (  ) U-Dall    LABS             8.5    8.93  )-----------( 400      ( 03-31-25 @ 07:30 )             26.7     138  |  103  |  29  -------------------------<  63   03-31-25 @ 07:30  4.2  |  22  |  1.5    Ca      8.3     03-31-25 @ 07:30          Urinalysis Basic - ( 31 Mar 2025 07:30 )    Color: x / Appearance: x / SG: x / pH: x  Gluc: 63 mg/dL / Ketone: x  / Bili: x / Urobili: x   Blood: x / Protein: x / Nitrite: x   Leuk Esterase: x / RBC: x / WBC x   Sq Epi: x / Non Sq Epi: x / Bacteria: x          IMAGING

## 2025-03-31 NOTE — PROGRESS NOTE ADULT - SUBJECTIVE AND OBJECTIVE BOX
pt seen and examined.     My notes supersede resident's notes in case of discrepancy       ROS: no cp, no sob, no n/v, no fever    Vital Signs Last 24 Hrs  T(C): 36.3 (31 Mar 2025 11:57), Max: 36.6 (31 Mar 2025 05:00)  T(F): 97.4 (31 Mar 2025 11:57), Max: 97.9 (31 Mar 2025 05:00)  HR: 89 (31 Mar 2025 11:57) (89 - 106)  BP: 120/66 (31 Mar 2025 11:57) (120/66 - 146/82)  RR: 16 (31 Mar 2025 11:57) (16 - 18)  SpO2: 96% (31 Mar 2025 11:57) (93% - 99%)    Parameters below as of 30 Mar 2025 19:30  Patient On (Oxygen Delivery Method): room air    physical exam  constitutional NAD, AAOX3, Respiratory  lungs CTA, CVS heart RRR, GI: abdomen Soft NT, ND, BS+, skin: intact  neuro exam no focal deficit     MEDICATIONS  (STANDING):  amLODIPine   Tablet 2.5 milliGRAM(s) Oral daily  atorvastatin 40 milliGRAM(s) Oral at bedtime  azithromycin  IVPB      azithromycin  IVPB 500 milliGRAM(s) IV Intermittent every 24 hours  chlorhexidine 2% Cloths 1 Application(s) Topical <User Schedule>  lactated ringers. 1000 milliLiter(s) (65 mL/Hr) IV Continuous <Continuous>  lactated ringers. 1000 milliLiter(s) (65 mL/Hr) IV Continuous <Continuous>  megestrol Suspension 400 milliGRAM(s) Oral daily  pantoprazole  Injectable 40 milliGRAM(s) IV Push daily  polyethylene glycol 3350 17 Gram(s) Oral every 24 hours  senna 2 Tablet(s) Oral at bedtime    MEDICATIONS  (PRN):  acetaminophen     Tablet .. 650 milliGRAM(s) Oral every 6 hours PRN Temp greater or equal to 38C (100.4F), Mild Pain (1 - 3)  aluminum hydroxide/magnesium hydroxide/simethicone Suspension 30 milliLiter(s) Oral every 4 hours PRN Dyspepsia  bisacodyl Suppository 10 milliGRAM(s) Rectal daily PRN Constipation  melatonin 3 milliGRAM(s) Oral at bedtime PRN Insomnia                        8.5    8.93  )-----------( 400      ( 31 Mar 2025 07:30 )             26.7     03-31    138  |  103  |  29[H]  ----------------------------<  63[L]  4.2   |  22  |  1.5    Ca    8.3[L]      31 Mar 2025 07:30    Procalcitonin: 0.48 ng/mL [0.02 - 0.10] (03-25-25 @ 05:34)    A/P  89-year-old Upper sorbian-speaking female PMH HTN, CKD(?) does not follow-up with any doctor presented to the ED for decreased p.o. intake, bedbound last 2 weeks.     # Fall , unknown etiology  poor mental status  eeg  adde    # suspected Aspiration PNA/Pneumonitis  cxr (03.24.25) Right basilar opacity/effusion.  sp empirical treatment  with ceftriaxone + azithromycin    # mild hypoglycemia in bmp, possible due to poor intake  add   monitor  fingerstick     # ZEKE on CKD , improved, now zeke resolved   - s/p 2 L LR in ED  - repeat Cr in AM  - send urine studies  - us kidney and bladder     creatinine trend  1.5 (03-31-25 @ 07:30)  1.7 (03-30-25 @ 07:43)  1.9 (03-29-25 @ 07:17)  2.2 (03-28-25 @ 06:25)  2.1 (03-27-25 @ 11:36)    #Progress Note Handoff    Pending :  eeg, anticipate dc in am   Family discussion: dw daughter-in-law at the bedside   Disposition: possible snf   code status: full code

## 2025-03-31 NOTE — PROGRESS NOTE ADULT - ASSESSMENT
Ms. Moncada is an 89-year-old Bengali-speaking female PMH HTN, CKD baseline Cr 1.9 admitted for subacute IPH s/p fall 3 weeks ago    #Subacute Intraparenchymal Hemorrhage possible HTN etiology  #s/p Fall ? unsure etiology mechanical vs cardiogenic  -CTH: L thalamus hyperdense consistent with IPH  -CXR: Rib fractures  -hold home ASA and DVT ppx for now   -MRI Brain with and without contrast:   There is no evidence of acute infarct, intracranial hemorrhage, mass effect or midline shift. There is generalized cortical volume loss with commensurate ventricular dilation. There is no hydrocephalus. There is confluent periventricular as well as subcortical white matter T2 signal hyperintensity consistent with severe chronic microvascular type changes. There is a chronic right basal ganglia lacunar infarct. There are multiple foci of susceptibility artifact within the bilateral thalami and basal ganglia consistent with chronic hemorrhages.  -Trauma team survey 3/25   - Check TSH  - EEG    #Acute Normocytic Anemia  - Hg 9.2 > 8.8 (3/30)  - No active signs of bleeding  - Pt not on nasal cannula overnight  -continue to monitor, keep active type and screen    #Multiple Rib Fractures s/p fall   - Mildly displaced acute or subacute fractures of the right 2nd-7th anterior ribs and left 5th and 6th anterior ribs.  - No definite evidence of acute traumatic injury within the abdomen or   pelvis.  - APS    # Possible Aspiration PNA/ Pneumonitis  #Small Pleural Effusion  -CXR: Small right pleural effusion and filling defect/secretions within the right lower lobe segmental and subsegmental bronchi, with postobstructive consolidation within the right lower lobe and patchy opacities within the right upper lobe, possibly reflecting pneumonia in the appropriate clinical setting.   -c/w ceftriaxone + azithromycin > would de escalate in 24-48 hrs   -procal 0.48 , BCx, >>NGTD   -aspiration precautions, S&S eval > noted >> pureed diet > encourage drinking ensure      #ZEKE on CKD    - Cr 3.7 from baseline 1.9 likely prerenal due to decreased po intake after fall  - currently at her baseline 1.9 (3/30)  - s/p 250 cc bolus LR  -  resume LR @ 65 for 24 hours as pt has PO intake  -  trend Cr     #HTN  - C/w home med Amlodipine 2.5 mg qd, maintain SBP < 140    #Stool [place]  -noted on CT  - pt having BM S/P enema    DVT prophylaxis: hold for IPH  GI prophylaxis: PPI   Diet: puree and mildly thickened as per speech and swallow. Ensure ordered.  - Marinol  Code status: full code   Activity: as tolerated    PT > Max assist.     Pending: improved PO intake// placement  Plan dw daughter in law.   Dispo: possible SNF

## 2025-04-01 ENCOUNTER — TRANSCRIPTION ENCOUNTER (OUTPATIENT)
Age: 89
End: 2025-04-01

## 2025-04-01 DIAGNOSIS — Z71.89 OTHER SPECIFIED COUNSELING: ICD-10-CM

## 2025-04-01 DIAGNOSIS — Z51.5 ENCOUNTER FOR PALLIATIVE CARE: ICD-10-CM

## 2025-04-01 DIAGNOSIS — I61.9 NONTRAUMATIC INTRACEREBRAL HEMORRHAGE, UNSPECIFIED: ICD-10-CM

## 2025-04-01 LAB
ALBUMIN SERPL ELPH-MCNC: 2.5 G/DL — LOW (ref 3.5–5.2)
ALP SERPL-CCNC: 125 U/L — HIGH (ref 30–115)
ALT FLD-CCNC: 6 U/L — SIGNIFICANT CHANGE UP (ref 0–41)
ANION GAP SERPL CALC-SCNC: 12 MMOL/L — SIGNIFICANT CHANGE UP (ref 7–14)
AST SERPL-CCNC: 16 U/L — SIGNIFICANT CHANGE UP (ref 0–41)
BILIRUB SERPL-MCNC: 0.3 MG/DL — SIGNIFICANT CHANGE UP (ref 0.2–1.2)
BUN SERPL-MCNC: 28 MG/DL — HIGH (ref 10–20)
CALCIUM SERPL-MCNC: 7.9 MG/DL — LOW (ref 8.4–10.5)
CHLORIDE SERPL-SCNC: 101 MMOL/L — SIGNIFICANT CHANGE UP (ref 98–110)
CO2 SERPL-SCNC: 23 MMOL/L — SIGNIFICANT CHANGE UP (ref 17–32)
CREAT SERPL-MCNC: 1.5 MG/DL — SIGNIFICANT CHANGE UP (ref 0.7–1.5)
EGFR: 33 ML/MIN/1.73M2 — LOW
EGFR: 33 ML/MIN/1.73M2 — LOW
GLUCOSE BLDC GLUCOMTR-MCNC: 74 MG/DL — SIGNIFICANT CHANGE UP (ref 70–99)
GLUCOSE BLDC GLUCOMTR-MCNC: 76 MG/DL — SIGNIFICANT CHANGE UP (ref 70–99)
GLUCOSE BLDC GLUCOMTR-MCNC: 91 MG/DL — SIGNIFICANT CHANGE UP (ref 70–99)
GLUCOSE BLDC GLUCOMTR-MCNC: 93 MG/DL — SIGNIFICANT CHANGE UP (ref 70–99)
GLUCOSE SERPL-MCNC: 68 MG/DL — LOW (ref 70–99)
HCT VFR BLD CALC: 25.7 % — LOW (ref 37–47)
HGB BLD-MCNC: 8.1 G/DL — LOW (ref 12–16)
MCHC RBC-ENTMCNC: 29.1 PG — SIGNIFICANT CHANGE UP (ref 27–31)
MCHC RBC-ENTMCNC: 31.5 G/DL — LOW (ref 32–37)
MCV RBC AUTO: 92.4 FL — SIGNIFICANT CHANGE UP (ref 81–99)
NRBC BLD AUTO-RTO: 0 /100 WBCS — SIGNIFICANT CHANGE UP (ref 0–0)
PLATELET # BLD AUTO: 367 K/UL — SIGNIFICANT CHANGE UP (ref 130–400)
PMV BLD: 8.8 FL — SIGNIFICANT CHANGE UP (ref 7.4–10.4)
POTASSIUM SERPL-MCNC: 4 MMOL/L — SIGNIFICANT CHANGE UP (ref 3.5–5)
POTASSIUM SERPL-SCNC: 4 MMOL/L — SIGNIFICANT CHANGE UP (ref 3.5–5)
PROT SERPL-MCNC: 5.4 G/DL — LOW (ref 6–8)
RBC # BLD: 2.78 M/UL — LOW (ref 4.2–5.4)
RBC # FLD: 15.5 % — HIGH (ref 11.5–14.5)
SODIUM SERPL-SCNC: 136 MMOL/L — SIGNIFICANT CHANGE UP (ref 135–146)
WBC # BLD: 8.41 K/UL — SIGNIFICANT CHANGE UP (ref 4.8–10.8)
WBC # FLD AUTO: 8.41 K/UL — SIGNIFICANT CHANGE UP (ref 4.8–10.8)

## 2025-04-01 PROCEDURE — 99222 1ST HOSP IP/OBS MODERATE 55: CPT

## 2025-04-01 PROCEDURE — 99232 SBSQ HOSP IP/OBS MODERATE 35: CPT

## 2025-04-01 RX ORDER — MEGESTROL ACETATE 40 MG
10 TABLET ORAL
Qty: 0 | Refills: 0 | DISCHARGE
Start: 2025-04-01

## 2025-04-01 RX ORDER — MEGESTROL ACETATE 40 MG
400 TABLET ORAL
Refills: 0 | Status: DISCONTINUED | OUTPATIENT
Start: 2025-04-01 | End: 2025-04-03

## 2025-04-01 RX ADMIN — Medication 400 MILLIGRAM(S): at 16:23

## 2025-04-01 RX ADMIN — AMLODIPINE BESYLATE 2.5 MILLIGRAM(S): 10 TABLET ORAL at 05:03

## 2025-04-01 RX ADMIN — Medication 250 MILLIGRAM(S): at 00:57

## 2025-04-01 RX ADMIN — ATORVASTATIN CALCIUM 40 MILLIGRAM(S): 80 TABLET, FILM COATED ORAL at 21:01

## 2025-04-01 RX ADMIN — Medication 1 APPLICATION(S): at 05:03

## 2025-04-01 RX ADMIN — Medication 2 TABLET(S): at 21:01

## 2025-04-01 RX ADMIN — Medication 40 MILLIGRAM(S): at 12:23

## 2025-04-01 RX ADMIN — POLYETHYLENE GLYCOL 3350 17 GRAM(S): 17 POWDER, FOR SOLUTION ORAL at 09:58

## 2025-04-01 NOTE — PROGRESS NOTE ADULT - SUBJECTIVE AND OBJECTIVE BOX
pt seen and examined.     My notes supersede resident's notes in case of discrepancy       ROS: no cp, no sob, no n/v, no fever    Vital Signs Last 24 Hrs  T(C): 36.6 (01 Apr 2025 12:00), Max: 36.6 (01 Apr 2025 12:00)  T(F): 97.8 (01 Apr 2025 12:00), Max: 97.8 (01 Apr 2025 12:00)  HR: 99 (01 Apr 2025 12:00) (90 - 99)  BP: 128/74 (01 Apr 2025 12:00) (124/75 - 130/68)  BP(mean): --  RR: 16 (01 Apr 2025 12:00) (16 - 18)  SpO2: 95% (01 Apr 2025 12:00) (94% - 100%)    Parameters below as of 31 Mar 2025 21:00  Patient On (Oxygen Delivery Method): room air    physical exam  constitutional NAD, sleepy ,, Respiratory  lungs CTA, CVS heart RRR, GI: abdomen Soft NT, ND, BS+, skin: intact  neuro exam no focal deficit     MEDICATIONS  (STANDING):  amLODIPine   Tablet 2.5 milliGRAM(s) Oral daily  atorvastatin 40 milliGRAM(s) Oral at bedtime  azithromycin  IVPB      azithromycin  IVPB 500 milliGRAM(s) IV Intermittent every 24 hours  chlorhexidine 2% Cloths 1 Application(s) Topical <User Schedule>  lactated ringers. 1000 milliLiter(s) (65 mL/Hr) IV Continuous <Continuous>  lactated ringers. 1000 milliLiter(s) (65 mL/Hr) IV Continuous <Continuous>  megestrol Suspension 400 milliGRAM(s) Oral <User Schedule>  pantoprazole  Injectable 40 milliGRAM(s) IV Push daily  polyethylene glycol 3350 17 Gram(s) Oral every 24 hours  senna 2 Tablet(s) Oral at bedtime    MEDICATIONS  (PRN):  acetaminophen     Tablet .. 650 milliGRAM(s) Oral every 6 hours PRN Temp greater or equal to 38C (100.4F), Mild Pain (1 - 3)  aluminum hydroxide/magnesium hydroxide/simethicone Suspension 30 milliLiter(s) Oral every 4 hours PRN Dyspepsia   bisacodyl Suppository 10 milliGRAM(s) Rectal daily PRN Constipation  melatonin 3 milliGRAM(s) Oral at bedtime PRN Insomnia                        8.1    8.41  )-----------( 367      ( 01 Apr 2025 07:10 )             25.7     04-01    136  |  101  |  28[H]  ----------------------------<  68[L]  4.0   |  23  |  1.5    Ca    7.9[L]      01 Apr 2025 07:10    TPro  5.4[L]  /  Alb  2.5[L]  /  TBili  0.3  /  DBili  x   /  AST  16  /  ALT  6   /  AlkPhos  125[H]  04-01    Procalcitonin: 0.48 ng/mL [0.02 - 0.10] (03-25-25 @ 05:34)    a/p      89-year-old Amharic-speaking female PMH HTN, CKD(?) does not follow-up with any doctor presented to the ED for decreased p.o. intake, bedbound last 2 weeks.     # Fall , unknown etiology  poor mental status  eeg    # suspected Aspiration PNA/Pneumonitis  cxr (03.24.25) Right basilar opacity/effusion.  sp empirical treatment  with ceftriaxone + azithromycin    # mild hypoglycemia in bmp, possible due to poor intake, low albumin, severe protein calorie malnutrition , and dehydration poa   added megace ( marinol cannot be crushed and pt cannot swallow the capsule )   monitor  fingerstick   cont supplement feeding, 1:1 feeding     # ZEKE on CKD , improved, now zeke resolved   - s/p fluids     creatinine trend  1.5 (04-01-25 @ 07:10)  2.2 (03-28-25 @ 06:25)  3.7 mg/dL (03.24.25 @ 13:32)    1.9 mg/dL (07.07.24 @ 08:17)    #Progress Note Handoff    Pending :  discharge planning   Family discussion: resident updating family   Disposition: possible snf   code status: full code

## 2025-04-01 NOTE — CONSULT NOTE ADULT - SUBJECTIVE AND OBJECTIVE BOX
HPI: 89F with PMH including HTN, CKD, here with decreased PO intake, nonverbal status, bedbound. Found to have a subacute IPH, and had a fall three weeks ago; unclear if etiology is mechanical vs cardiogenic. Also noted to have normocytic anemia, and multiple rib fractures after fall.  Is on IV abx for possible aspiration PNA as well. SCr is elevated from baseline, attributed to poorer PO intake. Full code. Palliative c/s for GOC.    PERTINENT PM/SXH:   No pertinent past medical history        FAMILY HISTORY:  no pertinent family history      SOCIAL HISTORY:   Significant other/partner[ ]  Children[ ]  Nondenominational/Spirituality:  Substance hx:  [ ]   Tobacco hx:  [ ]   Alcohol hx: [ ]   Living Situation: [ ]Home  [ ]Long term care  [ ]Rehab [ ]Other  Home Services: [ ] HHA [ ] Visting RN [ ] Hospice  Occupation:  Home Opioid hx:  [ ] Y [ ] N [ ] I-Stop Reference No:    ADVANCE DIRECTIVES:    [ ] Full Code [ ] DNR  MOLST  [ ]  Living Will  [ ]   DECISION MAKER(s):  [ ] Health Care Proxy(s)  [ ] Surrogate(s)  [ ] Guardian           Name(s): Phone Number(s):    BASELINE (I)ADL(s) (prior to admission):  [place]: [ ]Total  [ ] Moderate [ ]Dependent  Palliative Performance Status Version 2:         %    http://npcrc.org/files/news/palliative_performance_scale_ppsv2.pdf    Allergies    No Known Allergies    Intolerances    MEDICATIONS  (STANDING):  amLODIPine   Tablet 2.5 milliGRAM(s) Oral daily  atorvastatin 40 milliGRAM(s) Oral at bedtime  azithromycin  IVPB      azithromycin  IVPB 500 milliGRAM(s) IV Intermittent every 24 hours  chlorhexidine 2% Cloths 1 Application(s) Topical <User Schedule>  lactated ringers. 1000 milliLiter(s) (65 mL/Hr) IV Continuous <Continuous>  lactated ringers. 1000 milliLiter(s) (65 mL/Hr) IV Continuous <Continuous>  megestrol Suspension 400 milliGRAM(s) Oral <User Schedule>  pantoprazole  Injectable 40 milliGRAM(s) IV Push daily  polyethylene glycol 3350 17 Gram(s) Oral every 24 hours  senna 2 Tablet(s) Oral at bedtime    MEDICATIONS  (PRN):  acetaminophen     Tablet .. 650 milliGRAM(s) Oral every 6 hours PRN Temp greater or equal to 38C (100.4F), Mild Pain (1 - 3)  aluminum hydroxide/magnesium hydroxide/simethicone Suspension 30 milliLiter(s) Oral every 4 hours PRN Dyspepsia  bisacodyl Suppository 10 milliGRAM(s) Rectal daily PRN Constipation  melatonin 3 milliGRAM(s) Oral at bedtime PRN Insomnia      PRESENT SYMPTOMS: [ ]Unable to obtain due to poor mentation   Source if other than patient:  [ ]Family   [ ]Team   [ X]All review of systems negative including pain and dyspnea unless noted below    All components of pain assessment below addressed. Blank spaces indicate that the patient did/could not complete the assessment.  Pain: [ ]yes [ ]no  QOL impact -   Location -                    Aggravating factors -  Quality -  Radiation -  Timing-  Severity (0-10 scale):  Minimal acceptable level (0-10 scale):     CPOT:    https://www.[facility].org/getattachment/[email or web address]/Critical-Care-Pain-Observation-Tool-(CPOT)    PAIN AD Score:   http://geriatrictoolkit.[facility]/cog/painad.pdf (press ctrl +  left click to view)    Dyspnea:                           [ ]None[ ]Mild [ ]Moderate [ ]Severe     Respiratory Distress Observation Scale (RDOS):   A score of 0 to 2 signifies little or no respiratory distress, 3 signifies mild distress, scores 4 to 6 indicate moderate distress, and scores greater than 7 signify severe distress  https://www.[facility].ca/sites/default/files/PDFS/006359-hygvpmzaesf-etpkvzib-bekwbfbnbbc-zqaje.pdf    Anxiety:                             [ ]None[ ]Mild [ ]Moderate [ ]Severe   Fatigue:                             [ ]None[ ]Mild [ ]Moderate [ ]Severe   Nausea:                             [ ]None[ ]Mild [ ]Moderate [ ]Severe   Loss of appetite:              [ ]None[ ]Mild [ ]Moderate [ ]Severe   Constipation:                    [ ]None[ ]Mild [ ]Moderate [ ]Severe    Other Symptoms:    PHYSICAL EXAM:  Vital Signs Last 24 Hrs  T(C): 36.4 (01 Apr 2025 04:53), Max: 36.4 (31 Mar 2025 21:00)  T(F): 97.5 (01 Apr 2025 04:53), Max: 97.6 (31 Mar 2025 21:00)  HR: 98 (01 Apr 2025 04:53) (89 - 98)  BP: 124/75 (01 Apr 2025 04:53) (120/66 - 130/68)  BP(mean): --  RR: 18 (01 Apr 2025 04:53) (16 - 18)  SpO2: 100% (01 Apr 2025 04:53) (94% - 100%)    Parameters below as of 31 Mar 2025 21:00  Patient On (Oxygen Delivery Method): room air     I&O's Summary      GENERAL:  [X ] No acute distress [ ]Lethargic  [ ]Unarousable  [ ]Verbal  [ ]Non-Verbal [ ]Cachexia    BEHAVIORAL/PSYCH:  [ ]Alert and Oriented x  [ ] Anxiety [ ] Delirium [ ] Agitation [X ] Calm   EYES: [ ] No scleral icterus [ ] Scleral icterus [ ] Closed  ENMT:  [ ]Dry mouth  [ ]No external oral lesions [ X] No external ear or nose lesions  CARDIOVASCULAR:  [ ]Regular [ ]Irregular [ ]Tachy [ ]Not Tachy  [ ]Maxime [ ] Edema [ ] No edema  PULMONARY:  [ ]Tachypnea  [ ]Audible excessive secretions [X ] No labored breathing [ ] labored breathing  GASTROINTESTINAL: [ ]Soft  [ ]Distended  [ X]Not distended [ ]Non tender [ ]Tender  MUSCULOSKELETAL: [ ]No clubbing [ ] clubbing  [ X] No cyanosis [ ] cyanosis  NEUROLOGIC: [ ]No focal deficits  [ ]Follows commands  [ ]Does not follow commands  [ ]Cognitive impairment  [ ]Dysphagia  [ ]Dysarthria  [ ]Paresis   SKIN: [ ] Jaundiced [X ] Non-jaundiced [ ]Rash [ ]No Rash [ ] Warm [ ] Dry  MISC/LINES: [ ] ET tube [ ] Trach [ ]NGT/OGT [ ]PEG [ ]Desai    CRITICAL CARE:  [ ] Shock Present  [ ]Septic [ ]Cardiogenic [ ]Neurologic [ ]Hypovolemic  [ ]  Vasopressors [ ]  Inotropes   [ ]Respiratory failure present [ ]Mechanical ventilation [ ]Non-invasive ventilatory support [ ]High flow  [ ]Acute  [ ]Chronic [ ]Hypoxic  [ ]Hypercarbic [ ]Other  [ ]Other organ failure     LABS: reviewed by me, notable for: CBC, BMP, UA WNL                        8.1    8.41  )-----------( 367      ( 01 Apr 2025 07:10 )             25.7   04-01    136  |  101  |  28[H]  ----------------------------<  68[L]  4.0   |  23  |  1.5    Ca    7.9[L]      01 Apr 2025 07:10    TPro  5.4[L]  /  Alb  2.5[L]  /  TBili  0.3  /  DBili  x   /  AST  16  /  ALT  6   /  AlkPhos  125[H]  04-01      Urinalysis Basic - ( 01 Apr 2025 07:10 )    Color: x / Appearance: x / SG: x / pH: x  Gluc: 68 mg/dL / Ketone: x  / Bili: x / Urobili: x   Blood: x / Protein: x / Nitrite: x   Leuk Esterase: x / RBC: x / WBC x   Sq Epi: x / Non Sq Epi: x / Bacteria: x      RADIOLOGY & ADDITIONAL STUDIES: CXR personally reviewed by me: 3/25: R effusion    PROTEIN CALORIE MALNUTRITION PRESENT: [ ]mild [ ]moderate [ ]severe [ ]underweight [ ]morbid obesity  https://www.andeal.org/vault/2440/web/files/ONC/Table_Clinical%20Characteristics%20to%20Document%20Malnutrition-White%20JV%20et%20al%202012.pdf    Height (cm): 157.5 (03-27-25 @ 11:42), 157.5 (07-05-24 @ 13:31)  Weight (kg): 46.2 (03-24-25 @ 17:28), 59 (07-05-24 @ 13:31)  BMI (kg/m2): 18.6 (03-27-25 @ 11:42), 18.6 (03-24-25 @ 17:28), 23.8 (07-05-24 @ 13:31)    [ ]PPSV2 < or = to 30% [ ]significant weight loss  [ ]poor nutritional intake  [ ]anasarca      [ ]Artificial Nutrition          Palliative Care Spiritual/Emotional Screening Tool Question  Severity (0-4):                    OR                    [X ] Unable to determine/NA  Score of 2 or greater indicates recommendation of Chaplaincy referral  Chaplaincy Referral: [ ] Yes [ ] Refused [ ] Following     Caregiver [place]:  [ ] Yes [ ] No    OR    [x ] Unable to determine. Will assess at later time if appropriate.  Social Work Referral [ ]  Patient and Family Centered Care Referral [ ]    Anticipatory Grief Present: [ ] Yes [ ] No    OR     [ x] Unable to determine. Will assess at later time if appropriate.  Social Work Referral [ ]  Patient and Family Centered Care Referral [ ]    REFERRALS:   [ ]Chaplaincy  [ ]Hospice  [ ]Child Life  [ ]Social Work  [ ]Case management [ ]Holistic Therapy     Palliative care education provided to patient and/or family    Goals of Care Document:     ______________  Freddy Ayers MD  Palliative Medicine  [facility]   of Geriatric and Palliative Medicine  [phone]   HPI: 89F with PMH including HTN, CKD, here with decreased PO intake. Found to have a subacute IPH, and had a fall three weeks ago; unclear if etiology is mechanical vs cardiogenic. Also noted to have normocytic anemia, and multiple rib fractures after fall.  Is on IV abx for possible aspiration PNA as well. SCr is elevated from baseline, attributed to poorer PO intake. Full code. Palliative c/s for GOC.    PERTINENT PM/SXH:   No pertinent past medical history    FAMILY HISTORY:  no pertinent family history      SOCIAL HISTORY:   Significant other/partner[ ]  Children[X ]  Religious/Spirituality:  Substance hx:  [ ]   Tobacco hx:  [ ]   Alcohol hx: [ ]   Living Situation: [X]Home  [ ]Long term care  [ ]Rehab [ ]Other  Home Services: [ ] HHA [ ] Jairo RN [ ] Hospice  Occupation:  Home Opioid hx:  [ ] Y [ ] N [ ] I-Stop Reference No:    ADVANCE DIRECTIVES:    [X ] Full Code [ ] DNR  MOLST  [ ]  Living Will  [ ]   DECISION MAKER(s):  [ ] Health Care Proxy(s)  [ ] Surrogate(s)  [ ] Guardian           Name(s): Phone Number(s): flaco Ruiz    BASELINE (I)ADL(s) (prior to admission):  [place]: [ ]Total  [ ] Moderate [ ]Dependent  Palliative Performance Status Version 2:  40%    http://npcrc.org/files/news/palliative_performance_scale_ppsv2.pdf    Allergies    No Known Allergies    Intolerances    MEDICATIONS  (STANDING):  amLODIPine   Tablet 2.5 milliGRAM(s) Oral daily  atorvastatin 40 milliGRAM(s) Oral at bedtime  azithromycin  IVPB      azithromycin  IVPB 500 milliGRAM(s) IV Intermittent every 24 hours  chlorhexidine 2% Cloths 1 Application(s) Topical <User Schedule>  lactated ringers. 1000 milliLiter(s) (65 mL/Hr) IV Continuous <Continuous>  lactated ringers. 1000 milliLiter(s) (65 mL/Hr) IV Continuous <Continuous>  megestrol Suspension 400 milliGRAM(s) Oral <User Schedule>  pantoprazole  Injectable 40 milliGRAM(s) IV Push daily  polyethylene glycol 3350 17 Gram(s) Oral every 24 hours  senna 2 Tablet(s) Oral at bedtime    MEDICATIONS  (PRN):  acetaminophen     Tablet .. 650 milliGRAM(s) Oral every 6 hours PRN Temp greater or equal to 38C (100.4F), Mild Pain (1 - 3)  aluminum hydroxide/magnesium hydroxide/simethicone Suspension 30 milliLiter(s) Oral every 4 hours PRN Dyspepsia  bisacodyl Suppository 10 milliGRAM(s) Rectal daily PRN Constipation  melatonin 3 milliGRAM(s) Oral at bedtime PRN Insomnia      PRESENT SYMPTOMS: [ X]Unable to obtain due to poor mentation   Source if other than patient:  [ ]Family   [ ]Team   [ X]All review of systems negative including pain and dyspnea unless noted below    All components of pain assessment below addressed. Blank spaces indicate that the patient did/could not complete the assessment.  Pain: [ ]yes [ ]no  QOL impact -   Location -                    Aggravating factors -  Quality -  Radiation -  Timing-  Severity (0-10 scale):  Minimal acceptable level (0-10 scale):     CPOT:    https://www.[facility].org/getattachment/[email or web address]/Critical-Care-Pain-Observation-Tool-(CPOT)    PAIN AD Score:   http://geriatrictoolkit.[facility]/cog/painad.pdf (press ctrl +  left click to view)    Dyspnea:                           [ ]None[ ]Mild [ ]Moderate [ ]Severe     Respiratory Distress Observation Scale (RDOS):   A score of 0 to 2 signifies little or no respiratory distress, 3 signifies mild distress, scores 4 to 6 indicate moderate distress, and scores greater than 7 signify severe distress  https://www.[facility].ca/sites/default/files/PDFS/879294-blecfyuyayj-vpbrnhmd-jaqeqbhsfur-plrwo.pdf    Anxiety:                             [ ]None[ ]Mild [ ]Moderate [ ]Severe   Fatigue:                             [ ]None[ ]Mild [ ]Moderate [ ]Severe   Nausea:                             [ ]None[ ]Mild [ ]Moderate [ ]Severe   Loss of appetite:              [ ]None[ ]Mild [ ]Moderate [ ]Severe   Constipation:                    [ ]None[ ]Mild [ ]Moderate [ ]Severe    Other Symptoms:    PHYSICAL EXAM:  Vital Signs Last 24 Hrs  T(C): 36.4 (01 Apr 2025 04:53), Max: 36.4 (31 Mar 2025 21:00)  T(F): 97.5 (01 Apr 2025 04:53), Max: 97.6 (31 Mar 2025 21:00)  HR: 98 (01 Apr 2025 04:53) (89 - 98)  BP: 124/75 (01 Apr 2025 04:53) (120/66 - 130/68)  BP(mean): --  RR: 18 (01 Apr 2025 04:53) (16 - 18)  SpO2: 100% (01 Apr 2025 04:53) (94% - 100%)    Parameters below as of 31 Mar 2025 21:00  Patient On (Oxygen Delivery Method): room air     I&O's Summary      GENERAL:  [X ] No acute distress [ ]Lethargic  [ ]Unarousable  [ ]Verbal  [ ]Non-Verbal [ ]Cachexia    BEHAVIORAL/PSYCH:  [ ]Alert and Oriented x  [ ] Anxiety [ ] Delirium [ ] Agitation [X ] Calm   EYES: [X ] No scleral icterus [ ] Scleral icterus [ ] Closed  ENMT:  [ ]Dry mouth  [ ]No external oral lesions [ X] No external ear or nose lesions  CARDIOVASCULAR:  [ ]Regular [ ]Irregular [ ]Tachy [ ]Not Tachy  [ ]Maxime [ ] Edema [ ] No edema  PULMONARY:  [ ]Tachypnea  [ ]Audible excessive secretions [X ] No labored breathing [ ] labored breathing  GASTROINTESTINAL: [ ]Soft  [ ]Distended  [ X]Not distended [ ]Non tender [ ]Tender  MUSCULOSKELETAL: [ ]No clubbing [ ] clubbing  [ X] No cyanosis [ ] cyanosis  NEUROLOGIC: [ ]No focal deficits  [ ]Follows commands  [ ]Does not follow commands  [X ]Cognitive impairment  [ ]Dysphagia  [ ]Dysarthria  [ ]Paresis   SKIN: [ ] Jaundiced [X ] Non-jaundiced [ ]Rash [ ]No Rash [ ] Warm [ ] Dry  MISC/LINES: [ ] ET tube [ ] Trach [ ]NGT/OGT [ ]PEG [ ]Desai    CRITICAL CARE:  [ ] Shock Present  [ ]Septic [ ]Cardiogenic [ ]Neurologic [ ]Hypovolemic  [ ]  Vasopressors [ ]  Inotropes   [ ]Respiratory failure present [ ]Mechanical ventilation [ ]Non-invasive ventilatory support [ ]High flow  [ ]Acute  [ ]Chronic [ ]Hypoxic  [ ]Hypercarbic [ ]Other  [ ]Other organ failure     LABS: reviewed by me, notable for: CBC, BMP, UA WNL                        8.1    8.41  )-----------( 367      ( 01 Apr 2025 07:10 )             25.7   04-01    136  |  101  |  28[H]  ----------------------------<  68[L]  4.0   |  23  |  1.5    Ca    7.9[L]      01 Apr 2025 07:10    TPro  5.4[L]  /  Alb  2.5[L]  /  TBili  0.3  /  DBili  x   /  AST  16  /  ALT  6   /  AlkPhos  125[H]  04-01      Urinalysis Basic - ( 01 Apr 2025 07:10 )    Color: x / Appearance: x / SG: x / pH: x  Gluc: 68 mg/dL / Ketone: x  / Bili: x / Urobili: x   Blood: x / Protein: x / Nitrite: x   Leuk Esterase: x / RBC: x / WBC x   Sq Epi: x / Non Sq Epi: x / Bacteria: x      RADIOLOGY & ADDITIONAL STUDIES: CXR personally reviewed by me: 3/25: R effusion    PROTEIN CALORIE MALNUTRITION PRESENT: [ ]mild [ ]moderate [ ]severe [ ]underweight [ ]morbid obesity  https://www.andeal.org/vault/2440/web/files/ONC/Table_Clinical%20Characteristics%20to%20Document%20Malnutrition-White%20JV%20et%20al%202012.pdf    Height (cm): 157.5 (03-27-25 @ 11:42), 157.5 (07-05-24 @ 13:31)  Weight (kg): 46.2 (03-24-25 @ 17:28), 59 (07-05-24 @ 13:31)  BMI (kg/m2): 18.6 (03-27-25 @ 11:42), 18.6 (03-24-25 @ 17:28), 23.8 (07-05-24 @ 13:31)    [ ]PPSV2 < or = to 30% [ ]significant weight loss  [ ]poor nutritional intake  [ ]anasarca      [ ]Artificial Nutrition          Palliative Care Spiritual/Emotional Screening Tool Question  Severity (0-4):                    OR                    [X ] Unable to determine/NA  Score of 2 or greater indicates recommendation of Chaplaincy referral  Chaplaincy Referral: [ ] Yes [ ] Refused [ ] Following     Caregiver [place]:  [ ] Yes [ ] No    OR    [x ] Unable to determine. Will assess at later time if appropriate.  Social Work Referral [ ]  Patient and Family Centered Care Referral [ ]    Anticipatory Grief Present: [ ] Yes [ ] No    OR     [ x] Unable to determine. Will assess at later time if appropriate.  Social Work Referral [ ]  Patient and Family Centered Care Referral [ ]    REFERRALS:   [ ]Chaplaincy  [ ]Hospice  [ ]Child Life  [ ]Social Work  [ ]Case management [ ]Holistic Therapy     Palliative care education provided to patient and/or family    Goals of Care Document:     ______________  Freddy Ayers MD  Palliative Medicine  [facility]   of Geriatric and Palliative Medicine  [phone]

## 2025-04-01 NOTE — DISCHARGE NOTE PROVIDER - CARE PROVIDER_API CALL
Geovanni Sotelo  Neurology  [address], [address]  [place] [address]  Phone: [phone]  Fax: [phone]  Follow Up Time: 2 weeks    Your primary doctor,   Phone: (   )    -  Fax: (   )    -  Follow Up Time: 1 week    Herbert Shetty  Neurosurgery  [address] [place] [address], [address]  [place] [address]  Phone: [phone]  Fax: [phone]  Follow Up Time: 1 week

## 2025-04-01 NOTE — DISCHARGE NOTE PROVIDER - NSDCCPCAREPLAN_GEN_ALL_CORE_FT
PRINCIPAL DISCHARGE DIAGNOSIS  Diagnosis: Hemorrhage in the brain  Assessment and Plan of Treatment: You were admitted for a brain hemorrhage possibly from the fall at home and also found that you also had aspiration pneumonia. Neurology and neurosurgery said nothing to do for the brain bleed. You were given antibiotics for the pneumonia. Continue taking any medications as prescribed by your healthcare provider, and make sure to follow a diet of pureed and mildly thickened foods as recommended by the speech and swallow team to reduce the risk of aspiration. It's crucial to avoid hasty movements that may lead to falls or exacerbate vascular conditions—use mobility aids as needed and employ fall-prevention strategies, such as ensuring your environment is free from hazards like clutter or loose rugs. Coughing or choking while eating should be minimized by maintaining an upright position during meals and eating slowly. It's also advisable to abstain from tobacco and excessive alcohol use, which can worsen respiratory and cerebral health.  Arrange follow-up appointments with your Primary Care Physician (PCP), neurosurgeon, and neurologist as soon as possible to monitor your progress and address any underlying conditions that may contribute to intraparenchymal hemorrhage risk. During these visits, discuss any changes to medication or dietary restrictions that are necessary to support your recovery and prevent future incidents.  Be vigilant for symptoms that may indicate worsening conditions or complications, and seek immediate medical attention if you experience sudden or severe headaches, dizziness, changes in vision, any neurological deficits such as weakness or numbness, difficulty breathing, persistent coughing, or any sign of re-bleeding, such as a severe increase in headache or confusion.      SECONDARY DISCHARGE DIAGNOSES  Diagnosis: Acute renal failure  Assessment and Plan of Treatment:     Diagnosis: Fall  Assessment and Plan of Treatment:     Diagnosis: Fracture of multiple ribs of right side  Assessment and Plan of Treatment:     Diagnosis: Fracture of multiple ribs of left side  Assessment and Plan of Treatment:     Diagnosis: Pleural effusion, right  Assessment and Plan of Treatment:

## 2025-04-01 NOTE — DISCHARGE NOTE PROVIDER - NSDCFUADDINST_GEN_ALL_CORE_FT
Ensure Pudding Cans or Servings Per Day:  1       Frequency:  Two Times a day  Ensure Plus High Protein Cans or Servings Per Day:  1       Frequency:  Three Times a day

## 2025-04-01 NOTE — DISCHARGE NOTE PROVIDER - CARE PROVIDERS DIRECT ADDRESSES
[email or web address]

## 2025-04-01 NOTE — CHART NOTE - NSCHARTNOTEFT_GEN_A_CORE
Registered Dietitian Follow-Up     Patient Profile Reviewed                           Yes [x]   No []     Nutrition History Previously Obtained        Yes [x]  No []       Pertinent Subjective Information: Pt w/ continued poor po intake. marinol being subbed out for megace for ease of palatability for pt.      Pertinent Medical Interventions:  # mild hypoglycemia in bmp, possible due to poor intake, low albumin, severe protein calorie malnutrition , and dehydration poa   # suspected Aspiration PNA/Pneumonitis    Diet order: Diet, Pureed:   Supplement Feeding Modality:  Oral  Ensure Pudding Cans or Servings Per Day:  1       Frequency:  Two Times a day  Ensure Plus High Protein Cans or Servings Per Day:  1       Frequency:  Three Times a day (03-31-25 @ 10:09) [Active]    Anthropometrics:   Height (cm): 157.5 (03-27-25)  Weight (kg): 46.2 (03-24-25)  BMI (kg/m2): 18.6 (03-27-25)  IBW: 110 lbs per HAMWI equation  UBW: krzysztof    Daily   % Weight Change    MEDICATIONS  (STANDING):  amLODIPine   Tablet 2.5 milliGRAM(s) Oral daily  atorvastatin 40 milliGRAM(s) Oral at bedtime  chlorhexidine 2% Cloths 1 Application(s) Topical <User Schedule>  megestrol Suspension 400 milliGRAM(s) Oral <User Schedule>  pantoprazole  Injectable 40 milliGRAM(s) IV Push daily  polyethylene glycol 3350 17 Gram(s) Oral every 24 hours  senna 2 Tablet(s) Oral at bedtime    MEDICATIONS  (PRN):  acetaminophen     Tablet .. 650 milliGRAM(s) Oral every 6 hours PRN Temp greater or equal to 38C (100.4F), Mild Pain (1 - 3)  bisacodyl Suppository 10 milliGRAM(s) Rectal daily PRN Constipation  melatonin 3 milliGRAM(s) Oral at bedtime PRN Insomnia    Pertinent Labs: 04-01 @ 07:10: Na 136, BUN 28[H], Cr 1.5, BG 68[L], K+ 4.0, Phos --, Mg --, Alk Phos 125[H], ALT/SGPT 6, AST/SGOT 16, HbA1c --    Finger Sticks:  POCT Blood Glucose.: 74 mg/dL (04-01 @ 11:20)  POCT Blood Glucose.: 76 mg/dL (04-01 @ 07:24)  POCT Blood Glucose.: 88 mg/dL (03-31 @ 20:53)  POCT Blood Glucose.: 88 mg/dL (03-31 @ 17:25)    Physical Findings:  - Appearance: Aox1 baseline  - GI function:  Last BM 3/29   - Tubes: n/a   - Oral/Mouth cavity:  per SLP 3/28- Continue with a puree diet w/thin liquids  - Edema: 3/29 - none noted   - Skin: 3/29- unstageable sacral ulcer     Nutrition Requirements:  Weight Used: 46.2 kg      Energy: MSJ x 1.2-1.5 = 5534-1699   kcal/kg   Protein: 1.2-1.5 gm/kg = 55-70 gm protein /day  Fluid: 1 ml/kcal or per MD recommendations     Estimated energy and protein needs with consideration for  BMI, age, mobility, malnutrition, wound, and comorbidities.     Nutrient Intake: 0-25% meals     [x] Previous Nutrition Diagnosis: Malnutrition... severe r/t Poor PO intake 2/2 unknown etiology Aeb -21% wt loss in 8 months              [x] Ongoing          [] Resolved    [] No active nutrition diagnosis identified at this time     Nutrition Education: deferred      Goal/Expected Outcome: Pt to eat 50-75% of 4-6 small frequent meals in next 3-5 days.      Indicator/Monitoring: weekly anthroprometrics, GI S/S, -Lytes (Phos, Mag, K+), BM, I/Os, Labs, NFPF, GI fxn. Energy intake and tolerance.     Recommendation:   1. c/w  appetite stimulant megace   2. encourage PO intake w/ 1:1 feeds  3. Mon BMs     Dahlia Coronado x [phone] or Via TEAMS    high risk Follow Up .

## 2025-04-01 NOTE — CONSULT NOTE ADULT - ASSESSMENT
89F with PMH including HTN, CKD, here with decreased PO intake, nonverbal status, bedbound. Found to have a subacute IPH, and had a fall three weeks ago; unclear if etiology is mechanical vs cardiogenic. Also noted to have normocytic anemia, and multiple rib fractures after fall.  Is on IV abx for possible aspiration PNA as well. SCr is elevated from baseline, attributed to poorer PO intake. Full code. Palliative c/s for GOC. 89F with PMH including HTN, CKD, here with decreased PO intake. Found to have a subacute IPH, and had a fall three weeks ago; unclear if etiology is mechanical vs cardiogenic. Also noted to have normocytic anemia, and multiple rib fractures after fall.  Is on IV abx for possible aspiration PNA as well. SCr is elevated from baseline, attributed to poorer PO intake. Full code. Palliative c/s for GOC.    - patient comfortable, no major pain  - see GOC above  - goals are clear, full measures, hopeful for recovery at [facility] and return home with family support  - will sign off, reconsult PRN    ______________  Freddy Ayers MD  Palliative Medicine  [facility]   [facility] Ger[facility] Pall[facility]  [phone]

## 2025-04-01 NOTE — DISCHARGE NOTE PROVIDER - PROVIDER TOKENS
PROVIDER:[TOKEN:[[id number]],FOLLOWUP:[2 weeks]],FREE:[LAST:[Your primary doctor],PHONE:[(   )    -],FAX:[(   )    -],FOLLOWUP:[1 week]],PROVIDER:[TOKEN:[[id number]],FOLLOWUP:[1 week]]

## 2025-04-01 NOTE — DISCHARGE NOTE PROVIDER - HOSPITAL COURSE
Ms. Moncada is an 89-year-old Serbian-speaking female with a past medical history of hypertension and possible chronic kidney disease, who does not follow up with any physician regularly. She presented to the emergency department with decreased oral intake, nonverbal status, and has been bedbound for the last two weeks. According to her family, she lives alone with the help of home nursing care and typically ambulates with a walker. Approximately two weeks ago, she experienced a mechanical fall while attempting to use her walker and was found after an estimated downtime of one to two hours. Initial x-rays performed by home health services showed no concerning injuries, yet the patient has remained nonverbal, though still aware, with minimal food and fluid intake since the incident.    Her family reports no shortness of breath, chest pain, fevers, chills, nausea, vomiting, or diarrhea.     Vitals:  HR  Tachy to 100, otherwise WNL    LABS                           9.7    12.98 )-----------( 541    MCV 91.8                 30.4     136  |  99  |  113  ----------------------------( 91      AG: x   5.3   |  19  |  3.7    Ca: 8.8   Phos: x    Mg: x     Protein, Total: 6.9 / Albumin: 3.2 /  T. Bili 0.4 / D. Bili x  /  AST 22 /  ALT 9  /      VBG pH: 7.29  /  pCO2: 43    /  pO2: 13    / HCO3: 21    /  Lactate: 2.6      Trop 189 >> 174       Imaging studies revealed a 0.7 cm hyperdensity in the left thalamus consistent with a subacute intraparenchymal hemorrhage, possibly of hypertensive origin that was stable on repeat imaging. NSGY recommended no acute intervention and OP f/u. Neuro cleared for resumption of DVT ppx and decreased neurochecks to q8hr.  CT imaging of the chest, abdomen, and pelvis indicated mildly displaced fractures of the ribs, a dilated stool-filled rectosigmoid colon, and a small right pleural effusion with secretions in the right lung lobes suggestive of possible aspiration pneumonia or pneumonitis.   Pt was treated for PNA w/ ceftriaxone and azithromycin, later de-escalating to azithromycin monotherapy  ZEKE initially characterized by a creatinine spike from a baseline of 1.9 to 3.7, normalized to baseline with LR bolus and maintenance fluids   Pt was given enemas due to constipation which resolved.   Given swallowing difficulties ISO dec PO intake, pt was given megace to support her nutritional intake.    PT cleared for DC by Dr. Wang     #Subacute Intraparenchymal Hemorrhage possible HTN etiology  #s/p Fall ? unsure etiology mechanical vs cardiogenic  -CTH: L thalamus hyperdense consistent with IPH  -CXR: Rib fractures  -hold home ASA and DVT ppx for now   -MRI Brain with and without contrast:   There is no evidence of acute infarct, intracranial hemorrhage, mass effect or midline shift. There is generalized cortical volume loss with commensurate ventricular dilation. There is no hydrocephalus. There is confluent periventricular as well as subcortical white matter T2 signal hyperintensity consistent with severe chronic microvascular type changes. There is a chronic right basal ganglia lacunar infarct. There are multiple foci of susceptibility artifact within the bilateral thalami and basal ganglia consistent with chronic hemorrhages.  -Trauma team survey 3/25   -TSH WNL    #Acute Normocytic Anemia  - Hg 9.2 > 8.8 (3/30)  - No active signs of bleeding    #Multiple Rib Fractures s/p fall   - Mildly displaced acute or subacute fractures of the right 2nd-7th anterior ribs and left 5th and 6th anterior ribs.  - No definite evidence of acute traumatic injury within the abdomen or   pelvis.  - APS    # Possible Aspiration PNA/ Pneumonitis  #Small Pleural Effusion  -CXR: Small right pleural effusion and filling defect/secretions within the right lower lobe segmental and subsegmental bronchi, with postobstructive consolidation within the right lower lobe and patchy opacities within the right upper lobe, possibly reflecting pneumonia in the appropriate clinical setting.   -c/w ceftriaxone + azithromycin > deescalated to azithromycin only   -procal 0.48 , BCx, >>NGTD   -aspiration precautions, S&S eval > noted >> pureed diet > encourage drinking ensure    #ZEKE on CKD    - Cr 3.7 from baseline 1.9 likely prerenal due to decreased po intake after fall  - currently at her baseline 1.9 (3/30)  - s/p 250 cc bolus LR  -  s/p LR @ 65 for 24 hours as pt has PO intake    #HTN  - C/w home med Amlodipine 2.5 mg qd, maintain SBP < 140    #Stool [place]  -noted on CT  - pt having BM S/P enema Ms. Moncada is an 89-year-old Nepali-speaking female with a past medical history of hypertension and possible chronic kidney disease, who does not follow up with any physician regularly. She presented to the emergency department with decreased oral intake, nonverbal status, and has been bedbound for the last two weeks. According to her family, she lives alone with the help of home nursing care and typically ambulates with a walker. Approximately two weeks ago, she experienced a mechanical fall while attempting to use her walker and was found after an estimated downtime of one to two hours. Initial x-rays performed by home health services showed no concerning injuries, yet the patient has remained nonverbal, though still aware, with minimal food and fluid intake since the incident.    Her family reports no shortness of breath, chest pain, fevers, chills, nausea, vomiting, or diarrhea.     Vitals:  HR  Tachy to 100, otherwise WNL    LABS                           9.7    12.98 )-----------( 541    MCV 91.8                 30.4     136  |  99  |  113  ----------------------------( 91      AG: x   5.3   |  19  |  3.7    Ca: 8.8   Phos: x    Mg: x     Protein, Total: 6.9 / Albumin: 3.2 /  T. Bili 0.4 / D. Bili x  /  AST 22 /  ALT 9  /      VBG pH: 7.29  /  pCO2: 43    /  pO2: 13    / HCO3: 21    /  Lactate: 2.6      Trop 189 >> 174       Imaging studies revealed a 0.7 cm hyperdensity in the left thalamus consistent with a subacute intraparenchymal hemorrhage, possibly of hypertensive origin that was stable on repeat imaging. NSGY recommended no acute intervention and OP f/u. Neuro cleared for resumption of DVT ppx and decreased neurochecks to q8hr.  CT imaging of the chest, abdomen, and pelvis indicated mildly displaced fractures of the ribs, a dilated stool-filled rectosigmoid colon, and a small right pleural effusion with secretions in the right lung lobes suggestive of possible aspiration pneumonia or pneumonitis.   Pt was treated for PNA w/ ceftriaxone and azithromycin, later de-escalating to azithromycin monotherapy  ZEKE initially characterized by a creatinine spike from a baseline of 1.9 to 3.7, normalized to baseline with LR bolus and maintenance fluids   Pt was given enemas due to constipation which resolved.   Given swallowing difficulties ISO dec PO intake, pt was given megace to support her nutritional intake.    PT cleared for DC by Dr. Holder    #Subacute Intraparenchymal Hemorrhage possible HTN etiology  #s/p Fall ? unsure etiology mechanical vs cardiogenic  -CTH: L thalamus hyperdense consistent with IPH  -CXR: Rib fractures  -hold home ASA and DVT ppx for now   -MRI Brain with and without contrast:   There is no evidence of acute infarct, intracranial hemorrhage, mass effect or midline shift. There is generalized cortical volume loss with commensurate ventricular dilation. There is no hydrocephalus. There is confluent periventricular as well as subcortical white matter T2 signal hyperintensity consistent with severe chronic microvascular type changes. There is a chronic right basal ganglia lacunar infarct. There are multiple foci of susceptibility artifact within the bilateral thalami and basal ganglia consistent with chronic hemorrhages.  -Trauma team survey 3/25   -TSH WNL    #Acute Normocytic Anemia  - Hg 9.2 > 8.8 (3/30)  - No active signs of bleeding    #Multiple Rib Fractures s/p fall   - Mildly displaced acute or subacute fractures of the right 2nd-7th anterior ribs and left 5th and 6th anterior ribs.  - No definite evidence of acute traumatic injury within the abdomen or   pelvis.  - APS    # Possible Aspiration PNA/ Pneumonitis  #Small Pleural Effusion  -CXR: Small right pleural effusion and filling defect/secretions within the right lower lobe segmental and subsegmental bronchi, with postobstructive consolidation within the right lower lobe and patchy opacities within the right upper lobe, possibly reflecting pneumonia in the appropriate clinical setting.   -c/w ceftriaxone + azithromycin > deescalated to azithromycin only   -procal 0.48 , BCx, >>NGTD   -aspiration precautions, S&S eval > noted >> pureed diet > encourage drinking ensure    #ZEKE on CKD    - Cr 3.7 from baseline 1.9 likely prerenal due to decreased po intake after fall  - currently at her baseline 1.9 (3/30)  - s/p 250 cc bolus LR  -  s/p LR @ 65 for 24 hours as pt has PO intake    #HTN  - C/w home med Amlodipine 2.5 mg qd, maintain SBP < 140    #Stool [place]  -noted on CT  - pt having BM S/P enema Ms. Moncada is an 89-year-old Faroese-speaking female with a past medical history of hypertension and possible chronic kidney disease, who does not follow up with any physician regularly. She presented to the emergency department with decreased oral intake, nonverbal status, and has been bedbound for the last two weeks. According to her family, she lives alone with the help of home nursing care and typically ambulates with a walker. Approximately two weeks ago, she experienced a mechanical fall while attempting to use her walker and was found after an estimated downtime of one to two hours. Initial x-rays performed by home health services showed no concerning injuries, yet the patient has remained nonverbal, though still aware, with minimal food and fluid intake since the incident.    Her family reports no shortness of breath, chest pain, fevers, chills, nausea, vomiting, or diarrhea.     Vitals:  HR  Tachy to 100, otherwise WNL    LABS                           9.7    12.98 )-----------( 541    MCV 91.8                 30.4     136  |  99  |  113  ----------------------------( 91      AG: x   5.3   |  19  |  3.7    Ca: 8.8   Phos: x    Mg: x     Protein, Total: 6.9 / Albumin: 3.2 /  T. Bili 0.4 / D. Bili x  /  AST 22 /  ALT 9  /      VBG pH: 7.29  /  pCO2: 43    /  pO2: 13    / HCO3: 21    /  Lactate: 2.6      Trop 189 >> 174       Imaging studies revealed a 0.7 cm hyperdensity in the left thalamus consistent with a subacute intraparenchymal hemorrhage, possibly of hypertensive origin that was stable on repeat imaging. NSGY recommended no acute intervention and OP f/u. Neuro cleared for resumption of DVT ppx and decreased neurochecks to q8hr.  CT imaging of the chest, abdomen, and pelvis indicated mildly displaced fractures of the ribs, a dilated stool-filled rectosigmoid colon, and a small right pleural effusion with secretions in the right lung lobes suggestive of possible aspiration pneumonia or pneumonitis.   Pt was treated for PNA w/ ceftriaxone and azithromycin, later de-escalating to azithromycin monotherapy  ZEKE initially characterized by a creatinine spike from a baseline of 1.9 to 3.7, normalized to baseline with LR bolus and maintenance fluids   Pt was given enemas due to constipation which resolved.   Given swallowing difficulties ISO dec PO intake, pt was given megace to support her nutritional intake.    PT cleared for DC by Dr. Holder    #Subacute Intraparenchymal Hemorrhage possible HTN etiology  #s/p Fall ? unsure etiology mechanical vs cardiogenic  -CTH: L thalamus hyperdense consistent with IPH  -CXR: Rib fractures  -hold home ASA and DVT ppx for now   -MRI Brain with and without contrast:   There is no evidence of acute infarct, intracranial hemorrhage, mass effect or midline shift. There is generalized cortical volume loss with commensurate ventricular dilation. There is no hydrocephalus. There is confluent periventricular as well as subcortical white matter T2 signal hyperintensity consistent with severe chronic microvascular type changes. There is a chronic right basal ganglia lacunar infarct. There are multiple foci of susceptibility artifact within the bilateral thalami and basal ganglia consistent with chronic hemorrhages.  -Trauma team survey 3/25   -TSH WNL    #Acute Normocytic Anemia  - Hg 9.2 > 8.8 (3/30)  - No active signs of bleeding    #Multiple Rib Fractures s/p fall   - Mildly displaced acute or subacute fractures of the right 2nd-7th anterior ribs and left 5th and 6th anterior ribs.  - No definite evidence of acute traumatic injury within the abdomen or   pelvis.    # Possible Aspiration PNA/ Pneumonitis  #Small Pleural Effusion  -CXR: Small right pleural effusion and filling defect/secretions within the right lower lobe segmental and subsegmental bronchi, with postobstructive consolidation within the right lower lobe and patchy opacities within the right upper lobe, possibly reflecting pneumonia in the appropriate clinical setting.   -c/w ceftriaxone + azithromycin > deescalated to azithromycin only   -procal 0.48 , BCx, >>NGTD   -aspiration precautions, S&S eval > noted >> pureed diet > encourage drinking ensure    #ZEKE on CKD    - Cr 3.7 from baseline 1.9 likely prerenal due to decreased po intake after fall  - currently at her baseline 1.9 (3/30)  - s/p 250 cc bolus LR  -  s/p LR @ 65 for 24 hours as pt has PO intake    #HTN  - C/w home med Amlodipine 2.5 mg qd, maintain SBP < 140    #Stool [place]  -noted on CT  - pt having BM S/P enema

## 2025-04-01 NOTE — CONSULT NOTE ADULT - CONVERSATION DETAILS
Discussed with patient's son Joseph. [facility] patient lives at home, he lives five minutes away. Patient was moderately able to do ADLs, but declined after fall about three weeks ago. [facility] patient was able to communicate well before the fall, subsequently became nonverbal, but is now improving. Goals are to return back home. Discussed ACP, explained CPR in detail, and Joseph is clear for now that he would want full code.

## 2025-04-02 LAB
ANION GAP SERPL CALC-SCNC: 10 MMOL/L — SIGNIFICANT CHANGE UP (ref 7–14)
BUN SERPL-MCNC: 24 MG/DL — HIGH (ref 10–20)
CALCIUM SERPL-MCNC: 8.4 MG/DL — SIGNIFICANT CHANGE UP (ref 8.4–10.5)
CHLORIDE SERPL-SCNC: 100 MMOL/L — SIGNIFICANT CHANGE UP (ref 98–110)
CO2 SERPL-SCNC: 22 MMOL/L — SIGNIFICANT CHANGE UP (ref 17–32)
CREAT SERPL-MCNC: 1.5 MG/DL — SIGNIFICANT CHANGE UP (ref 0.7–1.5)
EGFR: 33 ML/MIN/1.73M2 — LOW
EGFR: 33 ML/MIN/1.73M2 — LOW
GLUCOSE SERPL-MCNC: 66 MG/DL — LOW (ref 70–99)
HCT VFR BLD CALC: 25.2 % — LOW (ref 37–47)
HGB BLD-MCNC: 8 G/DL — LOW (ref 12–16)
MCHC RBC-ENTMCNC: 29.2 PG — SIGNIFICANT CHANGE UP (ref 27–31)
MCHC RBC-ENTMCNC: 31.7 G/DL — LOW (ref 32–37)
MCV RBC AUTO: 92 FL — SIGNIFICANT CHANGE UP (ref 81–99)
NRBC BLD AUTO-RTO: 0 /100 WBCS — SIGNIFICANT CHANGE UP (ref 0–0)
PLATELET # BLD AUTO: 346 K/UL — SIGNIFICANT CHANGE UP (ref 130–400)
PMV BLD: 9.5 FL — SIGNIFICANT CHANGE UP (ref 7.4–10.4)
POTASSIUM SERPL-MCNC: 3.8 MMOL/L — SIGNIFICANT CHANGE UP (ref 3.5–5)
POTASSIUM SERPL-SCNC: 3.8 MMOL/L — SIGNIFICANT CHANGE UP (ref 3.5–5)
RBC # BLD: 2.74 M/UL — LOW (ref 4.2–5.4)
RBC # FLD: 15.9 % — HIGH (ref 11.5–14.5)
SODIUM SERPL-SCNC: 132 MMOL/L — LOW (ref 135–146)
WBC # BLD: 8.33 K/UL — SIGNIFICANT CHANGE UP (ref 4.8–10.8)
WBC # FLD AUTO: 8.33 K/UL — SIGNIFICANT CHANGE UP (ref 4.8–10.8)

## 2025-04-02 PROCEDURE — 99232 SBSQ HOSP IP/OBS MODERATE 35: CPT

## 2025-04-02 PROCEDURE — 95816 EEG AWAKE AND DROWSY: CPT | Mod: 26

## 2025-04-02 RX ADMIN — Medication 1 APPLICATION(S): at 05:07

## 2025-04-02 RX ADMIN — Medication 400 MILLIGRAM(S): at 05:07

## 2025-04-02 RX ADMIN — Medication 2 TABLET(S): at 23:23

## 2025-04-02 RX ADMIN — Medication 3 MILLIGRAM(S): at 23:26

## 2025-04-02 RX ADMIN — ATORVASTATIN CALCIUM 40 MILLIGRAM(S): 80 TABLET, FILM COATED ORAL at 23:22

## 2025-04-02 RX ADMIN — Medication 40 MILLIGRAM(S): at 12:33

## 2025-04-02 RX ADMIN — AMLODIPINE BESYLATE 2.5 MILLIGRAM(S): 10 TABLET ORAL at 05:06

## 2025-04-02 RX ADMIN — Medication 400 MILLIGRAM(S): at 17:55

## 2025-04-02 NOTE — PROGRESS NOTE ADULT - SUBJECTIVE AND OBJECTIVE BOX
JAROD KORIN  89y  Female      Patient is a 89y old  Female who presents with a chief complaint of IPH.     INTERVAL HPI/OVERNIGHT EVENTS:      ******************************* REVIEW OF SYSTEMS:**********************************************    All other review of systems negative    *********************** VITALS ******************************************    T(F): 97.6 (04-02-25 @ 12:05)  HR: 85 (04-02-25 @ 12:05) (85 - 109)  BP: 117/71 (04-02-25 @ 12:05) (117/71 - 130/74)  RR: 16 (04-02-25 @ 12:05) (16 - 17)  SpO2: 99% (04-02-25 @ 12:05) (96% - 99%)    04-02-25 @ 07:01  -  04-02-25 @ 18:09  --------------------------------------------------------  IN: 0 mL / OUT: 250 mL / NET: -250 mL            04-02-25 @ 07:01  -  04-02-25 @ 18:09  --------------------------------------------------------  IN: 0 mL / OUT: 250 mL / NET: -250 mL        ******************************** PHYSICAL EXAM:**************************************************  GENERAL: NAD    PSYCH: no agitation, baseline mentation  HEENT:     NERVOUS SYSTEM:  Alert & Oriented X 2-3    PULMONARY: OCTAVIO, CTA    CARDIOVASCULAR: S1S2 RRR    GI: Soft, NT, ND; BS present.    EXTREMITIES:  2+ Peripheral Pulses, No clubbing, cyanosis, or edema    LYMPH: No lymphadenopathy noted    SKIN: No rashes or lesions      **************************** LABS *******************************************************                          8.0    8.33  )-----------( 346      ( 02 Apr 2025 06:26 )             25.2     04-02    132[L]  |  100  |  24[H]  ----------------------------<  66[L]  3.8   |  22  |  1.5    Ca    8.4      02 Apr 2025 06:26    TPro  5.4[L]  /  Alb  2.5[L]  /  TBili  0.3  /  DBili  x   /  AST  16  /  ALT  6   /  AlkPhos  125[H]  04-01      Urinalysis Basic - ( 02 Apr 2025 06:26 )    Color: x / Appearance: x / SG: x / pH: x  Gluc: 66 mg/dL / Ketone: x  / Bili: x / Urobili: x   Blood: x / Protein: x / Nitrite: x   Leuk Esterase: x / RBC: x / WBC x   Sq Epi: x / Non Sq Epi: x / Bacteria: x        Lactate Trend        CAPILLARY BLOOD GLUCOSE      POCT Blood Glucose.: 91 mg/dL (01 Apr 2025 21:24)          **************************Active Medications *******************************************  No Known Allergies      acetaminophen     Tablet .. 650 milliGRAM(s) Oral every 6 hours PRN  amLODIPine   Tablet 2.5 milliGRAM(s) Oral daily  atorvastatin 40 milliGRAM(s) Oral at bedtime  bisacodyl Suppository 10 milliGRAM(s) Rectal daily PRN  chlorhexidine 2% Cloths 1 Application(s) Topical <User Schedule>  megestrol Suspension 400 milliGRAM(s) Oral <User Schedule>  melatonin 3 milliGRAM(s) Oral at bedtime PRN  pantoprazole  Injectable 40 milliGRAM(s) IV Push daily  polyethylene glycol 3350 17 Gram(s) Oral every 24 hours  senna 2 Tablet(s) Oral at bedtime      ***************************************************  RADIOLOGY & ADDITIONAL TESTS:    Imaging Personally Reviewed:  [ ] YES  [ ] NO    HEALTH ISSUES - PROBLEM Dx:  Intraparenchymal hemorrhage of brain    Advanced care planning/counseling discussion    Encounter for palliative care

## 2025-04-02 NOTE — PROGRESS NOTE ADULT - ASSESSMENT
Ms. Moncada is an 89-year-old Thai-speaking female PMH HTN, CKD baseline Cr 1.9 admitted for subacute IPH s/p fall 3 weeks ago    #Subacute Intraparenchymal Hemorrhage possible HTN etiology  #s/p Fall ? unsure etiology mechanical vs cardiogenic  -CTH: L thalamus hyperdense consistent with IPH  -CXR: Rib fractures  -hold home ASA and DVT ppx for now   -MRI Brain with and without contrast:   There is no evidence of acute infarct, intracranial hemorrhage, mass effect or midline shift. There is generalized cortical volume loss with commensurate ventricular dilation. There is no hydrocephalus. There is confluent periventricular as well as subcortical white matter T2 signal hyperintensity consistent with severe chronic microvascular type changes. There is a chronic right basal ganglia lacunar infarct. There are multiple foci of susceptibility artifact within the bilateral thalami and basal ganglia consistent with chronic hemorrhages.  -Trauma team survey 3/25   - TSH: 1.33  - EEG    #Acute Normocytic Anemia  - Hg 9.2 > 8.8 (3/30)  - No active signs of bleeding  - Pt not on nasal cannula overnight  -continue to monitor, keep active type and screen    #Multiple Rib Fractures s/p fall   - Mildly displaced acute or subacute fractures of the right 2nd-7th anterior ribs and left 5th and 6th anterior ribs.  - No definite evidence of acute traumatic injury within the abdomen or   pelvis.  - APS    # Possible Aspiration PNA/ Pneumonitis  #Small Pleural Effusion  -CXR: Small right pleural effusion and filling defect/secretions within the right lower lobe segmental and subsegmental bronchi, with postobstructive consolidation within the right lower lobe and patchy opacities within the right upper lobe, possibly reflecting pneumonia in the appropriate clinical setting.   -c/w ceftriaxone + azithromycin > would de escalate in 24-48 hrs   -procal 0.48 , BCx, >>NGTD   -aspiration precautions, S&S eval > noted >> pureed diet > encourage drinking ensure      #ZEKE on CKD    - Cr 3.7 from baseline 1.9 likely prerenal due to decreased po intake after fall  - Cr 1.5 (4/2)  - s/p 250 cc bolus LR  -  resume LR @ 65 for 24 hours as pt has PO intake  -  trend Cr     #HTN  - C/w home med Amlodipine 2.5 mg qd, maintain SBP < 140    #Stool [place]  -noted on CT  - pt having BM S/P enema    DVT prophylaxis: hold for IPH  GI prophylaxis: PPI   Diet: puree and mildly thickened as per speech and swallow. Ensure ordered.  - Marinol  Code status: full code   Activity: as tolerated    PT > Max assist.     Pending: improved PO intake// placement  Plan dw daughter in law.   Dispo: possible SNF

## 2025-04-02 NOTE — PROGRESS NOTE ADULT - ASSESSMENT
Ms. Moncada is an 89-year-old Kyrgyz-speaking female PMH HTN, CKD baseline Cr 1.9 admitted for subacute IPH s/p fall 3 weeks ago    #Subacute Intraparenchymal Hemorrhage possible HTN etiology  #s/p Fall ? unsure etiology mechanical vs cardiogenic  -CTH: L thalamus hyperdense consistent with IPH  -CXR: Rib fractures  -hold home ASA and DVT ppx for now   -MRI Brain with and without contrast:   There is no evidence of acute infarct, intracranial hemorrhage, mass effect or midline shift. There is generalized cortical volume loss with commensurate ventricular dilation. There is no hydrocephalus. There is confluent periventricular as well as subcortical white matter T2 signal hyperintensity consistent with severe chronic microvascular type changes. There is a chronic right basal ganglia lacunar infarct. There are multiple foci of susceptibility artifact within the bilateral thalami and basal ganglia consistent with chronic hemorrhages.  -Trauma team survey 3/25   - TSH: 1.33  - EEG    #Acute Normocytic Anemia  - Hg 9.2 > 8.8 (3/30)  - No active signs of bleeding  - Pt not on nasal cannula overnight  -continue to monitor, keep active type and screen    #Multiple Rib Fractures s/p fall   - Mildly displaced acute or subacute fractures of the right 2nd-7th anterior ribs and left 5th and 6th anterior ribs.  - No definite evidence of acute traumatic injury within the abdomen or   pelvis.  - APS    # Possible Aspiration PNA/ Pneumonitis  #Small Pleural Effusion  -CXR: Small right pleural effusion and filling defect/secretions within the right lower lobe segmental and subsegmental bronchi, with postobstructive consolidation within the right lower lobe and patchy opacities within the right upper lobe, possibly reflecting pneumonia in the appropriate clinical setting.   - s/p ceftriaxone + azithromycin >  -procal 0.48 , BCx, >>NGTD   -aspiration precautions, S&S eval > noted >> pureed diet > encourage drinking ensure    #ZEKE on CKD    - Cr 3.7 from baseline 1.9 likely prerenal due to decreased po intake after fall  - Cr 1.5 (4/2)  - s/p 250 cc bolus LR and maintenance IVF     -  trend Cr     #HTN  - C/w home med Amlodipine 2.5 mg qd, maintain SBP < 140    #Stool [place]  -noted on CT  - pt having BM S/P enema    DVT prophylaxis: hold for IPH  GI prophylaxis: PPI   Diet: puree and mildly thickened as per speech and swallow. Ensure ordered.  - Marinol  Code status: full code   Activity: as tolerated    PT > Max assist.     Pending:: placement  Plan dw daughter in law.   Dispo: possible SNF

## 2025-04-02 NOTE — PROGRESS NOTE ADULT - SUBJECTIVE AND OBJECTIVE BOX
SUBJECTIVE/OVERNIGHT EVENTS  Today is hospital day 8d. This morning patient was seen and examined at bedside, resting comfortably in bed. No acute or major events overnight. Patient responds appropriately with nodding her head to yes/no questions.         MEDICATIONS  STANDING MEDICATIONS  amLODIPine   Tablet 2.5 milliGRAM(s) Oral daily  atorvastatin 40 milliGRAM(s) Oral at bedtime  chlorhexidine 2% Cloths 1 Application(s) Topical <User Schedule>  megestrol Suspension 400 milliGRAM(s) Oral <User Schedule>  pantoprazole  Injectable 40 milliGRAM(s) IV Push daily  polyethylene glycol 3350 17 Gram(s) Oral every 24 hours  senna 2 Tablet(s) Oral at bedtime    PRN MEDICATIONS  acetaminophen     Tablet .. 650 milliGRAM(s) Oral every 6 hours PRN  bisacodyl Suppository 10 milliGRAM(s) Rectal daily PRN  melatonin 3 milliGRAM(s) Oral at bedtime PRN    VITALS  T(F): 97.9 (04-02-25 @ 04:45), Max: 97.9 (04-01-25 @ 20:57)  HR: 94 (04-02-25 @ 04:45) (94 - 109)  BP: 130/74 (04-02-25 @ 04:45) (122/65 - 130/74)  RR: 16 (04-02-25 @ 04:45) (16 - 17)  SpO2: 96% (04-02-25 @ 04:45) (95% - 96%)  POCT Blood Glucose.: 91 mg/dL (04-01-25 @ 21:24)  POCT Blood Glucose.: 93 mg/dL (04-01-25 @ 16:30)  POCT Blood Glucose.: 74 mg/dL (04-01-25 @ 11:20)    PHYSICAL EXAM  GENERAL  ( x ) NAD, lying in bed comfortably     (  ) obtunded     (  ) lethargic     (  ) somnolent    HEAD  ( x ) Atraumatic     (  ) hematoma     (  ) laceration (specify location:       )     NECK  ( x ) Supple     (  ) neck stiffness     (  ) nuchal rigidity     (  )  no JVD     (  ) JVD present ( -- cm)    HEART  Rate -->  (x  ) normal rate    (  ) bradycardic    (  ) tachycardic  Rhythm -->  (  x) regular    (  ) regularly irregular    (  ) irregularly irregular  Murmurs -->  (x  ) normal s1/s2    (  ) systolic murmur    (  ) diastolic murmur    (  ) continuous murmur     (  ) S3 present    (  ) S4 present    LUNGS  (x  )Unlabored respirations     (  ) tachypnea  ( x) B/L air entry     (  ) decreased breath sounds in:  (location     )    (  ) no adventitious sound     (  ) crackles     (  ) wheezing      (  ) rhonchi      (specify location:       )  (  ) chest wall tenderness (specify location:       )    ABDOMEN  (x  ) Soft     (  ) tense   |   (  ) nondistended     (  ) distended   |   (  ) +BS     (  ) hypoactive bowel sounds     (  ) hyperactive bowel sounds  ( x ) nontender     (  ) RUQ tenderness     (  ) RLQ tenderness     (  ) LLQ tenderness     (  ) epigastric tenderness     (  ) diffuse tenderness  (  ) Splenomegaly      (  ) Hepatomegaly      (  ) Jaundice     (  ) ecchymosis     EXTREMITIES  (  ) Normal     (  ) Rash     (  ) ecchymosis     (  ) varicose veins      (  ) pitting edema     (  ) non-pitting edema   (  ) ulceration     (  ) gangrene:     (location:     )    NERVOUS SYSTEM  (  ) A&Ox3     (  ) confused     (  ) lethargic  CN II-XII:     (  ) Intact     (  ) focal deficits  (Specify:     )   Upper extremities:     (  ) strength X/5     (  ) focal deficit (specify:    )  Lower extremities:     (  ) strength  X/5    (  ) focal deficit (specify:    )    SKIN  (  ) No rashes or lesions     (  ) maculopapular rash     (  ) pustules     (  ) vesicles     (  ) ulcer     (  ) ecchymosis     (specify location:     )    (  ) Indwelling Desai Catheter   Date insterted:    Reason (  ) Critical illness     (  ) urinary retention    (  ) Accurate Ins/Outs Monitoring     (  ) CMO patient    (  ) Central Line  Date inserted:  Location: (  ) Right IJ   (  ) Left IJ   (  ) Right Fem   (  ) Left Fem    (  ) SPC  (  ) pigtail  (  ) PEG tube  (  ) colostomy  (  ) jejunostomy  (  ) U-Dall    LABS             8.0    8.33  )-----------( 346      ( 04-02-25 @ 06:26 )             25.2     132  |  100  |  24  -------------------------<  66   04-02-25 @ 06:26  3.8  |  22  |  1.5    Ca      8.4     04-02-25 @ 06:26    TPro  5.4  /  Alb  2.5  /  TBili  0.3  /  DBili  x   /  AST  16  /  ALT  6   /  AlkPhos  125  /  GGT  x     04-01-25 @ 07:10        Urinalysis Basic - ( 02 Apr 2025 06:26 )    Color: x / Appearance: x / SG: x / pH: x  Gluc: 66 mg/dL / Ketone: x  / Bili: x / Urobili: x   Blood: x / Protein: x / Nitrite: x   Leuk Esterase: x / RBC: x / WBC x   Sq Epi: x / Non Sq Epi: x / Bacteria: x          IMAGING

## 2025-04-02 NOTE — PROGRESS NOTE ADULT - NUTRITIONAL ASSESSMENT
This patient has been assessed with a concern for Malnutrition and has been determined to have a diagnosis/diagnoses of Severe protein-calorie malnutrition and Underweight (BMI < 19).    This patient is being managed with:   Diet Pureed-  Supplement Feeding Modality:  Oral  Ensure Pudding Cans or Servings Per Day:  1       Frequency:  Two Times a day  Ensure Plus High Protein Cans or Servings Per Day:  1       Frequency:  Three Times a day  Entered: Mar 31 2025 10:08AM  
This patient has been assessed with a concern for Malnutrition and has been determined to have a diagnosis/diagnoses of Severe protein-calorie malnutrition and Underweight (BMI < 19).    This patient is being managed with:   Diet Pureed-  Supplement Feeding Modality:  Oral  Ensure Pudding Cans or Servings Per Day:  1       Frequency:  Two Times a day  Entered: Mar 27 2025  2:10PM  
This patient has been assessed with a concern for Malnutrition and has been determined to have a diagnosis/diagnoses of Severe protein-calorie malnutrition and Underweight (BMI < 19).    This patient is being managed with:   Diet Pureed-  Supplement Feeding Modality:  Oral  Ensure Pudding Cans or Servings Per Day:  1       Frequency:  Two Times a day  Entered: Mar 27 2025  2:10PM  
This patient has been assessed with a concern for Malnutrition and has been determined to have a diagnosis/diagnoses of Severe protein-calorie malnutrition and Underweight (BMI < 19).    This patient is being managed with:   Diet Pureed-  Supplement Feeding Modality:  Oral  Ensure Pudding Cans or Servings Per Day:  1       Frequency:  Two Times a day  Ensure Plus High Protein Cans or Servings Per Day:  1       Frequency:  Three Times a day  Entered: Mar 31 2025 10:08AM    This patient has been assessed with a concern for Malnutrition and has been determined to have a diagnosis/diagnoses of Severe protein-calorie malnutrition and Underweight (BMI < 19).    This patient is being managed with:   Diet Pureed-  Supplement Feeding Modality:  Oral  Ensure Pudding Cans or Servings Per Day:  1       Frequency:  Two Times a day  Ensure Plus High Protein Cans or Servings Per Day:  1       Frequency:  Three Times a day  Entered: Mar 31 2025 10:08AM  
This patient has been assessed with a concern for Malnutrition and has been determined to have a diagnosis/diagnoses of Severe protein-calorie malnutrition and Underweight (BMI < 19).    This patient is being managed with:   Diet Pureed-  Supplement Feeding Modality:  Oral  Ensure Pudding Cans or Servings Per Day:  1       Frequency:  Two Times a day  Ensure Plus High Protein Cans or Servings Per Day:  1       Frequency:  Three Times a day  Entered: Mar 31 2025 10:08AM  
This patient has been assessed with a concern for Malnutrition and has been determined to have a diagnosis/diagnoses of Severe protein-calorie malnutrition and Underweight (BMI < 19).    This patient is being managed with:   Diet Pureed-  Supplement Feeding Modality:  Oral  Ensure Pudding Cans or Servings Per Day:  1       Frequency:  Two Times a day  Entered: Mar 27 2025  2:10PM  
This patient has been assessed with a concern for Malnutrition and has been determined to have a diagnosis/diagnoses of Severe protein-calorie malnutrition and Underweight (BMI < 19).    This patient is being managed with:   Diet Pureed-  Supplement Feeding Modality:  Oral  Ensure Pudding Cans or Servings Per Day:  1       Frequency:  Two Times a day  Entered: Mar 27 2025  2:10PM

## 2025-04-02 NOTE — EEG REPORT - NS EEG TEXT BOX
[facility]  Inpatient Routine-EEG Report      Patient Name:	KORIN OLIVERA    :	1935  MRN:	[id number]  Study Date/Time:	2025, 5:01:57 PM  Referred by:	-    Brief Clinical History:  KORIN OLIVERA is a 89 year old Female; study performed to investigate for seizures or markers of epilepsy.   Diagnosis Code: R40.4 Transient alteration of awareness    Patient Medication:  -    NORVASC    LIPITOR    MEGACE    SENNA    TYLENOL    MELATONIN      Acquisition Details:  Electroencephalography was acquired using a minimum of 21 channels on an [employer] system v 9.3.1 with electrode placement according to the standard International 10-20 system following ACNS (American Clinical Neurophysiology Society) guidelines.  Anterior temporal T1 and T2 electrodes were utilized whenever possible.   The [employer] automated spike & seizure detections were all reviewed in detail, in addition to the entire raw EEG.    Findings:  Background:  continuous.   Voltage:  Normal (20uV)  Organization:  Appropriate anterior-posterior gradient  Posterior Dominant Rhythm:  8.5 Hz symmetric, well-organized, and well-modulated  Variability:  Yes	Reactivity:  Yes  Sleep:  Absent.  Focal abnormalities:  No persistent asymmetries of voltage or frequency.  Interictal Activity:  None  Focal Slowing:  None  Generalized Slowing:  Mild  Events:  1)	No electrographic seizures or significant clinical events.  Provocations:  1)	Hyperventilation: was not performed.  2)	Photic stimulation: was not performed.  Impression:  Abnormal due to generalized slowing as above    Clinical Correlation:  Findings consistent with mild diffuse electrocerebral dysfunction secondary to nonspecific etiology    Hernando Wagner MD  Attending Neurologist, Division of Epilepsy

## 2025-04-03 VITALS
DIASTOLIC BLOOD PRESSURE: 79 MMHG | TEMPERATURE: 98 F | RESPIRATION RATE: 18 BRPM | HEART RATE: 106 BPM | SYSTOLIC BLOOD PRESSURE: 133 MMHG | OXYGEN SATURATION: 100 %

## 2025-04-03 PROCEDURE — 99239 HOSP IP/OBS DSCHRG MGMT >30: CPT

## 2025-04-03 RX ORDER — ROSUVASTATIN CALCIUM 5 MG/1
1 TABLET, FILM COATED ORAL
Qty: 30 | Refills: 0
Start: 2025-04-03 | End: 2025-05-02

## 2025-04-03 RX ORDER — DRONABINOL 10 MG/1
1 CAPSULE ORAL
Qty: 14 | Refills: 0
Start: 2025-04-03 | End: 2025-04-09

## 2025-04-03 RX ADMIN — AMLODIPINE BESYLATE 2.5 MILLIGRAM(S): 10 TABLET ORAL at 06:26

## 2025-04-03 RX ADMIN — Medication 400 MILLIGRAM(S): at 17:36

## 2025-04-03 RX ADMIN — Medication 400 MILLIGRAM(S): at 06:27

## 2025-04-03 RX ADMIN — POLYETHYLENE GLYCOL 3350 17 GRAM(S): 17 POWDER, FOR SOLUTION ORAL at 12:59

## 2025-04-03 RX ADMIN — Medication 1 APPLICATION(S): at 06:26

## 2025-04-03 RX ADMIN — Medication 40 MILLIGRAM(S): at 12:58

## 2025-04-03 NOTE — PROGRESS NOTE ADULT - ASSESSMENT
Ms. Moncada is an 89-year-old Japanese-speaking female PMH HTN, CKD baseline Cr 1.9 admitted for subacute IPH s/p fall 3 weeks ago    #Subacute Intraparenchymal Hemorrhage possible HTN etiology  #s/p Fall ? unsure etiology mechanical vs cardiogenic  -CTH: L thalamus hyperdense consistent with IPH  -CXR: Rib fractures  -hold home ASA and DVT ppx for now   -MRI Brain with and without contrast:   There is no evidence of acute infarct, intracranial hemorrhage, mass effect or midline shift. There is generalized cortical volume loss with commensurate ventricular dilation. There is no hydrocephalus. There is confluent periventricular as well as subcortical white matter T2 signal hyperintensity consistent with severe chronic microvascular type changes. There is a chronic right basal ganglia lacunar infarct. There are multiple foci of susceptibility artifact within the bilateral thalami and basal ganglia consistent with chronic hemorrhages.  -Trauma team survey 3/25   - TSH: 1.33  - EEG complete    #Acute Normocytic Anemia  - Hg 9.2 > 8.0 (4/3)  - No active signs of bleeding  - Pt not on nasal cannula overnight  -continue to monitor, keep active type and screen    #Multiple Rib Fractures s/p fall   - Mildly displaced acute or subacute fractures of the right 2nd-7th anterior ribs and left 5th and 6th anterior ribs.  - No definite evidence of acute traumatic injury within the abdomen or   pelvis.  - APS    # Possible Aspiration PNA/ Pneumonitis  #Small Pleural Effusion  -CXR: Small right pleural effusion and filling defect/secretions within the right lower lobe segmental and subsegmental bronchi, with postobstructive consolidation within the right lower lobe and patchy opacities within the right upper lobe, possibly reflecting pneumonia in the appropriate clinical setting.   -c/w ceftriaxone + azithromycin > would de escalate in 24-48 hrs   -procal 0.48 , BCx, >>NGTD   -aspiration precautions, S&S eval > noted >> pureed diet > encourage drinking ensure      #ZEKE on CKD    - Cr 3.7 from baseline 1.9 likely prerenal due to decreased po intake after fall  - Cr 1.5 (4/3)  - s/p 250 cc bolus LR  -  resume LR @ 65 for 24 hours as pt has PO intake  -  trend Cr     #HTN  - C/w home med Amlodipine 2.5 mg qd, maintain SBP < 140    #Stool [place]  -noted on CT  - pt having BM S/P enema    DVT prophylaxis: hold for IPH  GI prophylaxis: PPI   Diet: puree and mildly thickened as per speech and swallow. Ensure ordered.  - Marinol  Code status: full code   Activity: as tolerated    PT > Max assist.     Pending: improved PO intake// placement  Plan dw daughter in law.   Dispo: waiting on SNF placement

## 2025-04-03 NOTE — PROGRESS NOTE ADULT - SUBJECTIVE AND OBJECTIVE BOX
SUBJECTIVE/OVERNIGHT EVENTS  Today is hospital day 9d. This morning patient was seen and examined at bedside, resting comfortably in bed. No acute or major events overnight.        MEDICATIONS  STANDING MEDICATIONS  amLODIPine   Tablet 2.5 milliGRAM(s) Oral daily  atorvastatin 40 milliGRAM(s) Oral at bedtime  chlorhexidine 2% Cloths 1 Application(s) Topical <User Schedule>  megestrol Suspension 400 milliGRAM(s) Oral <User Schedule>  pantoprazole  Injectable 40 milliGRAM(s) IV Push daily  polyethylene glycol 3350 17 Gram(s) Oral every 24 hours  senna 2 Tablet(s) Oral at bedtime    PRN MEDICATIONS  acetaminophen     Tablet .. 650 milliGRAM(s) Oral every 6 hours PRN  bisacodyl Suppository 10 milliGRAM(s) Rectal daily PRN  melatonin 3 milliGRAM(s) Oral at bedtime PRN    VITALS  T(F): 97.8 (04-03-25 @ 04:59), Max: 98.1 (04-02-25 @ 21:00)  HR: 106 (04-03-25 @ 04:59) (85 - 106)  BP: 133/79 (04-03-25 @ 04:59) (106/69 - 133/79)  RR: 18 (04-03-25 @ 04:59) (16 - 18)  SpO2: 100% (04-03-25 @ 04:59) (95% - 100%)    PHYSICAL EXAM  GENERAL  (x ) NAD, lying in bed comfortably     (  ) obtunded     (  ) lethargic     (  ) somnolent    HEAD  ( x ) Atraumatic     (  ) hematoma     (  ) laceration (specify location:       )     NECK  ( x ) Supple     (  ) neck stiffness     (  ) nuchal rigidity     (  )  no JVD     (  ) JVD present ( -- cm)    HEART  Rate -->  ( x ) normal rate    (  ) bradycardic    (  ) tachycardic  Rhythm -->  (x  ) regular    (  ) regularly irregular    (  ) irregularly irregular  Murmurs -->  ( x ) normal s1/s2    (  ) systolic murmur    (  ) diastolic murmur    (  ) continuous murmur     (  ) S3 present    (  ) S4 present    LUNGS  (x  )Unlabored respirations     (  ) tachypnea  (  ) B/L air entry     (  ) decreased breath sounds in:  (location     )    (  ) no adventitious sound     (  ) crackles     (  ) wheezing      (  ) rhonchi      (specify location:       )  (  ) chest wall tenderness (specify location:       )    ABDOMEN  ( x ) Soft     (  ) tense   |   (  ) nondistended     (  ) distended   |   (  ) +BS     (  ) hypoactive bowel sounds     (  ) hyperactive bowel sounds  (x  ) nontender     (  ) RUQ tenderness     (  ) RLQ tenderness     (  ) LLQ tenderness     (  ) epigastric tenderness     (  ) diffuse tenderness  (  ) Splenomegaly      (  ) Hepatomegaly      (  ) Jaundice     (  ) ecchymosis     EXTREMITIES  (  ) Normal     (  ) Rash     (  ) ecchymosis     (  ) varicose veins      (  ) pitting edema     (  ) non-pitting edema   (  ) ulceration     (  ) gangrene:     (location:     )    NERVOUS SYSTEM  (  ) A&Ox3     (  ) confused     (  ) lethargic  CN II-XII:     (  ) Intact     (  ) focal deficits  (Specify:     )   Upper extremities:     (  ) strength X/5     (  ) focal deficit (specify:    )  Lower extremities:     (  ) strength  X/5    (  ) focal deficit (specify:    )    SKIN  (  ) No rashes or lesions     (  ) maculopapular rash     (  ) pustules     (  ) vesicles     (  ) ulcer     (  ) ecchymosis     (specify location:     )    (  ) Indwelling Desai Catheter   Date insterted:    Reason (  ) Critical illness     (  ) urinary retention    (  ) Accurate Ins/Outs Monitoring     (  ) CMO patient    (  ) Central Line  Date inserted:  Location: (  ) Right IJ   (  ) Left IJ   (  ) Right Fem   (  ) Left Fem    (  ) SPC  (  ) pigtail  (  ) PEG tube  (  ) colostomy  (  ) jejunostomy  (  ) U-Dall    LABS             8.0    8.33  )-----------( 346      ( 04-02-25 @ 06:26 )             25.2     132  |  100  |  24  -------------------------<  66   04-02-25 @ 06:26  3.8  |  22  |  1.5    Ca      8.4     04-02-25 @ 06:26          Urinalysis Basic - ( 02 Apr 2025 06:26 )    Color: x / Appearance: x / SG: x / pH: x  Gluc: 66 mg/dL / Ketone: x  / Bili: x / Urobili: x   Blood: x / Protein: x / Nitrite: x   Leuk Esterase: x / RBC: x / WBC x   Sq Epi: x / Non Sq Epi: x / Bacteria: x          IMAGING

## 2025-04-03 NOTE — PROGRESS NOTE ADULT - PROVIDER SPECIALTY LIST ADULT
Hospitalist
Internal Medicine
Internal Medicine
Hospitalist
Hospitalist
Internal Medicine
Hospitalist

## 2025-04-14 DIAGNOSIS — Z51.5 ENCOUNTER FOR PALLIATIVE CARE: ICD-10-CM

## 2025-04-14 DIAGNOSIS — L89.150 PRESSURE ULCER OF SACRAL REGION, UNSTAGEABLE: ICD-10-CM
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
## 2025-04-14 DIAGNOSIS — N18.9 CHRONIC KIDNEY DISEASE, UNSPECIFIED: ICD-10-CM

## 2025-04-14 DIAGNOSIS — L89.152 PRESSURE ULCER OF SACRAL REGION, STAGE 2: ICD-10-CM

## 2025-04-14 DIAGNOSIS — S22.43XA MULTIPLE FRACTURES OF RIBS, BILATERAL, INITIAL ENCOUNTER FOR CLOSED FRACTURE: ICD-10-CM

## 2025-04-14 DIAGNOSIS — G93.41 METABOLIC ENCEPHALOPATHY: ICD-10-CM

## 2025-04-14 DIAGNOSIS — N17.9 ACUTE KIDNEY FAILURE, UNSPECIFIED: ICD-10-CM

## 2025-04-14 DIAGNOSIS — I12.9 HYPERTENSIVE CHRONIC KIDNEY DISEASE WITH STAGE 1 THROUGH STAGE 4 CHRONIC KIDNEY DISEASE, OR UNSPECIFIED CHRONIC KIDNEY DISEASE: ICD-10-CM

## 2025-04-14 DIAGNOSIS — E43 UNSPECIFIED SEVERE PROTEIN-CALORIE MALNUTRITION: ICD-10-CM

## 2025-04-14 DIAGNOSIS — Z79.82 LONG TERM (CURRENT) USE OF ASPIRIN: ICD-10-CM

## 2025-04-14 DIAGNOSIS — S06.89AA OTHER SPECIFIED INTRACRANIAL INJURY WITH LOSS OF CONSCIOUSNESS STATUS UNKNOWN, INITIAL ENCOUNTER: ICD-10-CM

## 2025-04-14 DIAGNOSIS — D64.9 ANEMIA, UNSPECIFIED: ICD-10-CM

## 2025-04-14 DIAGNOSIS — J69.0 PNEUMONITIS DUE TO INHALATION OF FOOD AND VOMIT: ICD-10-CM

## 2025-04-14 DIAGNOSIS — J90 PLEURAL EFFUSION, NOT ELSEWHERE CLASSIFIED: ICD-10-CM

## 2025-04-14 DIAGNOSIS — Z91.199 PATIENT'S NONCOMPLIANCE WITH OTHER MEDICAL TREATMENT AND REGIMEN DUE TO UNSPECIFIED REASON: ICD-10-CM

## 2025-05-23 PROBLEM — R13.10 DYSPHAGIA, UNSPECIFIED TYPE: Status: ACTIVE | Noted: 2025-05-23

## 2025-05-23 PROBLEM — I10 BENIGN ESSENTIAL HTN: Status: ACTIVE | Noted: 2025-05-23

## 2025-05-23 PROBLEM — K59.09 CHRONIC CONSTIPATION: Status: ACTIVE | Noted: 2025-05-23

## 2025-05-23 PROBLEM — J69.0 ASPIRATION PNEUMONIA: Status: ACTIVE | Noted: 2025-05-23

## 2025-05-23 PROBLEM — N18.32 STAGE 3B CHRONIC KIDNEY DISEASE: Status: ACTIVE | Noted: 2025-05-23

## 2025-05-23 PROBLEM — I61.9 INTRAPARENCHYMAL HEMORRHAGE OF BRAIN: Status: ACTIVE | Noted: 2025-05-23

## 2025-05-23 PROBLEM — D64.9 ANEMIA: Status: ACTIVE | Noted: 2025-05-23

## 2025-05-23 PROBLEM — M15.9 GENERALIZED OSTEOARTHRITIS: Status: ACTIVE | Noted: 2025-05-23

## 2025-05-23 PROBLEM — I63.81 LACUNAR INFARCTION: Status: ACTIVE | Noted: 2025-05-23

## 2025-05-23 RX ORDER — ATORVASTATIN CALCIUM 40 MG/1
40 TABLET, FILM COATED ORAL
Refills: 0 | Status: ACTIVE | COMMUNITY

## 2025-05-23 RX ORDER — ASPIRIN 81 MG/1
81 TABLET, CHEWABLE ORAL
Refills: 0 | Status: ACTIVE | COMMUNITY

## 2025-05-23 RX ORDER — FERROUS SULFATE 325(65) MG
325 TABLET ORAL
Refills: 0 | Status: ACTIVE | COMMUNITY

## 2025-05-23 RX ORDER — DRONABINOL 5 MG/1
5 CAPSULE ORAL
Refills: 0 | Status: ACTIVE | COMMUNITY

## 2025-05-23 RX ORDER — AMLODIPINE BESYLATE 2.5 MG/1
2.5 TABLET ORAL
Refills: 0 | Status: ACTIVE | COMMUNITY

## 2025-05-31 ENCOUNTER — NON-APPOINTMENT (OUTPATIENT)
Age: 89
End: 2025-05-31

## 2025-05-31 ENCOUNTER — APPOINTMENT (OUTPATIENT)
Dept: GERIATRICS | Facility: HOME HEALTH | Age: 89
End: 2025-05-31

## 2025-05-31 DIAGNOSIS — N18.32 CHRONIC KIDNEY DISEASE, STAGE 3B: ICD-10-CM

## 2025-05-31 DIAGNOSIS — I63.81 OTHER CEREBRAL INFARCTION DUE TO OCCLUSION OR STENOSIS OF SMALL ARTERY: ICD-10-CM

## 2025-05-31 DIAGNOSIS — K59.09 OTHER CONSTIPATION: ICD-10-CM

## 2025-05-31 DIAGNOSIS — D64.9 ANEMIA, UNSPECIFIED: ICD-10-CM

## 2025-05-31 DIAGNOSIS — R13.10 DYSPHAGIA, UNSPECIFIED: ICD-10-CM

## 2025-05-31 DIAGNOSIS — I10 ESSENTIAL (PRIMARY) HYPERTENSION: ICD-10-CM

## 2025-05-31 DIAGNOSIS — M15.9 POLYOSTEOARTHRITIS, UNSPECIFIED: ICD-10-CM

## 2025-05-31 DIAGNOSIS — I61.9 NONTRAUMATIC INTRACEREBRAL HEMORRHAGE, UNSPECIFIED: ICD-10-CM

## 2025-05-31 DIAGNOSIS — J69.0 PNEUMONITIS DUE TO INHALATION OF FOOD AND VOMIT: ICD-10-CM

## 2025-06-02 ENCOUNTER — APPOINTMENT (OUTPATIENT)
Dept: GERIATRICS | Facility: HOME HEALTH | Age: 89
End: 2025-06-02

## 2025-06-02 RX ORDER — MIDODRINE HYDROCHLORIDE 5 MG/1
5 TABLET ORAL
Qty: 60 | Refills: 0 | Status: ACTIVE | COMMUNITY
Start: 2025-06-02 | End: 1900-01-01

## 2025-06-02 RX ORDER — ERGOCALCIFEROL 1.25 MG/1
1.25 MG CAPSULE, LIQUID FILLED ORAL
Qty: 5 | Refills: 0 | Status: ACTIVE | COMMUNITY
Start: 2025-06-02 | End: 1900-01-01